# Patient Record
Sex: MALE | Race: WHITE | NOT HISPANIC OR LATINO | Employment: OTHER | URBAN - NONMETROPOLITAN AREA
[De-identification: names, ages, dates, MRNs, and addresses within clinical notes are randomized per-mention and may not be internally consistent; named-entity substitution may affect disease eponyms.]

---

## 2017-04-25 ENCOUNTER — HOSPITAL ENCOUNTER (EMERGENCY)
Facility: HOSPITAL | Age: 82
Discharge: HOME | End: 2017-04-25
Attending: EMERGENCY MEDICINE | Admitting: EMERGENCY MEDICINE

## 2017-04-25 ENCOUNTER — APPOINTMENT (OUTPATIENT)
Dept: GENERAL RADIOLOGY | Facility: HOSPITAL | Age: 82
End: 2017-04-25

## 2017-04-25 VITALS
OXYGEN SATURATION: 97 % | BODY MASS INDEX: 35.28 KG/M2 | HEART RATE: 62 BPM | RESPIRATION RATE: 18 BRPM | DIASTOLIC BLOOD PRESSURE: 79 MMHG | HEIGHT: 71 IN | WEIGHT: 252 LBS | TEMPERATURE: 98 F | SYSTOLIC BLOOD PRESSURE: 170 MMHG

## 2017-04-25 DIAGNOSIS — K92.2 UPPER GI BLEEDING: Primary | ICD-10-CM

## 2017-04-25 DIAGNOSIS — J20.9 ACUTE BRONCHITIS, UNSPECIFIED ORGANISM: ICD-10-CM

## 2017-04-25 LAB
ALBUMIN SERPL-MCNC: 2.5 G/DL (ref 3.4–4.8)
ALBUMIN/GLOB SERPL: 0.7 G/DL (ref 1.5–2.5)
ALP SERPL-CCNC: 81 U/L (ref 40–129)
ALT SERPL W P-5'-P-CCNC: 10 U/L (ref 10–44)
ANION GAP SERPL CALCULATED.3IONS-SCNC: 6.6 MMOL/L (ref 3.6–11.2)
AST SERPL-CCNC: 19 U/L (ref 10–34)
BASOPHILS # BLD AUTO: 0.03 10*3/MM3 (ref 0–0.3)
BASOPHILS NFR BLD AUTO: 0.5 % (ref 0–2)
BILIRUB SERPL-MCNC: 0.7 MG/DL (ref 0.2–1.8)
BNP SERPL-MCNC: 565 PG/ML (ref 0–100)
BUN BLD-MCNC: 38 MG/DL (ref 7–21)
BUN/CREAT SERPL: 40.9 (ref 7–25)
CALCIUM SPEC-SCNC: 8.5 MG/DL (ref 7.7–10)
CHLORIDE SERPL-SCNC: 111 MMOL/L (ref 99–112)
CO2 SERPL-SCNC: 29.4 MMOL/L (ref 24.3–31.9)
CREAT BLD-MCNC: 0.93 MG/DL (ref 0.43–1.29)
DEPRECATED RDW RBC AUTO: 45.8 FL (ref 37–54)
DEVELOPER EXPIRATION DATE: ABNORMAL
DEVELOPER LOT NUMBER: ABNORMAL
EOSINOPHIL # BLD AUTO: 0.03 10*3/MM3 (ref 0–0.7)
EOSINOPHIL NFR BLD AUTO: 0.5 % (ref 0–7)
ERYTHROCYTE [DISTWIDTH] IN BLOOD BY AUTOMATED COUNT: 13.8 % (ref 11.5–14.5)
EXPIRATION DATE: ABNORMAL
FECAL OCCULT BLOOD SCREEN, POC: POSITIVE
GFR SERPL CREATININE-BSD FRML MDRD: 78 ML/MIN/1.73
GLOBULIN UR ELPH-MCNC: 3.7 GM/DL
GLUCOSE BLD-MCNC: 93 MG/DL (ref 70–110)
HCT VFR BLD AUTO: 37.3 % (ref 42–52)
HGB BLD-MCNC: 12.1 G/DL (ref 14–18)
IMM GRANULOCYTES # BLD: 0.01 10*3/MM3 (ref 0–0.03)
IMM GRANULOCYTES NFR BLD: 0.2 % (ref 0–0.5)
INR PPP: 1.93 (ref 0.8–1.1)
LYMPHOCYTES # BLD AUTO: 2.1 10*3/MM3 (ref 1–3)
LYMPHOCYTES NFR BLD AUTO: 34.9 % (ref 16–46)
Lab: ABNORMAL
MCH RBC QN AUTO: 30.7 PG (ref 27–33)
MCHC RBC AUTO-ENTMCNC: 32.4 G/DL (ref 33–37)
MCV RBC AUTO: 94.7 FL (ref 80–94)
MONOCYTES # BLD AUTO: 0.58 10*3/MM3 (ref 0.1–0.9)
MONOCYTES NFR BLD AUTO: 9.7 % (ref 0–12)
NEGATIVE CONTROL: NEGATIVE
NEUTROPHILS # BLD AUTO: 3.26 10*3/MM3 (ref 1.4–6.5)
NEUTROPHILS NFR BLD AUTO: 54.2 % (ref 40–75)
OSMOLALITY SERPL CALC.SUM OF ELEC: 301.2 MOSM/KG (ref 273–305)
PLATELET # BLD AUTO: 148 10*3/MM3 (ref 130–400)
PMV BLD AUTO: 10 FL (ref 6–10)
POSITIVE CONTROL: POSITIVE
POTASSIUM BLD-SCNC: 3.7 MMOL/L (ref 3.5–5.3)
PROT SERPL-MCNC: 6.2 G/DL (ref 6–8)
PROTHROMBIN TIME: 22.3 SECONDS (ref 9.8–11.9)
RBC # BLD AUTO: 3.94 10*6/MM3 (ref 4.7–6.1)
SODIUM BLD-SCNC: 147 MMOL/L (ref 135–153)
TROPONIN I SERPL-MCNC: 0.03 NG/ML
WBC NRBC COR # BLD: 6.01 10*3/MM3 (ref 4.5–12.5)

## 2017-04-25 PROCEDURE — 93010 ELECTROCARDIOGRAM REPORT: CPT | Performed by: INTERNAL MEDICINE

## 2017-04-25 PROCEDURE — 85610 PROTHROMBIN TIME: CPT | Performed by: PHYSICIAN ASSISTANT

## 2017-04-25 PROCEDURE — 71020 XR CHEST 2 VW: CPT | Performed by: RADIOLOGY

## 2017-04-25 PROCEDURE — 96365 THER/PROPH/DIAG IV INF INIT: CPT

## 2017-04-25 PROCEDURE — 80053 COMPREHEN METABOLIC PANEL: CPT | Performed by: PHYSICIAN ASSISTANT

## 2017-04-25 PROCEDURE — 84484 ASSAY OF TROPONIN QUANT: CPT | Performed by: PHYSICIAN ASSISTANT

## 2017-04-25 PROCEDURE — 71020 HC CHEST PA AND LATERAL: CPT

## 2017-04-25 PROCEDURE — 83880 ASSAY OF NATRIURETIC PEPTIDE: CPT | Performed by: PHYSICIAN ASSISTANT

## 2017-04-25 PROCEDURE — 96376 TX/PRO/DX INJ SAME DRUG ADON: CPT

## 2017-04-25 PROCEDURE — 96366 THER/PROPH/DIAG IV INF ADDON: CPT

## 2017-04-25 PROCEDURE — 99283 EMERGENCY DEPT VISIT LOW MDM: CPT

## 2017-04-25 PROCEDURE — 93005 ELECTROCARDIOGRAM TRACING: CPT | Performed by: PHYSICIAN ASSISTANT

## 2017-04-25 PROCEDURE — 85025 COMPLETE CBC W/AUTO DIFF WBC: CPT | Performed by: PHYSICIAN ASSISTANT

## 2017-04-25 RX ORDER — CARVEDILOL 12.5 MG/1
12.5 TABLET ORAL 2 TIMES DAILY WITH MEALS
Status: ON HOLD | COMMUNITY
End: 2018-07-31

## 2017-04-25 RX ORDER — BENAZEPRIL HYDROCHLORIDE 20 MG/1
40 TABLET ORAL DAILY
Status: ON HOLD | COMMUNITY
End: 2018-04-24

## 2017-04-25 RX ORDER — WARFARIN SODIUM 3 MG/1
3 TABLET ORAL
Status: ON HOLD | COMMUNITY
End: 2018-04-24

## 2017-04-25 RX ORDER — OMEPRAZOLE 20 MG/1
20 CAPSULE, DELAYED RELEASE ORAL DAILY
Qty: 30 CAPSULE | Refills: 0 | Status: ON HOLD | OUTPATIENT
Start: 2017-04-25 | End: 2018-04-24

## 2017-04-25 RX ORDER — PANTOPRAZOLE SODIUM 40 MG/10ML
80 INJECTION, POWDER, LYOPHILIZED, FOR SOLUTION INTRAVENOUS ONCE
Status: COMPLETED | OUTPATIENT
Start: 2017-04-25 | End: 2017-04-25

## 2017-04-25 RX ORDER — LEVOTHYROXINE SODIUM 137 UG/1
137 TABLET ORAL DAILY
Status: ON HOLD | COMMUNITY
End: 2018-04-24

## 2017-04-25 RX ORDER — AMOXICILLIN AND CLAVULANATE POTASSIUM 875; 125 MG/1; MG/1
1 TABLET, FILM COATED ORAL EVERY 12 HOURS
Qty: 20 TABLET | Refills: 0 | Status: ON HOLD | OUTPATIENT
Start: 2017-04-25 | End: 2018-04-24

## 2017-04-25 RX ORDER — SODIUM CHLORIDE 0.9 % (FLUSH) 0.9 %
10 SYRINGE (ML) INJECTION AS NEEDED
Status: DISCONTINUED | OUTPATIENT
Start: 2017-04-25 | End: 2017-04-25 | Stop reason: HOSPADM

## 2017-04-25 RX ORDER — POTASSIUM CHLORIDE 750 MG/1
10 TABLET, EXTENDED RELEASE ORAL
COMMUNITY

## 2017-04-25 RX ORDER — FUROSEMIDE 40 MG/1
40 TABLET ORAL DAILY
COMMUNITY

## 2017-04-25 RX ORDER — BUDESONIDE AND FORMOTEROL FUMARATE DIHYDRATE 160; 4.5 UG/1; UG/1
2 AEROSOL RESPIRATORY (INHALATION)
COMMUNITY

## 2017-04-25 RX ORDER — MECLIZINE HCL 12.5 MG/1
25 TABLET ORAL 2 TIMES DAILY
Status: ON HOLD | COMMUNITY
End: 2018-04-24

## 2017-04-25 RX ORDER — PROBENECID 500 MG/1
500 TABLET, FILM COATED ORAL DAILY
COMMUNITY

## 2017-04-25 RX ADMIN — PANTOPRAZOLE SODIUM 8 MG/HR: 40 INJECTION, POWDER, FOR SOLUTION INTRAVENOUS at 13:38

## 2017-04-25 RX ADMIN — PANTOPRAZOLE SODIUM 80 MG: 40 INJECTION, POWDER, FOR SOLUTION INTRAVENOUS at 13:32

## 2017-04-25 NOTE — DISCHARGE INSTRUCTIONS

## 2017-04-25 NOTE — ED PROVIDER NOTES
Subjective   Patient is a 82 y.o. male presenting with GI bleeding.   GI Bleeding   Location:  Lower  Quality:  Black stool  Severity:  Moderate  Onset quality:  Gradual  Duration:  3 days  Timing:  Intermittent  Progression:  Unchanged  Chronicity:  New  Context:  Patient is on Coumadin.  He denies any abdominal pain.  He's not been vomiting up blood.  Associated symptoms: no abdominal pain, no chest pain, no cough, no diarrhea, no fever and no vomiting        Review of Systems   Constitutional: Negative.  Negative for fever.   HENT: Negative.    Respiratory: Negative.  Negative for cough.    Cardiovascular: Negative.  Negative for chest pain.   Gastrointestinal: Negative.  Negative for abdominal pain, diarrhea and vomiting.   Endocrine: Negative.    Genitourinary: Negative.  Negative for dysuria.   Skin: Negative.    Neurological: Negative.    Psychiatric/Behavioral: Negative.    All other systems reviewed and are negative.      Past Medical History:   Diagnosis Date   • A-fib    • Disease of thyroid gland        No Known Allergies    Past Surgical History:   Procedure Laterality Date   • CARDIAC DEFIBRILLATOR PLACEMENT     • CATARACT EXTRACTION     • PACEMAKER REPLACEMENT     • PROSTATE SURGERY         History reviewed. No pertinent family history.    Social History     Social History   • Marital status: Single     Spouse name: N/A   • Number of children: N/A   • Years of education: N/A     Social History Main Topics   • Smoking status: Never Smoker   • Smokeless tobacco: None   • Alcohol use No   • Drug use: No   • Sexual activity: No     Other Topics Concern   • None     Social History Narrative   • None           Objective   Physical Exam   Constitutional: He is oriented to person, place, and time. He appears well-developed and well-nourished. No distress.   HENT:   Head: Normocephalic and atraumatic.   Right Ear: External ear normal.   Left Ear: External ear normal.   Nose: Nose normal.   Eyes: Conjunctivae  and EOM are normal. Pupils are equal, round, and reactive to light.   Neck: Normal range of motion. Neck supple. No JVD present. No tracheal deviation present.   Cardiovascular: Normal rate, regular rhythm and normal heart sounds.    No murmur heard.  Pulmonary/Chest: Effort normal and breath sounds normal. No respiratory distress. He has no wheezes.   Abdominal: Soft. Bowel sounds are normal. There is no tenderness.   Musculoskeletal: Normal range of motion. He exhibits no edema or deformity.   Neurological: He is alert and oriented to person, place, and time. No cranial nerve deficit.   Skin: Skin is warm and dry. No rash noted. He is not diaphoretic. No erythema. No pallor.   Psychiatric: He has a normal mood and affect. His behavior is normal. Thought content normal.   Nursing note and vitals reviewed.      Procedures         ED Course  ED Course   Comment By Time   Patient is aware of the need for follow-up.  Discussed with Dr. Brownlee.  Patient is hemodynamically stable with normal hemoglobin.  He states that he will follow-up with his primary care in the next 2 days.  We will give him the number for Dr. Gimenez to follow-up with for colonoscopy.  He states his been approximately 10 years since he had its last colonoscopy.  He is aware of signs and symptoms of worsening including bright red blood per rectum abdominal pain or fever.  He is also to follow-up immediately with any vomiting up of blood. TRACY Jc 04/25 1422                  [facility]  Number of Diagnoses or Management Options  Acute bronchitis, unspecified organism: new and requires workup  Upper GI bleeding: new and requires workup     Amount and/or Complexity of Data Reviewed  Clinical lab tests: ordered and reviewed  Tests in the radiology section of CPT®: ordered and reviewed  Tests in the medicine section of CPT®: ordered and reviewed  Discuss the patient with other providers: yes    Risk of Complications, Morbidity, and/or  Mortality  Presenting problems: moderate        Final diagnoses:   Upper GI bleeding   Acute bronchitis, unspecified organism            TRACY Jc  04/25/17 1642       TRACY Jc  05/15/17 144

## 2018-01-03 ENCOUNTER — HOSPITAL ENCOUNTER (EMERGENCY)
Facility: HOSPITAL | Age: 83
Discharge: HOME | End: 2018-01-03
Attending: EMERGENCY MEDICINE | Admitting: EMERGENCY MEDICINE

## 2018-01-03 ENCOUNTER — APPOINTMENT (OUTPATIENT)
Dept: GENERAL RADIOLOGY | Facility: HOSPITAL | Age: 83
End: 2018-01-03

## 2018-01-03 VITALS
RESPIRATION RATE: 20 BRPM | DIASTOLIC BLOOD PRESSURE: 96 MMHG | HEIGHT: 70 IN | HEART RATE: 73 BPM | SYSTOLIC BLOOD PRESSURE: 171 MMHG | OXYGEN SATURATION: 98 % | TEMPERATURE: 97.7 F | BODY MASS INDEX: 34.65 KG/M2 | WEIGHT: 242 LBS

## 2018-01-03 DIAGNOSIS — J18.9 PNEUMONIA OF BOTH LUNGS DUE TO INFECTIOUS ORGANISM, UNSPECIFIED PART OF LUNG: Primary | ICD-10-CM

## 2018-01-03 LAB
A-A DO2: 21.1 MMHG (ref 0–300)
ALBUMIN SERPL-MCNC: 3.3 G/DL (ref 3.4–4.8)
ALBUMIN/GLOB SERPL: 0.8 G/DL (ref 1.5–2.5)
ALP SERPL-CCNC: 86 U/L (ref 40–129)
ALT SERPL W P-5'-P-CCNC: 17 U/L (ref 10–44)
ANION GAP SERPL CALCULATED.3IONS-SCNC: 9 MMOL/L (ref 3.6–11.2)
APTT PPP: 29.8 SECONDS (ref 23.8–36.1)
ARTERIAL PATENCY WRIST A: ABNORMAL
AST SERPL-CCNC: 27 U/L (ref 10–34)
ATMOSPHERIC PRESS: 729 MMHG
BASE EXCESS BLDA CALC-SCNC: 4.9 MMOL/L
BASOPHILS # BLD AUTO: 0.01 10*3/MM3 (ref 0–0.3)
BASOPHILS NFR BLD AUTO: 0.2 % (ref 0–2)
BDY SITE: ABNORMAL
BILIRUB SERPL-MCNC: 0.7 MG/DL (ref 0.2–1.8)
BNP SERPL-MCNC: 609 PG/ML (ref 0–100)
BODY TEMPERATURE: 98.6 C
BUN BLD-MCNC: 12 MG/DL (ref 7–21)
BUN/CREAT SERPL: 10.3 (ref 7–25)
CALCIUM SPEC-SCNC: 9 MG/DL (ref 7.7–10)
CHLORIDE SERPL-SCNC: 104 MMOL/L (ref 99–112)
CO2 SERPL-SCNC: 31 MMOL/L (ref 24.3–31.9)
COHGB MFR BLD: 1.4 % (ref 0–5)
CREAT BLD-MCNC: 1.17 MG/DL (ref 0.43–1.29)
D-LACTATE SERPL-SCNC: 1.5 MMOL/L (ref 0.5–2)
D-LACTATE SERPL-SCNC: 2.2 MMOL/L (ref 0.5–2)
DEPRECATED RDW RBC AUTO: 49.1 FL (ref 37–54)
EOSINOPHIL # BLD AUTO: 0.04 10*3/MM3 (ref 0–0.7)
EOSINOPHIL NFR BLD AUTO: 0.8 % (ref 0–7)
ERYTHROCYTE [DISTWIDTH] IN BLOOD BY AUTOMATED COUNT: 15.3 % (ref 11.5–14.5)
FLUAV AG NPH QL: NEGATIVE
FLUBV AG NPH QL IA: NEGATIVE
GFR SERPL CREATININE-BSD FRML MDRD: 60 ML/MIN/1.73
GLOBULIN UR ELPH-MCNC: 4.3 GM/DL
GLUCOSE BLD-MCNC: 181 MG/DL (ref 70–110)
HCO3 BLDA-SCNC: 29 MMOL/L (ref 22–26)
HCT VFR BLD AUTO: 36 % (ref 42–52)
HCT VFR BLD CALC: 34 % (ref 42–52)
HGB BLD-MCNC: 10.9 G/DL (ref 14–18)
HGB BLDA-MCNC: 11.6 G/DL (ref 12–16)
HOLD SPECIMEN: NORMAL
HOROWITZ INDEX BLD+IHG-RTO: 21 %
IMM GRANULOCYTES # BLD: 0 10*3/MM3 (ref 0–0.03)
IMM GRANULOCYTES NFR BLD: 0 % (ref 0–0.5)
INR PPP: 1.28 (ref 0.9–1.1)
LYMPHOCYTES # BLD AUTO: 1.24 10*3/MM3 (ref 1–3)
LYMPHOCYTES NFR BLD AUTO: 25.3 % (ref 16–46)
MCH RBC QN AUTO: 26.9 PG (ref 27–33)
MCHC RBC AUTO-ENTMCNC: 30.3 G/DL (ref 33–37)
MCV RBC AUTO: 88.9 FL (ref 80–94)
METHGB BLD QL: 0.3 % (ref 0–3)
MODALITY: ABNORMAL
MONOCYTES # BLD AUTO: 0.37 10*3/MM3 (ref 0.1–0.9)
MONOCYTES NFR BLD AUTO: 7.6 % (ref 0–12)
NEUTROPHILS # BLD AUTO: 3.24 10*3/MM3 (ref 1.4–6.5)
NEUTROPHILS NFR BLD AUTO: 66.1 % (ref 40–75)
OSMOLALITY SERPL CALC.SUM OF ELEC: 291.2 MOSM/KG (ref 273–305)
OXYHGB MFR BLDV: 93.1 % (ref 85–100)
PCO2 BLDA: 40.9 MM HG (ref 35–45)
PH BLDA: 7.47 PH UNITS (ref 7.35–7.45)
PLATELET # BLD AUTO: 138 10*3/MM3 (ref 130–400)
PMV BLD AUTO: 10.9 FL (ref 6–10)
PO2 BLDA: 73.1 MM HG (ref 80–100)
POTASSIUM BLD-SCNC: 3.8 MMOL/L (ref 3.5–5.3)
PROT SERPL-MCNC: 7.6 G/DL (ref 6–8)
PROTHROMBIN TIME: 16.1 SECONDS (ref 11–15.4)
RBC # BLD AUTO: 4.05 10*6/MM3 (ref 4.7–6.1)
SAO2 % BLDCOA: 94.7 % (ref 90–100)
SODIUM BLD-SCNC: 144 MMOL/L (ref 135–153)
TROPONIN I SERPL-MCNC: 0.05 NG/ML
TROPONIN I SERPL-MCNC: 0.06 NG/ML
WBC NRBC COR # BLD: 4.9 10*3/MM3 (ref 4.5–12.5)

## 2018-01-03 PROCEDURE — 25010000002 HYDRALAZINE PER 20 MG: Performed by: PHYSICIAN ASSISTANT

## 2018-01-03 PROCEDURE — 71046 X-RAY EXAM CHEST 2 VIEWS: CPT

## 2018-01-03 PROCEDURE — 87804 INFLUENZA ASSAY W/OPTIC: CPT | Performed by: PHYSICIAN ASSISTANT

## 2018-01-03 PROCEDURE — 96375 TX/PRO/DX INJ NEW DRUG ADDON: CPT

## 2018-01-03 PROCEDURE — 71046 X-RAY EXAM CHEST 2 VIEWS: CPT | Performed by: RADIOLOGY

## 2018-01-03 PROCEDURE — 83605 ASSAY OF LACTIC ACID: CPT | Performed by: PHYSICIAN ASSISTANT

## 2018-01-03 PROCEDURE — 25010000002 CEFTRIAXONE PER 250 MG: Performed by: PHYSICIAN ASSISTANT

## 2018-01-03 PROCEDURE — 96365 THER/PROPH/DIAG IV INF INIT: CPT

## 2018-01-03 PROCEDURE — 85730 THROMBOPLASTIN TIME PARTIAL: CPT | Performed by: PHYSICIAN ASSISTANT

## 2018-01-03 PROCEDURE — 83050 HGB METHEMOGLOBIN QUAN: CPT | Performed by: PHYSICIAN ASSISTANT

## 2018-01-03 PROCEDURE — 87040 BLOOD CULTURE FOR BACTERIA: CPT | Performed by: PHYSICIAN ASSISTANT

## 2018-01-03 PROCEDURE — 25010000002 METHYLPREDNISOLONE PER 125 MG: Performed by: PHYSICIAN ASSISTANT

## 2018-01-03 PROCEDURE — 80053 COMPREHEN METABOLIC PANEL: CPT | Performed by: PHYSICIAN ASSISTANT

## 2018-01-03 PROCEDURE — 94799 UNLISTED PULMONARY SVC/PX: CPT

## 2018-01-03 PROCEDURE — 93005 ELECTROCARDIOGRAM TRACING: CPT | Performed by: EMERGENCY MEDICINE

## 2018-01-03 PROCEDURE — 36415 COLL VENOUS BLD VENIPUNCTURE: CPT

## 2018-01-03 PROCEDURE — 36600 WITHDRAWAL OF ARTERIAL BLOOD: CPT | Performed by: PHYSICIAN ASSISTANT

## 2018-01-03 PROCEDURE — 82805 BLOOD GASES W/O2 SATURATION: CPT | Performed by: PHYSICIAN ASSISTANT

## 2018-01-03 PROCEDURE — 85025 COMPLETE CBC W/AUTO DIFF WBC: CPT | Performed by: PHYSICIAN ASSISTANT

## 2018-01-03 PROCEDURE — 99284 EMERGENCY DEPT VISIT MOD MDM: CPT

## 2018-01-03 PROCEDURE — 83880 ASSAY OF NATRIURETIC PEPTIDE: CPT | Performed by: PHYSICIAN ASSISTANT

## 2018-01-03 PROCEDURE — 84484 ASSAY OF TROPONIN QUANT: CPT | Performed by: PHYSICIAN ASSISTANT

## 2018-01-03 PROCEDURE — 94640 AIRWAY INHALATION TREATMENT: CPT

## 2018-01-03 PROCEDURE — 85610 PROTHROMBIN TIME: CPT | Performed by: PHYSICIAN ASSISTANT

## 2018-01-03 PROCEDURE — 82375 ASSAY CARBOXYHB QUANT: CPT | Performed by: PHYSICIAN ASSISTANT

## 2018-01-03 RX ORDER — IPRATROPIUM BROMIDE AND ALBUTEROL SULFATE 2.5; .5 MG/3ML; MG/3ML
3 SOLUTION RESPIRATORY (INHALATION) ONCE
Status: COMPLETED | OUTPATIENT
Start: 2018-01-03 | End: 2018-01-03

## 2018-01-03 RX ORDER — HYDRALAZINE HYDROCHLORIDE 20 MG/ML
10 INJECTION INTRAMUSCULAR; INTRAVENOUS ONCE
Status: COMPLETED | OUTPATIENT
Start: 2018-01-03 | End: 2018-01-03

## 2018-01-03 RX ORDER — SODIUM CHLORIDE 0.9 % (FLUSH) 0.9 %
10 SYRINGE (ML) INJECTION AS NEEDED
Status: DISCONTINUED | OUTPATIENT
Start: 2018-01-03 | End: 2018-01-03 | Stop reason: HOSPADM

## 2018-01-03 RX ORDER — CEFDINIR 300 MG/1
300 CAPSULE ORAL
Qty: 20 CAPSULE | Refills: 0 | Status: SHIPPED | OUTPATIENT
Start: 2018-01-03 | End: 2018-01-13

## 2018-01-03 RX ORDER — METHYLPREDNISOLONE SODIUM SUCCINATE 125 MG/2ML
125 INJECTION, POWDER, LYOPHILIZED, FOR SOLUTION INTRAMUSCULAR; INTRAVENOUS ONCE
Status: COMPLETED | OUTPATIENT
Start: 2018-01-03 | End: 2018-01-03

## 2018-01-03 RX ORDER — PREDNISONE 20 MG/1
20 TABLET ORAL 3 TIMES DAILY
Qty: 15 TABLET | Refills: 0 | Status: SHIPPED | OUTPATIENT
Start: 2018-01-03 | End: 2018-01-08

## 2018-01-03 RX ORDER — DOXYCYCLINE 100 MG/1
100 CAPSULE ORAL EVERY 12 HOURS SCHEDULED
Qty: 20 CAPSULE | Refills: 0 | Status: SHIPPED | OUTPATIENT
Start: 2018-01-03 | End: 2018-01-13

## 2018-01-03 RX ORDER — ALBUTEROL SULFATE 90 UG/1
2 AEROSOL, METERED RESPIRATORY (INHALATION) EVERY 4 HOURS PRN
Qty: 1 INHALER | Refills: 0 | Status: ON HOLD | OUTPATIENT
Start: 2018-01-03 | End: 2018-04-24

## 2018-01-03 RX ADMIN — IPRATROPIUM BROMIDE AND ALBUTEROL SULFATE 3 ML: .5; 3 SOLUTION RESPIRATORY (INHALATION) at 13:27

## 2018-01-03 RX ADMIN — METHYLPREDNISOLONE SODIUM SUCCINATE 125 MG: 125 INJECTION, POWDER, FOR SOLUTION INTRAMUSCULAR; INTRAVENOUS at 11:15

## 2018-01-03 RX ADMIN — IPRATROPIUM BROMIDE AND ALBUTEROL SULFATE 3 ML: .5; 3 SOLUTION RESPIRATORY (INHALATION) at 12:00

## 2018-01-03 RX ADMIN — IPRATROPIUM BROMIDE AND ALBUTEROL SULFATE 3 ML: .5; 3 SOLUTION RESPIRATORY (INHALATION) at 11:21

## 2018-01-03 RX ADMIN — HYDRALAZINE HYDROCHLORIDE 10 MG: 20 INJECTION INTRAMUSCULAR; INTRAVENOUS at 14:41

## 2018-01-03 RX ADMIN — CEFTRIAXONE 1 G: 1 INJECTION, SOLUTION INTRAVENOUS at 14:27

## 2018-01-03 NOTE — ED PROVIDER NOTES
Subjective   HPI Comments: Pt was seen at urgent care yesterday and they told pt he most likely had pneumonia, referred him on to ED for further evaluation. States that he did not want to come in yesterday, so came this shannan instead. He states he has had cough with sputum production that started x2 weeks ago, but was mild in the beginning, and has become worse in the last few days. He denies any fever. He denies CP.     Patient is a 83 y.o. male presenting with shortness of breath.   History provided by:  Patient   used: No    Shortness of Breath   Severity:  Moderate  Onset quality:  Sudden  Duration:  2 weeks  Timing:  Constant  Progression:  Unchanged  Chronicity:  New  Context: URI and weather changes    Relieved by:  Rest  Worsened by:  Exertion, coughing and activity  Ineffective treatments:  None tried  Associated symptoms: cough, sputum production and wheezing    Associated symptoms: no chest pain, no ear pain, no fever, no headaches, no rash, no sore throat and no vomiting    Risk factors: no hx of PE/DVT and no tobacco use        Review of Systems   Constitutional: Negative for activity change and fever.   HENT: Negative for congestion, ear pain and sore throat.    Eyes: Negative for pain.   Respiratory: Positive for cough, sputum production, shortness of breath and wheezing.    Cardiovascular: Negative for chest pain.   Gastrointestinal: Negative for abdominal distention, diarrhea, nausea and vomiting.   Genitourinary: Negative for difficulty urinating and dysuria.   Musculoskeletal: Negative for arthralgias and myalgias.   Skin: Negative for rash and wound.   Neurological: Negative for dizziness and headaches.   Psychiatric/Behavioral: Negative for agitation.   All other systems reviewed and are negative.      Past Medical History:   Diagnosis Date   • A-fib    • Bleeding ulcer    • CHF (congestive heart failure)    • COPD (chronic obstructive pulmonary disease)    • Disease of  thyroid gland    • Hypertension        No Known Allergies    Past Surgical History:   Procedure Laterality Date   • CARDIAC DEFIBRILLATOR PLACEMENT     • CATARACT EXTRACTION     • PACEMAKER REPLACEMENT     • PROSTATE SURGERY         History reviewed. No pertinent family history.    Social History     Social History   • Marital status: Single     Spouse name: N/A   • Number of children: N/A   • Years of education: N/A     Social History Main Topics   • Smoking status: Never Smoker   • Smokeless tobacco: Never Used   • Alcohol use No   • Drug use: No   • Sexual activity: No     Other Topics Concern   • None     Social History Narrative   • None           Objective   Physical Exam   Constitutional: He is oriented to person, place, and time. He appears well-developed and well-nourished.   HENT:   Head: Normocephalic and atraumatic.   Eyes: EOM are normal. Pupils are equal, round, and reactive to light.   Neck: Normal range of motion. Neck supple.   Cardiovascular: Normal rate, regular rhythm and normal heart sounds.    Pulmonary/Chest: Effort normal. He has decreased breath sounds. He has wheezes.   Abdominal: Soft. Bowel sounds are normal.   Musculoskeletal: Normal range of motion.   Neurological: He is alert and oriented to person, place, and time.   Skin: Skin is warm and dry.   Psychiatric: He has a normal mood and affect. His behavior is normal. Judgment and thought content normal.   Nursing note and vitals reviewed.      Procedures         ED Course  ED Course   Value Comment By Time   ECG 12 Lead Ventricular paced rhythm, rate 62. -per TRACY Alexandra 01/03 1109    Pt states he did not take his morning meds, but he did bring his meds with him and would like to take them at this time. TRACY Rosales 01/03 1159   Lactate, Venous: (!!) 2.2 Will hold off on iv fluids at this time as pt's bnp is elevated. TRACY Rosales 01/03 1306                  MDM  Number of Diagnoses or Management  Options  Pneumonia of both lungs due to infectious organism, unspecified part of lung:      Amount and/or Complexity of Data Reviewed  Clinical lab tests: ordered and reviewed  Tests in the radiology section of CPT®: ordered and reviewed  Tests in the medicine section of CPT®: ordered and reviewed  Decide to obtain previous medical records or to obtain history from someone other than the patient: yes  Independent visualization of images, tracings, or specimens: yes    Patient Progress  Patient progress: stable      Final diagnoses:   Pneumonia of both lungs due to infectious organism, unspecified part of lung            TRACY Rosales  01/03/18 9564

## 2018-01-08 LAB
BACTERIA SPEC AEROBE CULT: NORMAL
BACTERIA SPEC AEROBE CULT: NORMAL

## 2018-03-25 ENCOUNTER — APPOINTMENT (OUTPATIENT)
Dept: GENERAL RADIOLOGY | Facility: HOSPITAL | Age: 83
End: 2018-03-25

## 2018-03-25 ENCOUNTER — HOSPITAL ENCOUNTER (EMERGENCY)
Facility: HOSPITAL | Age: 83
Discharge: HOME | End: 2018-03-25
Attending: EMERGENCY MEDICINE | Admitting: NURSE PRACTITIONER

## 2018-03-25 VITALS
BODY MASS INDEX: 33.21 KG/M2 | TEMPERATURE: 98 F | OXYGEN SATURATION: 98 % | DIASTOLIC BLOOD PRESSURE: 78 MMHG | HEART RATE: 71 BPM | WEIGHT: 232 LBS | RESPIRATION RATE: 16 BRPM | SYSTOLIC BLOOD PRESSURE: 160 MMHG | HEIGHT: 70 IN

## 2018-03-25 DIAGNOSIS — J18.9 PNEUMONIA OF RIGHT LOWER LOBE DUE TO INFECTIOUS ORGANISM: Primary | ICD-10-CM

## 2018-03-25 LAB
ALBUMIN SERPL-MCNC: 3.3 G/DL (ref 3.4–4.8)
ALBUMIN/GLOB SERPL: 0.9 G/DL (ref 1.5–2.5)
ALP SERPL-CCNC: 83 U/L (ref 40–129)
ALT SERPL W P-5'-P-CCNC: 13 U/L (ref 10–44)
ANION GAP SERPL CALCULATED.3IONS-SCNC: 5.1 MMOL/L (ref 3.6–11.2)
AST SERPL-CCNC: 19 U/L (ref 10–34)
BASOPHILS # BLD AUTO: 0.02 10*3/MM3 (ref 0–0.3)
BASOPHILS NFR BLD AUTO: 0.5 % (ref 0–2)
BILIRUB SERPL-MCNC: 0.3 MG/DL (ref 0.2–1.8)
BNP SERPL-MCNC: 513 PG/ML (ref 0–100)
BUN BLD-MCNC: 14 MG/DL (ref 7–21)
BUN/CREAT SERPL: 10.4 (ref 7–25)
CALCIUM SPEC-SCNC: 8.9 MG/DL (ref 7.7–10)
CHLORIDE SERPL-SCNC: 106 MMOL/L (ref 99–112)
CO2 SERPL-SCNC: 31.9 MMOL/L (ref 24.3–31.9)
CREAT BLD-MCNC: 1.35 MG/DL (ref 0.43–1.29)
DEPRECATED RDW RBC AUTO: 48.4 FL (ref 37–54)
EOSINOPHIL # BLD AUTO: 0.05 10*3/MM3 (ref 0–0.7)
EOSINOPHIL NFR BLD AUTO: 1.3 % (ref 0–7)
ERYTHROCYTE [DISTWIDTH] IN BLOOD BY AUTOMATED COUNT: 15.1 % (ref 11.5–14.5)
GFR SERPL CREATININE-BSD FRML MDRD: 50 ML/MIN/1.73
GLOBULIN UR ELPH-MCNC: 3.7 GM/DL
GLUCOSE BLD-MCNC: 128 MG/DL (ref 70–110)
HCT VFR BLD AUTO: 33.3 % (ref 42–52)
HGB BLD-MCNC: 10.2 G/DL (ref 14–18)
IMM GRANULOCYTES # BLD: 0.01 10*3/MM3 (ref 0–0.03)
IMM GRANULOCYTES NFR BLD: 0.3 % (ref 0–0.5)
LYMPHOCYTES # BLD AUTO: 1.42 10*3/MM3 (ref 1–3)
LYMPHOCYTES NFR BLD AUTO: 37.8 % (ref 16–46)
MCH RBC QN AUTO: 27.1 PG (ref 27–33)
MCHC RBC AUTO-ENTMCNC: 30.6 G/DL (ref 33–37)
MCV RBC AUTO: 88.6 FL (ref 80–94)
MONOCYTES # BLD AUTO: 0.42 10*3/MM3 (ref 0.1–0.9)
MONOCYTES NFR BLD AUTO: 11.2 % (ref 0–12)
NEUTROPHILS # BLD AUTO: 1.84 10*3/MM3 (ref 1.4–6.5)
NEUTROPHILS NFR BLD AUTO: 48.9 % (ref 40–75)
OSMOLALITY SERPL CALC.SUM OF ELEC: 287.1 MOSM/KG (ref 273–305)
PLATELET # BLD AUTO: 141 10*3/MM3 (ref 130–400)
PMV BLD AUTO: 9.2 FL (ref 6–10)
POTASSIUM BLD-SCNC: 4.6 MMOL/L (ref 3.5–5.3)
PROT SERPL-MCNC: 7 G/DL (ref 6–8)
RBC # BLD AUTO: 3.76 10*6/MM3 (ref 4.7–6.1)
SODIUM BLD-SCNC: 143 MMOL/L (ref 135–153)
WBC NRBC COR # BLD: 3.76 10*3/MM3 (ref 4.5–12.5)

## 2018-03-25 PROCEDURE — 83880 ASSAY OF NATRIURETIC PEPTIDE: CPT | Performed by: NURSE PRACTITIONER

## 2018-03-25 PROCEDURE — 85025 COMPLETE CBC W/AUTO DIFF WBC: CPT | Performed by: NURSE PRACTITIONER

## 2018-03-25 PROCEDURE — 99284 EMERGENCY DEPT VISIT MOD MDM: CPT

## 2018-03-25 PROCEDURE — 71046 X-RAY EXAM CHEST 2 VIEWS: CPT | Performed by: RADIOLOGY

## 2018-03-25 PROCEDURE — 80053 COMPREHEN METABOLIC PANEL: CPT | Performed by: NURSE PRACTITIONER

## 2018-03-25 PROCEDURE — 71046 X-RAY EXAM CHEST 2 VIEWS: CPT

## 2018-03-25 RX ORDER — AMOXICILLIN AND CLAVULANATE POTASSIUM 875; 125 MG/1; MG/1
1 TABLET, FILM COATED ORAL 2 TIMES DAILY
Qty: 20 TABLET | Refills: 0 | Status: SHIPPED | OUTPATIENT
Start: 2018-03-25 | End: 2018-04-04

## 2018-03-25 RX ORDER — AMOXICILLIN AND CLAVULANATE POTASSIUM 875; 125 MG/1; MG/1
1 TABLET, FILM COATED ORAL ONCE
Status: COMPLETED | OUTPATIENT
Start: 2018-03-25 | End: 2018-03-25

## 2018-03-25 RX ORDER — AMOXICILLIN AND CLAVULANATE POTASSIUM 875; 125 MG/1; MG/1
TABLET, FILM COATED ORAL
Status: COMPLETED
Start: 2018-03-25 | End: 2018-03-25

## 2018-03-25 RX ADMIN — AMOXICILLIN AND CLAVULANATE POTASSIUM: 875; 125 TABLET, FILM COATED ORAL at 21:18

## 2018-03-26 NOTE — ED PROVIDER NOTES
Subjective     History provided by:  Patient  Cough   Cough characteristics:  Productive  Sputum characteristics:  Yellow  Severity:  Moderate  Onset quality:  Sudden  Timing:  Constant  Progression:  Worsening  Chronicity:  New  Smoker: no    Relieved by:  None tried  Worsened by:  Nothing  Ineffective treatments:  None tried  Associated symptoms: no chest pain, no fever and no shortness of breath        Review of Systems   Constitutional: Negative.  Negative for fever.   HENT: Negative.    Respiratory: Positive for cough. Negative for shortness of breath.    Cardiovascular: Negative.  Negative for chest pain.   Gastrointestinal: Negative.  Negative for abdominal pain.   Endocrine: Negative.    Genitourinary: Negative.  Negative for dysuria.   Skin: Negative.    Neurological: Negative.    Psychiatric/Behavioral: Negative.    All other systems reviewed and are negative.      Past Medical History:   Diagnosis Date   • A-fib    • Bleeding ulcer    • CHF (congestive heart failure)    • COPD (chronic obstructive pulmonary disease)    • Disease of thyroid gland    • Hypertension        No Known Allergies    Past Surgical History:   Procedure Laterality Date   • CARDIAC DEFIBRILLATOR PLACEMENT     • CATARACT EXTRACTION     • PACEMAKER REPLACEMENT     • PROSTATE SURGERY         History reviewed. No pertinent family history.    Social History     Social History   • Marital status:      Social History Main Topics   • Smoking status: Never Smoker   • Smokeless tobacco: Never Used   • Alcohol use No   • Drug use: No   • Sexual activity: No     Other Topics Concern   • Not on file           Objective   Physical Exam   Constitutional: He is oriented to person, place, and time. He appears well-developed and well-nourished. No distress.   HENT:   Head: Normocephalic and atraumatic.   Right Ear: External ear normal.   Left Ear: External ear normal.   Nose: Nose normal.   Eyes: Conjunctivae and EOM are normal. Pupils are  equal, round, and reactive to light.   Neck: Normal range of motion. Neck supple. No JVD present. No tracheal deviation present.   Cardiovascular: Normal rate, regular rhythm and normal heart sounds.    No murmur heard.  Pulmonary/Chest: Effort normal. No respiratory distress. He has wheezes.   Abdominal: Soft. Bowel sounds are normal. There is no tenderness.   Musculoskeletal: Normal range of motion. He exhibits no edema or deformity.   Neurological: He is alert and oriented to person, place, and time. No cranial nerve deficit.   Skin: Skin is warm and dry. No rash noted. He is not diaphoretic. No erythema. No pallor.   Psychiatric: He has a normal mood and affect. His behavior is normal. Thought content normal.   Nursing note and vitals reviewed.      Procedures         ED Course  ED Course                  MDM  Number of Diagnoses or Management Options  Pneumonia of right lower lobe due to infectious organism: new and requires workup     Amount and/or Complexity of Data Reviewed  Clinical lab tests: reviewed  Tests in the radiology section of CPT®: reviewed  Decide to obtain previous medical records or to obtain history from someone other than the patient: yes    Risk of Complications, Morbidity, and/or Mortality  Presenting problems: low  Diagnostic procedures: low  Management options: low    Patient Progress  Patient progress: stable      Final diagnoses:   Pneumonia of right lower lobe due to infectious organism            Liliana Adamson, APRN  03/25/18 3678

## 2018-04-24 ENCOUNTER — HOSPITAL ENCOUNTER (INPATIENT)
Facility: HOSPITAL | Age: 83
LOS: 14 days | Discharge: HOME HEALTH | End: 2018-05-08
Attending: FAMILY MEDICINE | Admitting: FAMILY MEDICINE

## 2018-04-24 DIAGNOSIS — R53.81 DEBILITY: Primary | ICD-10-CM

## 2018-04-24 PROCEDURE — 94640 AIRWAY INHALATION TREATMENT: CPT

## 2018-04-24 PROCEDURE — 94799 UNLISTED PULMONARY SVC/PX: CPT

## 2018-04-24 RX ORDER — ALBUTEROL SULFATE 2.5 MG/3ML
2.5 SOLUTION RESPIRATORY (INHALATION) EVERY 6 HOURS PRN
Status: DISCONTINUED | OUTPATIENT
Start: 2018-04-24 | End: 2018-05-08 | Stop reason: HOSPADM

## 2018-04-24 RX ORDER — NIFEDIPINE 30 MG/1
30 TABLET, FILM COATED, EXTENDED RELEASE ORAL
Status: DISCONTINUED | OUTPATIENT
Start: 2018-04-25 | End: 2018-05-08 | Stop reason: HOSPADM

## 2018-04-24 RX ORDER — LANOLIN ALCOHOL/MO/W.PET/CERES
1000 CREAM (GRAM) TOPICAL DAILY
COMMUNITY

## 2018-04-24 RX ORDER — OMEGA-3S/DHA/EPA/FISH OIL/D3 300MG-1000
1000 CAPSULE ORAL DAILY
Status: DISCONTINUED | OUTPATIENT
Start: 2018-04-25 | End: 2018-05-08 | Stop reason: HOSPADM

## 2018-04-24 RX ORDER — OXYCODONE HYDROCHLORIDE 5 MG/1
5 TABLET ORAL EVERY 6 HOURS PRN
Status: DISPENSED | OUTPATIENT
Start: 2018-04-24 | End: 2018-05-04

## 2018-04-24 RX ORDER — PANTOPRAZOLE SODIUM 40 MG/1
40 TABLET, DELAYED RELEASE ORAL 2 TIMES DAILY
COMMUNITY

## 2018-04-24 RX ORDER — FLUTICASONE PROPIONATE 50 MCG
2 SPRAY, SUSPENSION (ML) NASAL DAILY
Status: DISCONTINUED | OUTPATIENT
Start: 2018-04-25 | End: 2018-05-08 | Stop reason: HOSPADM

## 2018-04-24 RX ORDER — PROBENECID 500 MG/1
500 TABLET, FILM COATED ORAL DAILY
Status: DISCONTINUED | OUTPATIENT
Start: 2018-04-25 | End: 2018-05-08 | Stop reason: HOSPADM

## 2018-04-24 RX ORDER — LOSARTAN POTASSIUM 50 MG/1
50 TABLET ORAL DAILY
COMMUNITY
End: 2018-05-08 | Stop reason: HOSPADM

## 2018-04-24 RX ORDER — FLUTICASONE PROPIONATE 50 MCG
1 SPRAY, SUSPENSION (ML) NASAL DAILY
COMMUNITY

## 2018-04-24 RX ORDER — ONDANSETRON 4 MG/1
4 TABLET, FILM COATED ORAL EVERY 6 HOURS PRN
Status: DISCONTINUED | OUTPATIENT
Start: 2018-04-24 | End: 2018-05-08 | Stop reason: HOSPADM

## 2018-04-24 RX ORDER — CARVEDILOL 6.25 MG/1
12.5 TABLET ORAL EVERY 12 HOURS SCHEDULED
Status: DISCONTINUED | OUTPATIENT
Start: 2018-04-24 | End: 2018-05-08 | Stop reason: HOSPADM

## 2018-04-24 RX ORDER — POTASSIUM CHLORIDE 750 MG/1
10 TABLET, FILM COATED, EXTENDED RELEASE ORAL DAILY
Status: DISCONTINUED | OUTPATIENT
Start: 2018-04-25 | End: 2018-05-08 | Stop reason: HOSPADM

## 2018-04-24 RX ORDER — PANTOPRAZOLE SODIUM 40 MG/1
40 TABLET, DELAYED RELEASE ORAL
Status: DISCONTINUED | OUTPATIENT
Start: 2018-04-24 | End: 2018-05-08 | Stop reason: HOSPADM

## 2018-04-24 RX ORDER — GUAIFENESIN 600 MG/1
600 TABLET, EXTENDED RELEASE ORAL EVERY 12 HOURS SCHEDULED
Status: DISCONTINUED | OUTPATIENT
Start: 2018-04-24 | End: 2018-05-08 | Stop reason: HOSPADM

## 2018-04-24 RX ORDER — SALSALATE 500 MG
500 TABLET ORAL 2 TIMES DAILY
COMMUNITY
End: 2018-05-08 | Stop reason: HOSPADM

## 2018-04-24 RX ORDER — LEVOTHYROXINE SODIUM 0.15 MG/1
150 TABLET ORAL DAILY
COMMUNITY

## 2018-04-24 RX ORDER — ACETAMINOPHEN 325 MG/1
650 TABLET ORAL EVERY 4 HOURS PRN
Status: DISCONTINUED | OUTPATIENT
Start: 2018-04-24 | End: 2018-05-08 | Stop reason: HOSPADM

## 2018-04-24 RX ORDER — KETOTIFEN FUMARATE 0.35 MG/ML
1 SOLUTION/ DROPS OPHTHALMIC 2 TIMES DAILY
Status: DISCONTINUED | OUTPATIENT
Start: 2018-04-24 | End: 2018-05-08 | Stop reason: HOSPADM

## 2018-04-24 RX ORDER — BUDESONIDE AND FORMOTEROL FUMARATE DIHYDRATE 160; 4.5 UG/1; UG/1
2 AEROSOL RESPIRATORY (INHALATION)
Status: DISCONTINUED | OUTPATIENT
Start: 2018-04-24 | End: 2018-04-25

## 2018-04-24 RX ORDER — ONDANSETRON 2 MG/ML
4 INJECTION INTRAMUSCULAR; INTRAVENOUS EVERY 6 HOURS PRN
Status: DISCONTINUED | OUTPATIENT
Start: 2018-04-24 | End: 2018-05-08 | Stop reason: HOSPADM

## 2018-04-24 RX ORDER — ALFUZOSIN HYDROCHLORIDE 10 MG/1
10 TABLET, EXTENDED RELEASE ORAL DAILY
Status: DISCONTINUED | OUTPATIENT
Start: 2018-04-25 | End: 2018-05-08 | Stop reason: HOSPADM

## 2018-04-24 RX ORDER — ALBUTEROL SULFATE 90 UG/1
2 AEROSOL, METERED RESPIRATORY (INHALATION) EVERY 6 HOURS PRN
COMMUNITY

## 2018-04-24 RX ORDER — LANOLIN ALCOHOL/MO/W.PET/CERES
1000 CREAM (GRAM) TOPICAL DAILY
Status: DISCONTINUED | OUTPATIENT
Start: 2018-04-25 | End: 2018-05-08 | Stop reason: HOSPADM

## 2018-04-24 RX ORDER — ONDANSETRON 4 MG/1
4 TABLET, ORALLY DISINTEGRATING ORAL EVERY 6 HOURS PRN
Status: DISCONTINUED | OUTPATIENT
Start: 2018-04-24 | End: 2018-05-08 | Stop reason: HOSPADM

## 2018-04-24 RX ORDER — BISACODYL 10 MG
10 SUPPOSITORY, RECTAL RECTAL DAILY PRN
Status: DISCONTINUED | OUTPATIENT
Start: 2018-04-24 | End: 2018-05-08 | Stop reason: HOSPADM

## 2018-04-24 RX ORDER — ALFUZOSIN HYDROCHLORIDE 10 MG/1
10 TABLET, EXTENDED RELEASE ORAL DAILY
COMMUNITY

## 2018-04-24 RX ORDER — FUROSEMIDE 40 MG/1
40 TABLET ORAL DAILY
Status: DISCONTINUED | OUTPATIENT
Start: 2018-04-25 | End: 2018-05-08 | Stop reason: HOSPADM

## 2018-04-24 RX ORDER — MECLIZINE HCL 12.5 MG/1
12.5 TABLET ORAL 2 TIMES DAILY
Status: DISCONTINUED | OUTPATIENT
Start: 2018-04-24 | End: 2018-05-08 | Stop reason: HOSPADM

## 2018-04-24 RX ORDER — CARBOXYMETHYLCELLULOSE SODIUM 5 MG/ML
2 SOLUTION/ DROPS OPHTHALMIC 3 TIMES DAILY PRN
Status: DISCONTINUED | OUTPATIENT
Start: 2018-04-24 | End: 2018-05-08 | Stop reason: HOSPADM

## 2018-04-24 RX ORDER — LEVOTHYROXINE SODIUM 0.15 MG/1
150 TABLET ORAL
Status: DISCONTINUED | OUTPATIENT
Start: 2018-04-25 | End: 2018-05-08 | Stop reason: HOSPADM

## 2018-04-24 RX ADMIN — APIXABAN 10 MG: 5 TABLET, FILM COATED ORAL at 21:23

## 2018-04-24 RX ADMIN — PANTOPRAZOLE SODIUM 40 MG: 40 TABLET, DELAYED RELEASE ORAL at 22:26

## 2018-04-24 RX ADMIN — CARBOXYMETHYLCELLULOSE SODIUM 2 DROP: 5 SOLUTION/ DROPS OPHTHALMIC at 22:27

## 2018-04-24 RX ADMIN — OXYCODONE HYDROCHLORIDE 5 MG: 5 TABLET ORAL at 19:55

## 2018-04-24 RX ADMIN — GUAIFENESIN 600 MG: 600 TABLET, EXTENDED RELEASE ORAL at 21:23

## 2018-04-24 RX ADMIN — CARVEDILOL 12.5 MG: 6.25 TABLET, FILM COATED ORAL at 21:23

## 2018-04-24 RX ADMIN — KETOTIFEN FUMARATE 1 DROP: 0.35 SOLUTION/ DROPS OPHTHALMIC at 22:26

## 2018-04-24 RX ADMIN — MECLIZINE HCL 12.5 MG: 12.5 TABLET ORAL at 21:23

## 2018-04-24 RX ADMIN — ACETAMINOPHEN 650 MG: 325 TABLET ORAL at 21:24

## 2018-04-25 LAB
ALBUMIN SERPL-MCNC: 2.6 G/DL (ref 3.4–4.8)
ALBUMIN/GLOB SERPL: 0.7 G/DL (ref 1.5–2.5)
ALP SERPL-CCNC: 141 U/L (ref 40–129)
ALT SERPL W P-5'-P-CCNC: 27 U/L (ref 10–44)
ANION GAP SERPL CALCULATED.3IONS-SCNC: 8.5 MMOL/L (ref 3.6–11.2)
AST SERPL-CCNC: 39 U/L (ref 10–34)
BASOPHILS # BLD AUTO: 0.02 10*3/MM3 (ref 0–0.3)
BASOPHILS NFR BLD AUTO: 0.3 % (ref 0–2)
BILIRUB SERPL-MCNC: 0.8 MG/DL (ref 0.2–1.8)
BUN BLD-MCNC: 20 MG/DL (ref 7–21)
BUN/CREAT SERPL: 17.7 (ref 7–25)
CALCIUM SPEC-SCNC: 8.4 MG/DL (ref 7.7–10)
CHLORIDE SERPL-SCNC: 99 MMOL/L (ref 99–112)
CO2 SERPL-SCNC: 21.5 MMOL/L (ref 24.3–31.9)
CREAT BLD-MCNC: 1.13 MG/DL (ref 0.43–1.29)
DEPRECATED RDW RBC AUTO: 48.5 FL (ref 37–54)
EOSINOPHIL # BLD AUTO: 0.12 10*3/MM3 (ref 0–0.7)
EOSINOPHIL NFR BLD AUTO: 1.8 % (ref 0–7)
ERYTHROCYTE [DISTWIDTH] IN BLOOD BY AUTOMATED COUNT: 15.5 % (ref 11.5–14.5)
GFR SERPL CREATININE-BSD FRML MDRD: 62 ML/MIN/1.73
GLOBULIN UR ELPH-MCNC: 3.7 GM/DL
GLUCOSE BLD-MCNC: 107 MG/DL (ref 70–110)
GLUCOSE BLDC GLUCOMTR-MCNC: 119 MG/DL (ref 70–130)
GLUCOSE BLDC GLUCOMTR-MCNC: 126 MG/DL (ref 70–130)
HCT VFR BLD AUTO: 25.1 % (ref 42–52)
HGB BLD-MCNC: 7.8 G/DL (ref 14–18)
IMM GRANULOCYTES # BLD: 0.03 10*3/MM3 (ref 0–0.03)
IMM GRANULOCYTES NFR BLD: 0.5 % (ref 0–0.5)
LYMPHOCYTES # BLD AUTO: 1 10*3/MM3 (ref 1–3)
LYMPHOCYTES NFR BLD AUTO: 15.3 % (ref 16–46)
MCH RBC QN AUTO: 27.2 PG (ref 27–33)
MCHC RBC AUTO-ENTMCNC: 31.1 G/DL (ref 33–37)
MCV RBC AUTO: 87.5 FL (ref 80–94)
MONOCYTES # BLD AUTO: 0.79 10*3/MM3 (ref 0.1–0.9)
MONOCYTES NFR BLD AUTO: 12.1 % (ref 0–12)
NEUTROPHILS # BLD AUTO: 4.56 10*3/MM3 (ref 1.4–6.5)
NEUTROPHILS NFR BLD AUTO: 70 % (ref 40–75)
OSMOLALITY SERPL CALC.SUM OF ELEC: 262 MOSM/KG (ref 273–305)
PLATELET # BLD AUTO: 268 10*3/MM3 (ref 130–400)
PMV BLD AUTO: 9.5 FL (ref 6–10)
POTASSIUM BLD-SCNC: 4.1 MMOL/L (ref 3.5–5.3)
PROT SERPL-MCNC: 6.3 G/DL (ref 6–8)
RBC # BLD AUTO: 2.87 10*6/MM3 (ref 4.7–6.1)
SODIUM BLD-SCNC: 129 MMOL/L (ref 135–153)
SODIUM UR-SCNC: 41 MMOL/L
WBC NRBC COR # BLD: 6.52 10*3/MM3 (ref 4.5–12.5)

## 2018-04-25 PROCEDURE — 94799 UNLISTED PULMONARY SVC/PX: CPT

## 2018-04-25 PROCEDURE — 97163 PT EVAL HIGH COMPLEX 45 MIN: CPT

## 2018-04-25 PROCEDURE — 84300 ASSAY OF URINE SODIUM: CPT | Performed by: FAMILY MEDICINE

## 2018-04-25 PROCEDURE — 97116 GAIT TRAINING THERAPY: CPT

## 2018-04-25 PROCEDURE — 97110 THERAPEUTIC EXERCISES: CPT

## 2018-04-25 PROCEDURE — 82962 GLUCOSE BLOOD TEST: CPT

## 2018-04-25 PROCEDURE — 97530 THERAPEUTIC ACTIVITIES: CPT

## 2018-04-25 PROCEDURE — 85025 COMPLETE CBC W/AUTO DIFF WBC: CPT | Performed by: FAMILY MEDICINE

## 2018-04-25 PROCEDURE — 80053 COMPREHEN METABOLIC PANEL: CPT | Performed by: FAMILY MEDICINE

## 2018-04-25 PROCEDURE — 97167 OT EVAL HIGH COMPLEX 60 MIN: CPT

## 2018-04-25 RX ORDER — MELATONIN
1000 DAILY
Status: ON HOLD | COMMUNITY
End: 2018-07-31

## 2018-04-25 RX ORDER — KETOTIFEN FUMARATE 0.35 MG/ML
1 SOLUTION/ DROPS OPHTHALMIC 2 TIMES DAILY
COMMUNITY

## 2018-04-25 RX ADMIN — MECLIZINE HCL 12.5 MG: 12.5 TABLET ORAL at 09:11

## 2018-04-25 RX ADMIN — GUAIFENESIN 600 MG: 600 TABLET, EXTENDED RELEASE ORAL at 22:58

## 2018-04-25 RX ADMIN — FUROSEMIDE 40 MG: 40 TABLET ORAL at 09:11

## 2018-04-25 RX ADMIN — CARVEDILOL 12.5 MG: 6.25 TABLET, FILM COATED ORAL at 22:55

## 2018-04-25 RX ADMIN — Medication 1000 MCG: at 09:11

## 2018-04-25 RX ADMIN — OXYCODONE HYDROCHLORIDE 5 MG: 5 TABLET ORAL at 15:57

## 2018-04-25 RX ADMIN — CARBOXYMETHYLCELLULOSE SODIUM 2 DROP: 5 SOLUTION/ DROPS OPHTHALMIC at 22:56

## 2018-04-25 RX ADMIN — OXYCODONE HYDROCHLORIDE 5 MG: 5 TABLET ORAL at 06:03

## 2018-04-25 RX ADMIN — PANTOPRAZOLE SODIUM 40 MG: 40 TABLET, DELAYED RELEASE ORAL at 22:55

## 2018-04-25 RX ADMIN — GUAIFENESIN 600 MG: 600 TABLET, EXTENDED RELEASE ORAL at 09:11

## 2018-04-25 RX ADMIN — NIFEDIPINE 30 MG: 30 TABLET, EXTENDED RELEASE ORAL at 09:12

## 2018-04-25 RX ADMIN — POTASSIUM CHLORIDE 10 MEQ: 750 TABLET, FILM COATED, EXTENDED RELEASE ORAL at 09:11

## 2018-04-25 RX ADMIN — FLUTICASONE PROPIONATE 2 SPRAY: 50 SPRAY, METERED NASAL at 10:53

## 2018-04-25 RX ADMIN — LEVOTHYROXINE SODIUM 150 MCG: 150 TABLET ORAL at 06:04

## 2018-04-25 RX ADMIN — CHOLECALCIFEROL TAB 10 MCG (400 UNIT) 1000 UNITS: 10 TAB at 09:11

## 2018-04-25 RX ADMIN — OXYCODONE HYDROCHLORIDE 5 MG: 5 TABLET ORAL at 22:55

## 2018-04-25 RX ADMIN — CARVEDILOL 12.5 MG: 6.25 TABLET, FILM COATED ORAL at 09:11

## 2018-04-25 RX ADMIN — PANTOPRAZOLE SODIUM 40 MG: 40 TABLET, DELAYED RELEASE ORAL at 09:11

## 2018-04-25 RX ADMIN — OXYCODONE HYDROCHLORIDE 5 MG: 5 TABLET ORAL at 00:28

## 2018-04-25 RX ADMIN — MECLIZINE HCL 12.5 MG: 12.5 TABLET ORAL at 22:55

## 2018-04-25 RX ADMIN — KETOTIFEN FUMARATE 1 DROP: 0.35 SOLUTION/ DROPS OPHTHALMIC at 09:11

## 2018-04-25 RX ADMIN — KETOTIFEN FUMARATE 1 DROP: 0.35 SOLUTION/ DROPS OPHTHALMIC at 22:54

## 2018-04-25 RX ADMIN — APIXABAN 10 MG: 5 TABLET, FILM COATED ORAL at 09:11

## 2018-04-26 LAB
ANION GAP SERPL CALCULATED.3IONS-SCNC: 5.2 MMOL/L (ref 3.6–11.2)
BUN BLD-MCNC: 24 MG/DL (ref 7–21)
BUN/CREAT SERPL: 17.6 (ref 7–25)
CALCIUM SPEC-SCNC: 8.4 MG/DL (ref 7.7–10)
CHLORIDE SERPL-SCNC: 102 MMOL/L (ref 99–112)
CO2 SERPL-SCNC: 24.8 MMOL/L (ref 24.3–31.9)
CREAT BLD-MCNC: 1.36 MG/DL (ref 0.43–1.29)
GFR SERPL CREATININE-BSD FRML MDRD: 50 ML/MIN/1.73
GLUCOSE BLD-MCNC: 93 MG/DL (ref 70–110)
OSMOLALITY SERPL CALC.SUM OF ELEC: 268.3 MOSM/KG (ref 273–305)
POTASSIUM BLD-SCNC: 4.4 MMOL/L (ref 3.5–5.3)
SODIUM BLD-SCNC: 132 MMOL/L (ref 135–153)

## 2018-04-26 PROCEDURE — 97535 SELF CARE MNGMENT TRAINING: CPT

## 2018-04-26 PROCEDURE — 94799 UNLISTED PULMONARY SVC/PX: CPT

## 2018-04-26 PROCEDURE — 97530 THERAPEUTIC ACTIVITIES: CPT

## 2018-04-26 PROCEDURE — 80048 BASIC METABOLIC PNL TOTAL CA: CPT | Performed by: FAMILY MEDICINE

## 2018-04-26 PROCEDURE — 97116 GAIT TRAINING THERAPY: CPT

## 2018-04-26 PROCEDURE — 97110 THERAPEUTIC EXERCISES: CPT

## 2018-04-26 RX ADMIN — PROBENECID 500 MG: 500 TABLET, FILM COATED ORAL at 09:27

## 2018-04-26 RX ADMIN — OXYCODONE HYDROCHLORIDE 5 MG: 5 TABLET ORAL at 05:55

## 2018-04-26 RX ADMIN — ALFUZOSIN HYDROCHLORIDE 10 MG: 10 TABLET, EXTENDED RELEASE ORAL at 09:26

## 2018-04-26 RX ADMIN — MECLIZINE HCL 12.5 MG: 12.5 TABLET ORAL at 21:00

## 2018-04-26 RX ADMIN — LEVOTHYROXINE SODIUM 150 MCG: 150 TABLET ORAL at 05:55

## 2018-04-26 RX ADMIN — APIXABAN 5 MG: 5 TABLET, FILM COATED ORAL at 08:56

## 2018-04-26 RX ADMIN — NIFEDIPINE 30 MG: 30 TABLET, EXTENDED RELEASE ORAL at 08:56

## 2018-04-26 RX ADMIN — CHOLECALCIFEROL TAB 10 MCG (400 UNIT) 1000 UNITS: 10 TAB at 08:56

## 2018-04-26 RX ADMIN — APIXABAN 5 MG: 5 TABLET, FILM COATED ORAL at 21:01

## 2018-04-26 RX ADMIN — KETOTIFEN FUMARATE 1 DROP: 0.35 SOLUTION/ DROPS OPHTHALMIC at 08:55

## 2018-04-26 RX ADMIN — Medication 1000 MCG: at 08:56

## 2018-04-26 RX ADMIN — OXYCODONE HYDROCHLORIDE 5 MG: 5 TABLET ORAL at 17:03

## 2018-04-26 RX ADMIN — PANTOPRAZOLE SODIUM 40 MG: 40 TABLET, DELAYED RELEASE ORAL at 08:56

## 2018-04-26 RX ADMIN — FLUTICASONE PROPIONATE 2 SPRAY: 50 SPRAY, METERED NASAL at 09:27

## 2018-04-26 RX ADMIN — GUAIFENESIN 600 MG: 600 TABLET, EXTENDED RELEASE ORAL at 21:00

## 2018-04-26 RX ADMIN — GUAIFENESIN 600 MG: 600 TABLET, EXTENDED RELEASE ORAL at 08:57

## 2018-04-26 RX ADMIN — PANTOPRAZOLE SODIUM 40 MG: 40 TABLET, DELAYED RELEASE ORAL at 17:03

## 2018-04-26 RX ADMIN — CARVEDILOL 12.5 MG: 6.25 TABLET, FILM COATED ORAL at 21:00

## 2018-04-26 RX ADMIN — CARVEDILOL 12.5 MG: 6.25 TABLET, FILM COATED ORAL at 08:56

## 2018-04-26 RX ADMIN — MECLIZINE HCL 12.5 MG: 12.5 TABLET ORAL at 08:56

## 2018-04-26 RX ADMIN — FUROSEMIDE 40 MG: 40 TABLET ORAL at 08:56

## 2018-04-26 RX ADMIN — KETOTIFEN FUMARATE 1 DROP: 0.35 SOLUTION/ DROPS OPHTHALMIC at 21:00

## 2018-04-26 RX ADMIN — MAGNESIUM HYDROXIDE 10 ML: 2400 SUSPENSION ORAL at 21:01

## 2018-04-26 RX ADMIN — POTASSIUM CHLORIDE 10 MEQ: 750 TABLET, FILM COATED, EXTENDED RELEASE ORAL at 08:56

## 2018-04-26 RX ADMIN — OXYCODONE HYDROCHLORIDE 5 MG: 5 TABLET ORAL at 11:30

## 2018-04-27 LAB — GLUCOSE BLDC GLUCOMTR-MCNC: 231 MG/DL (ref 70–130)

## 2018-04-27 PROCEDURE — 82962 GLUCOSE BLOOD TEST: CPT

## 2018-04-27 PROCEDURE — 97116 GAIT TRAINING THERAPY: CPT

## 2018-04-27 PROCEDURE — 97110 THERAPEUTIC EXERCISES: CPT

## 2018-04-27 PROCEDURE — 97535 SELF CARE MNGMENT TRAINING: CPT

## 2018-04-27 PROCEDURE — 94799 UNLISTED PULMONARY SVC/PX: CPT

## 2018-04-27 PROCEDURE — 97530 THERAPEUTIC ACTIVITIES: CPT

## 2018-04-27 RX ADMIN — APIXABAN 5 MG: 5 TABLET, FILM COATED ORAL at 20:12

## 2018-04-27 RX ADMIN — NIFEDIPINE 30 MG: 30 TABLET, EXTENDED RELEASE ORAL at 09:30

## 2018-04-27 RX ADMIN — KETOTIFEN FUMARATE 1 DROP: 0.35 SOLUTION/ DROPS OPHTHALMIC at 20:13

## 2018-04-27 RX ADMIN — FUROSEMIDE 40 MG: 40 TABLET ORAL at 09:30

## 2018-04-27 RX ADMIN — CHOLECALCIFEROL TAB 10 MCG (400 UNIT) 1000 UNITS: 10 TAB at 09:29

## 2018-04-27 RX ADMIN — CARVEDILOL 12.5 MG: 6.25 TABLET, FILM COATED ORAL at 09:30

## 2018-04-27 RX ADMIN — MECLIZINE HCL 12.5 MG: 12.5 TABLET ORAL at 20:12

## 2018-04-27 RX ADMIN — GUAIFENESIN 600 MG: 600 TABLET, EXTENDED RELEASE ORAL at 09:30

## 2018-04-27 RX ADMIN — PANTOPRAZOLE SODIUM 40 MG: 40 TABLET, DELAYED RELEASE ORAL at 16:40

## 2018-04-27 RX ADMIN — POTASSIUM CHLORIDE 10 MEQ: 750 TABLET, FILM COATED, EXTENDED RELEASE ORAL at 09:30

## 2018-04-27 RX ADMIN — FLUTICASONE PROPIONATE 2 SPRAY: 50 SPRAY, METERED NASAL at 09:31

## 2018-04-27 RX ADMIN — KETOTIFEN FUMARATE 1 DROP: 0.35 SOLUTION/ DROPS OPHTHALMIC at 09:32

## 2018-04-27 RX ADMIN — LEVOTHYROXINE SODIUM 150 MCG: 150 TABLET ORAL at 05:19

## 2018-04-27 RX ADMIN — GUAIFENESIN 600 MG: 600 TABLET, EXTENDED RELEASE ORAL at 20:12

## 2018-04-27 RX ADMIN — MECLIZINE HCL 12.5 MG: 12.5 TABLET ORAL at 09:30

## 2018-04-27 RX ADMIN — PROBENECID 500 MG: 500 TABLET, FILM COATED ORAL at 09:32

## 2018-04-27 RX ADMIN — OXYCODONE HYDROCHLORIDE 5 MG: 5 TABLET ORAL at 09:30

## 2018-04-27 RX ADMIN — OXYCODONE HYDROCHLORIDE 5 MG: 5 TABLET ORAL at 20:11

## 2018-04-27 RX ADMIN — CARVEDILOL 12.5 MG: 6.25 TABLET, FILM COATED ORAL at 20:12

## 2018-04-27 RX ADMIN — PANTOPRAZOLE SODIUM 40 MG: 40 TABLET, DELAYED RELEASE ORAL at 09:30

## 2018-04-27 RX ADMIN — APIXABAN 5 MG: 5 TABLET, FILM COATED ORAL at 09:30

## 2018-04-27 RX ADMIN — Medication 1000 MCG: at 09:30

## 2018-04-27 RX ADMIN — ALFUZOSIN HYDROCHLORIDE 10 MG: 10 TABLET, EXTENDED RELEASE ORAL at 09:31

## 2018-04-28 PROCEDURE — 94799 UNLISTED PULMONARY SVC/PX: CPT

## 2018-04-28 PROCEDURE — 97116 GAIT TRAINING THERAPY: CPT | Performed by: PHYSICAL THERAPIST

## 2018-04-28 PROCEDURE — 97110 THERAPEUTIC EXERCISES: CPT | Performed by: PHYSICAL THERAPIST

## 2018-04-28 PROCEDURE — 97530 THERAPEUTIC ACTIVITIES: CPT

## 2018-04-28 PROCEDURE — 97530 THERAPEUTIC ACTIVITIES: CPT | Performed by: PHYSICAL THERAPIST

## 2018-04-28 PROCEDURE — 97535 SELF CARE MNGMENT TRAINING: CPT

## 2018-04-28 RX ADMIN — CHOLECALCIFEROL TAB 10 MCG (400 UNIT) 1000 UNITS: 10 TAB at 08:36

## 2018-04-28 RX ADMIN — PROBENECID 500 MG: 500 TABLET, FILM COATED ORAL at 12:26

## 2018-04-28 RX ADMIN — KETOTIFEN FUMARATE 1 DROP: 0.35 SOLUTION/ DROPS OPHTHALMIC at 08:35

## 2018-04-28 RX ADMIN — CARVEDILOL 12.5 MG: 6.25 TABLET, FILM COATED ORAL at 20:36

## 2018-04-28 RX ADMIN — OXYCODONE HYDROCHLORIDE 5 MG: 5 TABLET ORAL at 20:35

## 2018-04-28 RX ADMIN — ALFUZOSIN HYDROCHLORIDE 10 MG: 10 TABLET, EXTENDED RELEASE ORAL at 12:27

## 2018-04-28 RX ADMIN — PANTOPRAZOLE SODIUM 40 MG: 40 TABLET, DELAYED RELEASE ORAL at 08:36

## 2018-04-28 RX ADMIN — MECLIZINE HCL 12.5 MG: 12.5 TABLET ORAL at 08:36

## 2018-04-28 RX ADMIN — CARVEDILOL 12.5 MG: 6.25 TABLET, FILM COATED ORAL at 08:37

## 2018-04-28 RX ADMIN — PANTOPRAZOLE SODIUM 40 MG: 40 TABLET, DELAYED RELEASE ORAL at 17:14

## 2018-04-28 RX ADMIN — Medication 1000 MCG: at 08:35

## 2018-04-28 RX ADMIN — GUAIFENESIN 600 MG: 600 TABLET, EXTENDED RELEASE ORAL at 08:36

## 2018-04-28 RX ADMIN — LEVOTHYROXINE SODIUM 150 MCG: 150 TABLET ORAL at 05:09

## 2018-04-28 RX ADMIN — POTASSIUM CHLORIDE 10 MEQ: 750 TABLET, FILM COATED, EXTENDED RELEASE ORAL at 08:36

## 2018-04-28 RX ADMIN — MECLIZINE HCL 12.5 MG: 12.5 TABLET ORAL at 20:37

## 2018-04-28 RX ADMIN — KETOTIFEN FUMARATE 1 DROP: 0.35 SOLUTION/ DROPS OPHTHALMIC at 20:37

## 2018-04-28 RX ADMIN — FLUTICASONE PROPIONATE 2 SPRAY: 50 SPRAY, METERED NASAL at 17:13

## 2018-04-28 RX ADMIN — FUROSEMIDE 40 MG: 40 TABLET ORAL at 08:36

## 2018-04-28 RX ADMIN — NIFEDIPINE 30 MG: 30 TABLET, EXTENDED RELEASE ORAL at 08:36

## 2018-04-28 RX ADMIN — APIXABAN 5 MG: 5 TABLET, FILM COATED ORAL at 20:36

## 2018-04-28 RX ADMIN — GUAIFENESIN 600 MG: 600 TABLET, EXTENDED RELEASE ORAL at 20:36

## 2018-04-28 RX ADMIN — APIXABAN 5 MG: 5 TABLET, FILM COATED ORAL at 08:37

## 2018-04-29 PROCEDURE — 94799 UNLISTED PULMONARY SVC/PX: CPT

## 2018-04-29 RX ADMIN — CHOLECALCIFEROL TAB 10 MCG (400 UNIT) 1000 UNITS: 10 TAB at 10:03

## 2018-04-29 RX ADMIN — CARVEDILOL 12.5 MG: 6.25 TABLET, FILM COATED ORAL at 20:43

## 2018-04-29 RX ADMIN — KETOTIFEN FUMARATE 1 DROP: 0.35 SOLUTION/ DROPS OPHTHALMIC at 20:43

## 2018-04-29 RX ADMIN — GUAIFENESIN 600 MG: 600 TABLET, EXTENDED RELEASE ORAL at 20:43

## 2018-04-29 RX ADMIN — POTASSIUM CHLORIDE 10 MEQ: 750 TABLET, FILM COATED, EXTENDED RELEASE ORAL at 10:03

## 2018-04-29 RX ADMIN — CARVEDILOL 12.5 MG: 6.25 TABLET, FILM COATED ORAL at 10:04

## 2018-04-29 RX ADMIN — PANTOPRAZOLE SODIUM 40 MG: 40 TABLET, DELAYED RELEASE ORAL at 18:02

## 2018-04-29 RX ADMIN — MECLIZINE HCL 12.5 MG: 12.5 TABLET ORAL at 10:04

## 2018-04-29 RX ADMIN — LEVOTHYROXINE SODIUM 150 MCG: 150 TABLET ORAL at 05:02

## 2018-04-29 RX ADMIN — NIFEDIPINE 30 MG: 30 TABLET, EXTENDED RELEASE ORAL at 10:03

## 2018-04-29 RX ADMIN — ALFUZOSIN HYDROCHLORIDE 10 MG: 10 TABLET, EXTENDED RELEASE ORAL at 10:02

## 2018-04-29 RX ADMIN — GUAIFENESIN 600 MG: 600 TABLET, EXTENDED RELEASE ORAL at 10:02

## 2018-04-29 RX ADMIN — FLUTICASONE PROPIONATE 2 SPRAY: 50 SPRAY, METERED NASAL at 10:05

## 2018-04-29 RX ADMIN — MECLIZINE HCL 12.5 MG: 12.5 TABLET ORAL at 20:43

## 2018-04-29 RX ADMIN — APIXABAN 5 MG: 5 TABLET, FILM COATED ORAL at 10:04

## 2018-04-29 RX ADMIN — PANTOPRAZOLE SODIUM 40 MG: 40 TABLET, DELAYED RELEASE ORAL at 10:03

## 2018-04-29 RX ADMIN — APIXABAN 5 MG: 5 TABLET, FILM COATED ORAL at 20:42

## 2018-04-29 RX ADMIN — KETOTIFEN FUMARATE 1 DROP: 0.35 SOLUTION/ DROPS OPHTHALMIC at 10:04

## 2018-04-29 RX ADMIN — PROBENECID 500 MG: 500 TABLET, FILM COATED ORAL at 10:02

## 2018-04-29 RX ADMIN — FUROSEMIDE 40 MG: 40 TABLET ORAL at 10:03

## 2018-04-29 RX ADMIN — Medication 1000 MCG: at 10:02

## 2018-04-29 RX ADMIN — OXYCODONE HYDROCHLORIDE 5 MG: 5 TABLET ORAL at 20:42

## 2018-04-30 PROCEDURE — 94799 UNLISTED PULMONARY SVC/PX: CPT

## 2018-04-30 PROCEDURE — 97530 THERAPEUTIC ACTIVITIES: CPT

## 2018-04-30 PROCEDURE — 97110 THERAPEUTIC EXERCISES: CPT

## 2018-04-30 PROCEDURE — 97116 GAIT TRAINING THERAPY: CPT

## 2018-04-30 RX ADMIN — GUAIFENESIN 600 MG: 600 TABLET, EXTENDED RELEASE ORAL at 09:16

## 2018-04-30 RX ADMIN — Medication 1000 MCG: at 09:16

## 2018-04-30 RX ADMIN — LEVOTHYROXINE SODIUM 150 MCG: 150 TABLET ORAL at 05:31

## 2018-04-30 RX ADMIN — APIXABAN 5 MG: 5 TABLET, FILM COATED ORAL at 09:16

## 2018-04-30 RX ADMIN — APIXABAN 5 MG: 5 TABLET, FILM COATED ORAL at 20:30

## 2018-04-30 RX ADMIN — ALFUZOSIN HYDROCHLORIDE 10 MG: 10 TABLET, EXTENDED RELEASE ORAL at 09:17

## 2018-04-30 RX ADMIN — CARBOXYMETHYLCELLULOSE SODIUM 2 DROP: 5 SOLUTION/ DROPS OPHTHALMIC at 20:31

## 2018-04-30 RX ADMIN — POTASSIUM CHLORIDE 10 MEQ: 750 TABLET, FILM COATED, EXTENDED RELEASE ORAL at 09:17

## 2018-04-30 RX ADMIN — CHOLECALCIFEROL TAB 10 MCG (400 UNIT) 1000 UNITS: 10 TAB at 09:16

## 2018-04-30 RX ADMIN — PANTOPRAZOLE SODIUM 40 MG: 40 TABLET, DELAYED RELEASE ORAL at 18:17

## 2018-04-30 RX ADMIN — KETOTIFEN FUMARATE 1 DROP: 0.35 SOLUTION/ DROPS OPHTHALMIC at 20:31

## 2018-04-30 RX ADMIN — FUROSEMIDE 40 MG: 40 TABLET ORAL at 09:16

## 2018-04-30 RX ADMIN — OXYCODONE HYDROCHLORIDE 5 MG: 5 TABLET ORAL at 20:30

## 2018-04-30 RX ADMIN — NIFEDIPINE 30 MG: 30 TABLET, EXTENDED RELEASE ORAL at 09:17

## 2018-04-30 RX ADMIN — GUAIFENESIN 600 MG: 600 TABLET, EXTENDED RELEASE ORAL at 20:30

## 2018-04-30 RX ADMIN — PANTOPRAZOLE SODIUM 40 MG: 40 TABLET, DELAYED RELEASE ORAL at 09:16

## 2018-04-30 RX ADMIN — PROBENECID 500 MG: 500 TABLET, FILM COATED ORAL at 09:17

## 2018-04-30 RX ADMIN — MECLIZINE HCL 12.5 MG: 12.5 TABLET ORAL at 09:17

## 2018-04-30 RX ADMIN — CARVEDILOL 12.5 MG: 6.25 TABLET, FILM COATED ORAL at 09:16

## 2018-04-30 RX ADMIN — MECLIZINE HCL 12.5 MG: 12.5 TABLET ORAL at 20:30

## 2018-04-30 RX ADMIN — MAGNESIUM HYDROXIDE 10 ML: 2400 SUSPENSION ORAL at 05:31

## 2018-04-30 RX ADMIN — FLUTICASONE PROPIONATE 2 SPRAY: 50 SPRAY, METERED NASAL at 09:17

## 2018-04-30 RX ADMIN — CARVEDILOL 12.5 MG: 6.25 TABLET, FILM COATED ORAL at 20:30

## 2018-04-30 RX ADMIN — KETOTIFEN FUMARATE 1 DROP: 0.35 SOLUTION/ DROPS OPHTHALMIC at 09:17

## 2018-05-01 LAB
ANION GAP SERPL CALCULATED.3IONS-SCNC: 5.8 MMOL/L (ref 3.6–11.2)
BASOPHILS # BLD AUTO: 0.02 10*3/MM3 (ref 0–0.3)
BASOPHILS NFR BLD AUTO: 0.2 % (ref 0–2)
BUN BLD-MCNC: 24 MG/DL (ref 7–21)
BUN/CREAT SERPL: 18.8 (ref 7–25)
CALCIUM SPEC-SCNC: 8.1 MG/DL (ref 7.7–10)
CHLORIDE SERPL-SCNC: 100 MMOL/L (ref 99–112)
CO2 SERPL-SCNC: 26.2 MMOL/L (ref 24.3–31.9)
CREAT BLD-MCNC: 1.28 MG/DL (ref 0.43–1.29)
DEPRECATED RDW RBC AUTO: 48.8 FL (ref 37–54)
EOSINOPHIL # BLD AUTO: 0.11 10*3/MM3 (ref 0–0.7)
EOSINOPHIL NFR BLD AUTO: 1.2 % (ref 0–7)
ERYTHROCYTE [DISTWIDTH] IN BLOOD BY AUTOMATED COUNT: 15.7 % (ref 11.5–14.5)
GFR SERPL CREATININE-BSD FRML MDRD: 54 ML/MIN/1.73
GLUCOSE BLD-MCNC: 102 MG/DL (ref 70–110)
HCT VFR BLD AUTO: 24.8 % (ref 42–52)
HGB BLD-MCNC: 7.4 G/DL (ref 14–18)
IMM GRANULOCYTES # BLD: 0.06 10*3/MM3 (ref 0–0.03)
IMM GRANULOCYTES NFR BLD: 0.7 % (ref 0–0.5)
LYMPHOCYTES # BLD AUTO: 1.3 10*3/MM3 (ref 1–3)
LYMPHOCYTES NFR BLD AUTO: 14.6 % (ref 16–46)
MCH RBC QN AUTO: 26 PG (ref 27–33)
MCHC RBC AUTO-ENTMCNC: 29.8 G/DL (ref 33–37)
MCV RBC AUTO: 87 FL (ref 80–94)
MONOCYTES # BLD AUTO: 1.14 10*3/MM3 (ref 0.1–0.9)
MONOCYTES NFR BLD AUTO: 12.8 % (ref 0–12)
NEUTROPHILS # BLD AUTO: 6.29 10*3/MM3 (ref 1.4–6.5)
NEUTROPHILS NFR BLD AUTO: 70.5 % (ref 40–75)
OSMOLALITY SERPL CALC.SUM OF ELEC: 268.8 MOSM/KG (ref 273–305)
PLATELET # BLD AUTO: 225 10*3/MM3 (ref 130–400)
PMV BLD AUTO: 8.6 FL (ref 6–10)
POTASSIUM BLD-SCNC: 4.2 MMOL/L (ref 3.5–5.3)
RBC # BLD AUTO: 2.85 10*6/MM3 (ref 4.7–6.1)
SODIUM BLD-SCNC: 132 MMOL/L (ref 135–153)
WBC NRBC COR # BLD: 8.92 10*3/MM3 (ref 4.5–12.5)

## 2018-05-01 PROCEDURE — 80048 BASIC METABOLIC PNL TOTAL CA: CPT | Performed by: FAMILY MEDICINE

## 2018-05-01 PROCEDURE — 97116 GAIT TRAINING THERAPY: CPT

## 2018-05-01 PROCEDURE — 94799 UNLISTED PULMONARY SVC/PX: CPT

## 2018-05-01 PROCEDURE — 97530 THERAPEUTIC ACTIVITIES: CPT

## 2018-05-01 PROCEDURE — 85025 COMPLETE CBC W/AUTO DIFF WBC: CPT | Performed by: FAMILY MEDICINE

## 2018-05-01 PROCEDURE — 97535 SELF CARE MNGMENT TRAINING: CPT

## 2018-05-01 RX ADMIN — CHOLECALCIFEROL TAB 10 MCG (400 UNIT) 1000 UNITS: 10 TAB at 09:17

## 2018-05-01 RX ADMIN — NIFEDIPINE 30 MG: 30 TABLET, EXTENDED RELEASE ORAL at 09:17

## 2018-05-01 RX ADMIN — KETOTIFEN FUMARATE 1 DROP: 0.35 SOLUTION/ DROPS OPHTHALMIC at 09:17

## 2018-05-01 RX ADMIN — CARVEDILOL 12.5 MG: 6.25 TABLET, FILM COATED ORAL at 09:17

## 2018-05-01 RX ADMIN — LEVOTHYROXINE SODIUM 150 MCG: 150 TABLET ORAL at 05:42

## 2018-05-01 RX ADMIN — Medication 1000 MCG: at 09:17

## 2018-05-01 RX ADMIN — FUROSEMIDE 40 MG: 40 TABLET ORAL at 09:16

## 2018-05-01 RX ADMIN — POTASSIUM CHLORIDE 10 MEQ: 750 TABLET, FILM COATED, EXTENDED RELEASE ORAL at 09:17

## 2018-05-01 RX ADMIN — GUAIFENESIN 600 MG: 600 TABLET, EXTENDED RELEASE ORAL at 20:14

## 2018-05-01 RX ADMIN — KETOTIFEN FUMARATE 1 DROP: 0.35 SOLUTION/ DROPS OPHTHALMIC at 20:14

## 2018-05-01 RX ADMIN — PROBENECID 500 MG: 500 TABLET, FILM COATED ORAL at 09:16

## 2018-05-01 RX ADMIN — APIXABAN 5 MG: 5 TABLET, FILM COATED ORAL at 20:15

## 2018-05-01 RX ADMIN — OXYCODONE HYDROCHLORIDE 5 MG: 5 TABLET ORAL at 20:14

## 2018-05-01 RX ADMIN — CARVEDILOL 12.5 MG: 6.25 TABLET, FILM COATED ORAL at 20:14

## 2018-05-01 RX ADMIN — APIXABAN 5 MG: 5 TABLET, FILM COATED ORAL at 09:17

## 2018-05-01 RX ADMIN — GUAIFENESIN 600 MG: 600 TABLET, EXTENDED RELEASE ORAL at 09:16

## 2018-05-01 RX ADMIN — PANTOPRAZOLE SODIUM 40 MG: 40 TABLET, DELAYED RELEASE ORAL at 09:17

## 2018-05-01 RX ADMIN — MECLIZINE HCL 12.5 MG: 12.5 TABLET ORAL at 20:14

## 2018-05-01 RX ADMIN — PANTOPRAZOLE SODIUM 40 MG: 40 TABLET, DELAYED RELEASE ORAL at 17:46

## 2018-05-01 RX ADMIN — MECLIZINE HCL 12.5 MG: 12.5 TABLET ORAL at 09:17

## 2018-05-01 RX ADMIN — ALFUZOSIN HYDROCHLORIDE 10 MG: 10 TABLET, EXTENDED RELEASE ORAL at 09:16

## 2018-05-01 NOTE — PLAN OF CARE
Problem: Patient Care Overview  Goal: Plan of Care Review   05/01/18 1437   Patient Care Overview   IRF Plan of Care Review progress ongoing, continue   Progress, Functional Goals demonstrating adequate progress   Coping/Psychosocial   Plan of Care Reviewed With patient   OTHER   Outcome Summary Patient continues to demonstrate increased fatigue with all activities. Continued skilled care per POc required for further strengthening and improved activity tolerance.

## 2018-05-01 NOTE — PROGRESS NOTES
Inpatient Rehabilitation Functional Measures Assessment    Functional Measures  DERREK Eating:  DERREK Grooming: Grooming Score = 5. Patient is supervision/set-up for grooming,  requiring: Setting out grooming equipment. No assistive devices were required.  DERREK Bathing:  DERREK Upper Body Dressing:  DERREK Lower Body Dressing:  DERREK Toileting:    DERREK Bladder Management  Level of Assistance:  Frequency/Number of Accidents this Shift:    DERREK Bowel Management  Level of Assistance:  Frequency/Number of Accidents this Shift:    DERREK Bed/Chair/Wheelchair Transfer:  Bed/chair/wheelchair Transfer Score = 4.  Patient performs 75% or more of effort and minimal assistance (little/incidental  help/lifting of one limb/steadying) for transferring to and from the  bed/chair/wheelchair, requiring: Steadying. Hand placement. No assistive devices  were required.  DERREK Toilet Transfer:  DERREK Tub/Shower Transfer:    Previously Documented Mode of Locomotion at Discharge:  [facility] Expected Mode of Locomotion at Discharge:  DERREK Walk/Wheelchair:  DERREK Stairs:    DERREK Comprehension:  DERREK Expression:  DERREK Social Interaction:  [facility] Problem Solving:  DERREK Memory:    Therapy Mode Minutes  Occupational Therapy: Individual: 90 minutes.  Physical Therapy:  Speech Language Pathology:    Discharge Functional Goals:    Signed by: Tiffanie Bell, Occupational Therapist

## 2018-05-01 NOTE — PLAN OF CARE
Problem: Patient Care Overview  Goal: Plan of Care Review  Outcome: Ongoing (interventions implemented as appropriate)   05/01/18 1556   Patient Care Overview   IRF Plan of Care Review progress ongoing, continue   Progress, Functional Goals demonstrating adequate progress   Coping/Psychosocial   Plan of Care Reviewed With patient       Problem: Skin Injury Risk (Adult)  Goal: Skin Health and Integrity  Outcome: Ongoing (interventions implemented as appropriate)      Problem: Functional Mobility Impairment (IRF) (Adult)  Goal: Optimal/Safe Level of [place] with Mobility  Outcome: Ongoing (interventions implemented as appropriate)      Problem: Wound (Includes Pressure Injury) (Adult)  Goal: Signs and Symptoms of Listed Potential Problems Will be Absent, Minimized or Managed (Wound)  Outcome: Ongoing (interventions implemented as appropriate)

## 2018-05-01 NOTE — PROGRESS NOTES
Inpatient Rehabilitation Functional Measures Assessment    Functional Measures  DERREK Eating:  DERREK Grooming:  DERREK Bathing:  DERREK Upper Body Dressing:  DERREK Lower Body Dressing:  DERREK Toileting:    DERREK Bladder Management  Level of Assistance:  Frequency/Number of Accidents this Shift:    DERREK Bowel Management  Level of Assistance:  Frequency/Number of Accidents this Shift:    DERREK Bed/Chair/Wheelchair Transfer:  Bed/chair/wheelchair Transfer Score = 4.  Patient performs 75% or more of effort and minimal assistance (little/incidental  help/lifting of one limb/steadying) for transferring to and from the  bed/chair/wheelchair, requiring: Contact guard. Patient requires the following  assistive device(s): Bed rails. Arm rest. Walker.  DERREK Toilet Transfer:  Toilet Transfer Score = 4.  Patient performs 75% or more  of effort and minimal assistance (little/incidental help/steadying) for  transferring to and from the toilet/commode, requiring: Patient requires the  following assistive device(s): Grab bars. Safety frame/over the toilet.  DERREK Tub/Shower Transfer:    Previously Documented Mode of Locomotion at Discharge:  DERREK Expected Mode of Locomotion at Discharge:  DERREK Walk/Wheelchair:  WHEELCHAIR OBSERVATION   Activity was not observed.    WALK OBSERVATION   Walk Distance Scale = 2.  Distance walked is 50 -149 feet. Walk Score = 2.  Patient performs 75% or more of effort and requires minimal assistance.  Incidental assistance, contact guard or steadying was provided. Patient walked a  distance of 100 feet. Patient requires the following assistive device(s):  Rolling walker.  DERREK Stairs:  Activity was not observed.    DERREK Comprehension:  DERREK Expression:  DERREK Social Interaction:  DERREK Problem Solving:  DERREK Memory:    Therapy Mode Minutes  Occupational Therapy:  Physical Therapy: Individual: 45 minutes.  Speech Language Pathology:    Discharge Functional Goals:    Signed by: Catie Cooper PTA

## 2018-05-01 NOTE — PLAN OF CARE
Problem: Patient Care Overview  Goal: Plan of Care Review  Outcome: Ongoing (interventions implemented as appropriate)   05/01/18 9926   Patient Care Overview   IRF Plan of Care Review progress ongoing, continue   Progress, Functional Goals progress more gradual than expected   Coping/Psychosocial   Plan of Care Reviewed With patient   OTHER   Outcome Summary Pt. w/ good tolerance and rest breaks provided. Continue w/ current POC.

## 2018-05-01 NOTE — THERAPY TREATMENT NOTE
Inpatient Rehabilitation - Physical Therapy Treatment Note   Oscar     Patient Name: Carlos Cee  : 1934  MRN: [id number]    Today's Date: 2018                 Admit Date: 2018      Visit Dx:    No diagnosis found.    Patient Active Problem List   Diagnosis   • Debility       Therapy Treatment          IRF Treatment Summary     Row Name 18 1435             Evaluation/Treatment Time and Intent    Document Type therapy note (daily note)  -RF      Mode of Treatment individual therapy;physical therapy  -RF      Patient/Family Observations Pt agreeable to treatment session with increased fatigue noted. Pt refused PM treatment session with c/o fatigue and pain.   -RF      Recorded by [RF] Catie Cooper PTA      Row Name 18 1435             Cognition/Psychosocial- PT/OT    Affect/Mental Status (Cognitive) WFL  -RF      Orientation Status (Cognition) oriented x 3  -RF      Follows Commands (Cognition) WFL  -RF      Recorded by [RF] Catie Cooper PTA      Row Name 18 1435             Mobility    Extremity Weight-bearing Status left lower extremity;right lower extremity  -RF      Left Lower Extremity (Weight-bearing Status) weight-bearing as tolerated (WBAT)  -RF      Right Lower Extremity (Weight-bearing Status) weight-bearing as tolerated (WBAT)  -RF      Recorded by [RF] Catie Cooper PTA      Row Name 18 1435             Bed Mobility Assessment/Treatment    Bed Mobility Assessment/Treatment supine-sit;sit-supine  -RF      Supine-Sit [place] (Bed Mobility) minimum assist (75% patient effort);verbal cues;supervision;moderate assist (50% patient effort)  -RF      Sit-Supine [place] (Bed Mobility) minimum assist (75% patient effort);nonverbal cues (demo/gesture);verbal cues;moderate assist (50% patient effort)  -RF      Bed Mobility, Safety Issues decreased use of legs for bridging/pushing  -RF      Assistive Device (Bed Mobility) bed rails  -RF      Recorded  by [RF] Catie Cooper PTA      Row Name 05/01/18 1435             Transfer Assessment/Treatment    Transfer Assessment/Treatment bed-chair transfer;chair-bed transfer;sit-stand transfer;stand-sit transfer;stand pivot/stand step transfer;toilet transfer  -RF      Bed-Chair [place] (Transfers) minimum assist (75% patient effort);nonverbal cues (demo/gesture);verbal cues  -RF      Chair-Bed [place] (Transfers) minimum assist (75% patient effort);nonverbal cues (demo/gesture);verbal cues;moderate assist (50% patient effort)  -RF      Assistive Device (Bed-Chair Transfers) walker, front-wheeled  -RF      Sit-Stand [place] (Transfers) minimum assist (75% patient effort);nonverbal cues (demo/gesture);verbal cues;moderate assist (50% patient effort)  -RF      Stand-Sit [place] (Transfers) minimum assist (75% patient effort);nonverbal cues (demo/gesture);verbal cues;moderate assist (50% patient effort)  -RF      [place] Level (Toilet Transfer) minimum assist (75% patient effort);nonverbal cues (demo/gesture);verbal cues  -RF      Assistive Device (Toilet Transfer) grab bars/safety frame  -RF      Recorded by [RF] Catie Cooper PTA      Row Name 05/01/18 1435             Chair-Bed Transfer    Assistive Device (Chair-Bed Transfers) walker, front-wheeled  -RF      Recorded by [RF] Catie Cooper PTA      Row Name 05/01/18 1435             Sit-Stand Transfer    Assistive Device (Sit-Stand Transfers) walker, front-wheeled  -RF      Recorded by [RF] Catie Cooper PTA      Row Name 05/01/18 1435             Stand-Sit Transfer    Assistive Device (Stand-Sit Transfers) walker, front-wheeled  -RF      Recorded by [RF] Catie Cooper PTA      Row Name 05/01/18 1435             Stand Pivot/Stand Step Transfer    Stand Pivot/Stand Step [place] minimum assist (75% patient effort);nonverbal cues (demo/gesture);verbal cues;moderate assist (50% patient effort)  -RF      Assistive Device (Stand  Pivot Stand Step Transfer) walker, front-wheeled  -RF      Recorded by [RF] Catie Cooper, [facility] Name 05/01/18 1435             Toilet Transfer    Type (Toilet Transfer) stand pivot/stand step  -RF      [facility]18 1432             Gait/Stairs Assessment/Training    Gait/Stairs Assessment/Training gait/ambulation independence;gait/ambulation assistive device;distance ambulated;gait pattern  -RF      [place] Level (Gait) verbal cues;nonverbal cues (demo/gesture);contact guard;minimum assist (75% patient effort)  -RF      Assistive Device (Gait) walker, front-wheeled  -RF      Distance in Feet (Gait) 100 X2  -RF      Pattern (Gait) step-to  -RF      Deviations/Abnormal Patterns (Gait) antalgic;andreea decreased;gait speed decreased;stride length decreased  -RF      Bilateral Gait Deviations forward flexed posture  -RF      Maintains Weight-bearing Status (Gait) able to maintain  -RF      Comment (Gait/Stairs) Increased fatigue noted  -RF      Recorded by [RF] Catie Cooper, [facility] Name 05/01/18 1437             Safety Issues, Functional Mobility    Impairments Affecting Function (Mobility) balance;coordination;endurance/activity tolerance;shortness of breath;strength  -RF      Recorded by [RF] Catie Cooper, [facility] Name 05/01/18 1435             Sensory    Hearing Status hearing impairment, right  -RF      Recorded by [RF] Catie Cooper, [facility] Name 05/01/18 1435             Vision Assessment/Intervention    Visual Impairment/Limitations WFL  -RF      [facility] 05/01/18 1434             Pain Scale: Numbers Pre/Post-Treatment    Pain Scale: Numbers, Pretreatment 5/10   5/10-at first session, 4/10 at second session  -RF      Pain Scale: Numbers, Post-Treatment 4/10   at first and second session.  -RF      Pain Location - Side Right  -RF      Pain Location --   LE  -RF      Recorded by  [RF] Catie Cooper PTA      Row Name 05/01/18 1435             Pain Scale: FACES Pre/Post-Treatment    Pain: FACES Scale, Pretreatment 4-->hurts little more  -RF      Pain: FACES Scale, Post-Treatment 4-->hurts little more  -RF      Recorded by [RF] Catie Cooper PTA      Row Name 05/01/18 1435             Edema Assessment & Management    Location (Edema) lower extremity, right  -RF      Recorded by [RF] Catie Cooper PTA      Row Name 05/01/18 1435             Positioning and Restraints    Pre-Treatment Position in bed  -RF      In Wheelchair sitting;with OT  -RF      Recorded by [RF] Catie Cooper PTA        User Key  (r) = Recorded By, (t) = Taken By, (c) = Cosigned By    Initials Name Effective Dates    RF Catie Cooper PTA 03/07/18 -           Wound 04/23/18 1945 Right lateral calf surgical (Active)   Dressing Appearance intact;other (see comments) 4/30/2018  7:25 PM   Wound Output (mL) 400 4/30/2018  7:25 PM       NPWT (Negative Pressure Wound Therapy) 04/25/18 1600 Right Lateral Calf (Active)   Therapy Setting continuous therapy 4/30/2018  7:25 PM       Physical Therapy Education     Title: PT OT SLP Therapies (Active)     Topic: Physical Therapy (Done)     Point: Mobility training (Done)    Learning Progress Summary     Learner Status Readiness Method Response Comment Documented by    Patient Done Acceptance E VU  RF 05/01/18 1437     Done Acceptance E VU  RF 04/30/18 1611     Done Acceptance E VU,NR  BE 04/28/18 1340     Done Acceptance E VU  KB 04/27/18 1710     Done Acceptance E,D VU,NR  AG 04/26/18 1635     Done Acceptance E,D VU,NR  AG 04/25/18 1731          Point: Home exercise program (Done)    Learning Progress Summary     Learner Status Readiness Method Response Comment Documented by    Patient Done Acceptance E VU  RF 05/01/18 1437     Done Acceptance E VU  RF 04/30/18 1611     Done Acceptance E VU,NR  BE 04/28/18 1340     Done Acceptance E VU  KB 04/27/18 1710     Done  Acceptance E,D VU,NR  AG 04/26/18 1635     Done Acceptance E,D VU,NR  AG 04/25/18 1731          Point: Body mechanics (Done)    Learning Progress Summary     Learner Status Readiness Method Response Comment Documented by    Patient Done Acceptance E VU  RF 05/01/18 1437     Done Acceptance E VU  RF 04/30/18 1611     Done Acceptance E VU,NR  BE 04/28/18 1340     Done Acceptance E VU  KB 04/27/18 1710     Done Acceptance E,D VU,NR  AG 04/26/18 1635     Done Acceptance E,D VU,NR  AG 04/25/18 1731          Point: Precautions (Done)    Learning Progress Summary     Learner Status Readiness Method Response Comment Documented by    Patient Done Acceptance E VU  RF 05/01/18 1437     Done Acceptance E VU  RF 04/30/18 1611     Done Acceptance E VU,NR  BE 04/28/18 1340     Done Acceptance E VU  KB 04/27/18 1710     Done Acceptance E,D VU,NR  AG 04/26/18 1635     Done Acceptance E,D VU,NR  AG 04/25/18 1731                      User Key     Initials Effective Dates Name Provider Type Discipline     04/03/18 -  Agnieszka Cool, PT Physical Therapist PT    BE 04/03/18 -  Lilliana Hyde, PT Physical Therapist PT     03/07/18 -  Catie Cooper, PTA Physical Therapy Assistant PT     12/20/17 -  Kay Pena PTA Physical Therapy Assistant PT                  PT Recommendation and Plan         Plan of Care Reviewed With: patient  IRF Plan of Care Review: progress ongoing, continue  Progress, Functional Goals: demonstrating adequate progress  Outcome Summary: Patient continues to demonstrate increased fatigue with all activities. Continued skilled care per POc required for further strengthening and improved activity tolerance.                    Time Calculation:           PT Charges     Row Name 05/01/18 1438             Time Calculation    Start Time 0830  -RF      Stop Time 0915  -RF      Time Calculation (min) 45 min  -RF      PT Received On 05/01/18  -RF      PT - Next Appointment 05/02/18  -RF      PT Goal Re-Cert Due  Date 05/02/18  -RF         Time Calculation- PT    Total Timed Code Minutes- PT 45 minute(s)  -RF        User Key  (r) = Recorded By, (t) = Taken By, (c) = Cosigned By    Initials Name Provider Type    RF Catie Cooper PTA Physical Therapy Assistant            Therapy Charges for Today     Code Description Service Date Service Provider Modifiers Qty    24560668921 HC GAIT TRAINING EA 15 MIN 4/30/2018 Catie oCoper, PTA GP 3    30599795284 HC PT THER PROC EA 15 MIN 4/30/2018 Catie Cooper, PTA GP 1    43339066261 HC PT THERAPEUTIC ACT EA 15 MIN 4/30/2018 Catie Cooper, PTA GP 1    11478622442 HC PT THER SUPP EA 15 MIN 4/30/2018 Catie Cooper, PTA GP 2    72903981248 HC PT THER PROC EA 15 MIN 4/30/2018 Catie Cooper, PTA GP 1    44664203688 HC GAIT TRAINING EA 15 MIN 5/1/2018 Catie Cooper, PTA GP 2    45289840492 HC PT THERAPEUTIC ACT EA 15 MIN 5/1/2018 Catie Cooper, PTA GP 1    08388713490 HC PT THER SUPP EA 15 MIN 5/1/2018 Catie Cooper, PTA GP 2                   Catie Cooper, PTA  5/1/2018

## 2018-05-01 NOTE — THERAPY TREATMENT NOTE
Inpatient Rehabilitation - Occupational Therapy Treatment Note     Oscar     Patient Name: Carlos Cee  : 1934  MRN: [id number]    Today's Date: 2018                 Admit Date: 2018      Visit Dx:  No diagnosis found.    Patient Active Problem List   Diagnosis   • Debility         Therapy Treatment          IRF Treatment Summary     Row Name 18 1449 18 1435          Evaluation/Treatment Time and Intent    Document Type therapy note (daily note)  -AB therapy note (daily note)  -RF     Mode of Treatment occupational therapy  -AB individual therapy;physical therapy  -RF     Patient/Family Observations Pt. agreeable to therapy, no complaints  -AB Pt agreeable to treatment session with increased fatigue noted. Pt refused PM treatment session with c/o fatigue and pain.   -RF     Recorded by [AB] Tiffanie Bell OT [RF] Catie Cooper PTA     Row Name 18 1449 18 1435          Cognition/Psychosocial- PT/OT    Affect/Mental Status (Cognitive) WFL  -AB WFL  -RF     Orientation Status (Cognition)  -- oriented x 3  -RF     Follows Commands (Cognition) WFL  -AB WFL  -RF     Recorded by [AB] Tiffanie Bell OT [RF] Catie Cooper PTA     Row Name 18 1435             Mobility    Extremity Weight-bearing Status left lower extremity;right lower extremity  -RF      Left Lower Extremity (Weight-bearing Status) weight-bearing as tolerated (WBAT)  -RF      Right Lower Extremity (Weight-bearing Status) weight-bearing as tolerated (WBAT)  -RF      Recorded by [RF] Catie Cooper PTA      Row Name 18 1435             Bed Mobility Assessment/Treatment    Bed Mobility Assessment/Treatment supine-sit;sit-supine  -RF      Supine-Sit [place] (Bed Mobility) minimum assist (75% patient effort);verbal cues;supervision;moderate assist (50% patient effort)  -RF      Sit-Supine [place] (Bed Mobility) minimum assist (75% patient effort);nonverbal cues  (demo/gesture);verbal cues;moderate assist (50% patient effort)  -RF      Bed Mobility, Safety Issues decreased use of legs for bridging/pushing  -RF      Assistive Device (Bed Mobility) bed rails  -RF      Recorded by [RF] Catie Cooper PTA      Row Name 05/01/18 1449 05/01/18 1435          Transfer Assessment/Treatment    Transfer Assessment/Treatment  -- bed-chair transfer;chair-bed transfer;sit-stand transfer;stand-sit transfer;stand pivot/stand step transfer;toilet transfer  -RF     Bed-Chair [place] (Transfers)  -- minimum assist (75% patient effort);nonverbal cues (demo/gesture);verbal cues  -RF     Chair-Bed [place] (Transfers) minimum assist (75% patient effort);verbal cues  -AB minimum assist (75% patient effort);nonverbal cues (demo/gesture);verbal cues;moderate assist (50% patient effort)  -RF     Assistive Device (Bed-Chair Transfers)  -- walker, front-wheeled  -RF     Sit-Stand [place] (Transfers)  -- minimum assist (75% patient effort);nonverbal cues (demo/gesture);verbal cues;moderate assist (50% patient effort)  -RF     Stand-Sit [place] (Transfers)  -- minimum assist (75% patient effort);nonverbal cues (demo/gesture);verbal cues;moderate assist (50% patient effort)  -RF     [place] Level (Toilet Transfer)  -- minimum assist (75% patient effort);nonverbal cues (demo/gesture);verbal cues  -RF     Assistive Device (Toilet Transfer)  -- grab bars/safety frame  -RF     Recorded by [AB] Tiffanie Bell OT [RF] Catie Cooper PTA     Row Name 05/01/18 1449 05/01/18 1435          Chair-Bed Transfer    Assistive Device (Chair-Bed Transfers) walker, front-wheeled;wheelchair  -AB walker, front-wheeled  -RF     Recorded by [AB] Tiffanie Bell OT [RF] Catie Cooper PTA     Row Name 05/01/18 1435             Sit-Stand Transfer    Assistive Device (Sit-Stand Transfers) walker, front-wheeled  -RF      Recorded by [RF] Catie Cooper PTA      Row Name 05/01/18  1435             Stand-Sit Transfer    Assistive Device (Stand-Sit Transfers) walker, front-wheeled  -RF      Recorded by [RF] Catie Cooper PTA      Row Name 05/01/18 1435             Stand Pivot/Stand Step Transfer    Stand Pivot/Stand Step [place] minimum assist (75% patient effort);nonverbal cues (demo/gesture);verbal cues;moderate assist (50% patient effort)  -RF      Assistive Device (Stand Pivot Stand Step Transfer) walker, front-wheeled  -RF      Recorded by [RF] Catie Cooper PTA      Row Name 05/01/18 1435             Toilet Transfer    Type (Toilet Transfer) stand pivot/stand step  -RF      Recorded by [RF] Catie Cooper PTA      Row Name 05/01/18 1435             Gait/Stairs Assessment/Training    Gait/Stairs Assessment/Training gait/ambulation independence;gait/ambulation assistive device;distance ambulated;gait pattern  -RF      [place] Level (Gait) verbal cues;nonverbal cues (demo/gesture);contact guard;minimum assist (75% patient effort)  -RF      Assistive Device (Gait) walker, front-wheeled  -RF      Distance in Feet (Gait) 100 X2  -RF      Pattern (Gait) step-to  -RF      Deviations/Abnormal Patterns (Gait) antalgic;andreea decreased;gait speed decreased;stride length decreased  -RF      Bilateral Gait Deviations forward flexed posture  -RF      Maintains Weight-bearing Status (Gait) able to maintain  -RF      Comment (Gait/Stairs) Increased fatigue noted  -RF      Recorded by [RF] Catie Cooper PTA      Row Name 05/01/18 1449 05/01/18 1435          Safety Issues, Functional Mobility    Impairments Affecting Function (Mobility) endurance/activity tolerance;balance;shortness of breath;strength;pain  -AB balance;coordination;endurance/activity tolerance;shortness of breath;strength  -RF     Comment, Safety Issues/Impairments (Mobility) fall precautions, <skin integrity, wound vac RLE  -AB  --     Recorded by [AB] Tiffanie Bell OT [RF] Catie Cooper, NAY[facility]tt  Name 05/01/18 1449             Grooming Assessment/Treatment    Grooming [place] Level supervision;set up  -AB      Grooming Position supported sitting  -AB      Recorded by [AB] Tiffanie Bell OT      Row Name 05/01/18 1435             Sensory    Hearing Status hearing impairment, right  -RF      Recorded by [RF] Catie Cooper [facility]      Row Name 05/01/18 1435             Vision Assessment/Intervention    Visual Impairment/Limitations WFL  -RF      Recorded by [RF] Catie Cooper, [facility]      Row Name 05/01/18 1439             Pain Scale: Numbers Pre/Post-Treatment    Pain Scale: Numbers, Pretreatment 5/10   5/10-at first session, 4/10 at second session  -RF      Pain Scale: Numbers, Post-Treatment 4/10   at first and second session.  -RF      Pain Location - Side Right  -RF      Pain Location --   LE  -RF      Recorded by [RF] Catie Cooper, [facility]      Row Name 05/01/18 1435             Pain Scale: FACES Pre/Post-Treatment    Pain: FACES Scale, Pretreatment 4-->hurts little more  -RF      Pain: FACES Scale, Post-Treatment 4-->hurts little more  -RF      Recorded by [RF] Catie Cooper, [facility]      Row Name 05/01/18 1435             Edema Assessment & Management    Location (Edema) lower extremity, right  -RF      Recorded by [RF] Catie Cooper, [facility]      Row Name 05/01/18 1449             Upper Extremity Seated Therapeutic Exercise    Device, Seated Upper Extremity (Therapeutic Exercise) restorator/arm bike   [id number], 0 resistance  -AB      Exercise Type, Seated Upper Extremity (Therapeutic Exercise) AROM (active range of motion)   BUE CoordEx, G/FMC TherEx/Act, strengthening  -AB      Expected Outcomes, Seated Upper Extremity (Therapeutic Exercise) improve functional tolerance, self-care activity;improve performance, BADLs  -AB      Sets/Reps Detail, Seated Upper Extremity (Therapeutic Exercise) BUE Wrist Rolls X1, pushbox(5lbs): 20X3  -AB      Recorded by [AB] Tiffanie eBll OT      [facility]  Name 05/01/18 1449 05/01/18 1435          Positioning and Restraints    Pre-Treatment Position  -- in bed  -RF     Post Treatment Position bed  -AB  --     In Bed supine;call light within reach;encouraged to call for assist;side rails up x2;RLE elevated  -AB  --     In Wheelchair  -- sitting;with OT  -RF     Recorded by [AB] Tiffanie Bell, TAINA [RF] Catie Cooper PTA       User Key  (r) = Recorded By, (t) = Taken By, (c) = Cosigned By    Initials Name Effective Dates    AB Tiffanie Bell, TAINA 04/03/18 -     RF Catie Cooper PTA 03/07/18 -           Wound 04/23/18 1945 Right lateral calf surgical (Active)   Dressing Appearance intact;other (see comments) 4/30/2018  7:25 PM   Wound Output (mL) 400 4/30/2018  7:25 PM       NPWT (Negative Pressure Wound Therapy) 04/25/18 1600 Right Lateral Calf (Active)   Therapy Setting continuous therapy 4/30/2018  7:25 PM         OT Recommendation and Plan    Anticipated Equipment Needs At Discharge (OT Eval):  (TBD)  Planned Therapy Interventions (OT Eval): activity tolerance training, adaptive equipment training, BADL retraining, ROM/therapeutic exercise, strengthening exercise, transfer/mobility retraining    Plan of Care Review  Plan of Care Reviewed With: patient  Plan of Care Reviewed With: patient  IRF Plan of Care Review: progress ongoing, continue  Progress, Functional Goals: progress more gradual than expected  Outcome Summary: Pt. w/ good tolerance and rest breaks provided. Continue w/ current POC.           OT IRF GOALS     Row Name 04/25/18 1144             Transfer Goal 1 (OT-IRF)    Activity/Assistive Device (Transfer Goal 1, OT-IRF) bed-to-chair/chair-to-bed;toilet  -AB      [place] Level (Transfer Goal 1, OT-IRF) standby assist  -AB      Time Frame (Transfer Goal 1, OT-IRF) long term goal (LTG);by discharge  -AB         Bathing Goal 1 (OT-IRF)    Activity/Device (Bathing Goal 1, OT-IRF) bathing skills, all  -AB      [place] Level  (Bathing Goal 1, OT-IRF) moderate assist (50-74% patient effort);verbal cues required  -AB      Time Frame (Bathing Goal 1, OT-IRF) short term goal (STG);5 - 7 days  -AB         Bathing Goal 2 (OT-IRF)    Activity/Device (Bathing Goal 2, OT-IRF) bathing skills, all  -AB      [place] Level (Bathing Goal 2, OT-IRF) minimum assist (75% or more patient effort);verbal cues required  -AB      Time Frame (Bathing Goal 2, OT-IRF) long term goal (LTG);by discharge  -AB         LB Dressing Goal 1 (OT-IRF)    Activity/Device (LB Dressing Goal 1, OT-IRF) lower body dressing  -AB      [place] (LB Dressing Goal 1, OT-IRF) maximum assist (25-49% patient effort)  -AB      Time Frame (LB Dressing Goal 1, OT-IRF) short term goal (STG);5 - 7 days  -AB         LB Dressing Goal 2 (OT-IRF)    Activity/Device (LB Dressing Goal 2, OT-IRF) lower body dressing  -AB      [place] (LB Dressing Goal 2, OT-IRF) moderate assist (50-74% patient effort)  -AB      Time Frame (LB Dressing Goal 2, OT-IRF) long term goal (LTG);by discharge  -AB         Toileting Goal 1 (OT-IRF)    [place] Level (Toileting Goal 1, OT-IRF) minimum assist (75% or more patient effort)  -AB      Time Frame (Toileting Goal 1, OT-IRF) long term goal (LTG);by discharge  -AB        User Key  (r) = Recorded By, (t) = Taken By, (c) = Cosigned By    Initials Name Provider Type    AB Tiffanie Bell OT Occupational Therapist                 Time Calculation:           Time Calculation- OT     Row Name 05/01/18 1448             Time Calculation- OT    OT Start Time 0915  -AB      OT Stop Time 1045  -AB      OT Time Calculation (min) 90 min  -AB      Total Timed Code Minutes- OT 90 minute(s)  -AB      OT Non-Billable Time (min) 25 min  -AB        User Key  (r) = Recorded By, (t) = Taken By, (c) = Cosigned By    Initials Name Provider Type    AB Tiffanie Bell OT Occupational Therapist             Therapy Charges for Today     Code Description  Service Date Service Provider Modifiers Qty    85005033111 HC OT THERAPEUTIC ACT EA 15 MIN 5/1/2018 Tiffanie Bell OT GO 5    34045029635 HC OT SELF CARE/MGMT/TRAIN EA 15 MIN 5/1/2018 Tiffanie Bell OT GO 1    16634101836 HC OT THER SUPP EA 15 MIN 5/1/2018 Tiffanie Bell, OT GO 1                   Tiffanie Bell OT  5/1/2018

## 2018-05-01 NOTE — PROGRESS NOTES
Inpatient Rehabilitation Functional Measures Assessment    Functional Measures  DERREK Eating:  DERREK Grooming:  DERREK Bathing:  DERREK Upper Body Dressing:  DERREK Lower Body Dressing:  DERREK Toileting:    DERREK Bladder Management  Level of Assistance:  Frequency/Number of Accidents this Shift:    DERREK Bowel Management  Level of Assistance:  Frequency/Number of Accidents this Shift:    DERREK Bed/Chair/Wheelchair Transfer:  DERREK Toilet Transfer:  DERREK Tub/Shower Transfer:    Previously Documented Mode of Locomotion at Discharge:  DERREK Expected Mode of Locomotion at Discharge:  DERREK Walk/Wheelchair:  DERREK Stairs:    DERREK Comprehension:  Both ( auditory and visual) modes of comprehension are used  equally. Patient does not comprehend complex/abstract information in their  primary language without assistance from a helper. Comprehension Score = 4,  Minimal Prompting. Patient comprehends basic daily needs or ideas 75-90% of the  time. Patient requires minimal/occasional prompting. No assistive devices were  required.  DERREK Expression:  Both ( vocal and non-vocal) modes of expression are used  equally. Patient does not express complex/abstract information in their primary  language without a helper. Expression Score = 4, Minimal Prompting. Patient  expresses basic daily needs or ideas 75-90% of the time.  Patient requires  minimal/occasional prompting. No assistive devices were required.  DERREK Social Interaction:  Social Interaction Score = 4, Minimal Direction.  Patient interacts appropriately 75-90% of the time. Patient requires  minimal/occasional direction for the following behavior(s):  DERREK Problem Solving:  Patient does not make appropriate decisions in order to  solve complex problems without assistance from a helper. Problem Solving Score =  4, Minimal Direction. Patient makes appropriate decisions in order to solve  routine problems 75-90% of the time. Patient requires minimal/occasional  direction for the following behavior(s):  DERREK  Memory:  Memory Score = 4, Minimal Prompting. Patient recognizes and  remembers 75-90% of the time. Patient requires minimal/occasional prompting for  memory for the following:    Therapy Mode Minutes  Occupational Therapy:  Physical Therapy:  Speech Language Pathology:    Discharge Functional Goals:    Signed by: Janice Buckley Nurse

## 2018-05-01 NOTE — PROGRESS NOTES
Inpatient Rehabilitation Functional Measures Assessment    Functional Measures  DERREK Eating:  DERREK Grooming:  DERREK Bathing:  DERREK Upper Body Dressing:  DERREK Lower Body Dressing:  DERREK Toileting:  Patient requires total assistance for adjusting clothing before  using a toilet, commode, bedpan, or urinal. Patient requires total assistance  for hygiene. Patient requires total assistance for adjusting clothing after  using a toilet, commode, bedpan, or urinal. Patient performs 0 -  24% of  toileting tasks.  Toileting Score = 1, Total Assistance. Patient requires the  following assistive device(s): Grab bar.    DERREK Bladder Management  Level of Assistance:  Bladder Score = 5.  Patient is supervision/set-up for  bladder management, requiring: Setting out equipment. Emptying equipment.  Patient requires the following assistive device(s):  Urinal.  Frequency/Number of Accidents this Shift:    DERREK Bowel Management  Level of Assistance: Bowel Score = 5.  Patient is supervision/set-up for bowel  management, requiring: Stand by assistance. No assistive devices were required.    Frequency/Number of Accidents this Shift:    DERREK Bed/Chair/Wheelchair Transfer:  Bed/chair/wheelchair Transfer Score = 3.  Patient performs 50-74% of effort and requires moderate assistance (some  lifting)  for transferring to and from the bed/chair/wheelchair, including  assist lifting both legs. Patient requires the following assistive device(s):  Bed rails. Elevated head of bed. Seating system of wheelchair.  DERREK Toilet Transfer:  Toilet Transfer Score = 1.  Patient performs 50-74% of  effort and requires moderate assistance (some lifting) of two or more people for  transferring to and from the toilet/commode. pt safety . Patient requires the  following assistive device(s): Grab bars. Safety frame/over the toilet.  DERREK Tub/Shower Transfer:    Previously Documented Mode of Locomotion at Discharge:  [facility] Expected Mode of Locomotion at Discharge:  [facility]  Walk/Wheelchair:  DERREK Stairs:    DERREK Comprehension:  DERREK Expression:  DERREK Social Interaction:  DERREK Problem Solving:  DERREK Memory:    Therapy Mode Minutes  Occupational Therapy:  Physical Therapy:  Speech Language Pathology:    Discharge Functional Goals:    Signed by: JENI Nobles

## 2018-05-01 NOTE — PROGRESS NOTES
"     LOS: 7 days     Chief Complaint:  Right leg injury    Subjective     Interval History:     Hemoglobin today is 7.4.  It was 7.8 on the 25th.  Stools positive for occult blood ×1 on the 25th.  No grossly bloody stools.  Min assist with bed mobility.  Transfers required min assist.  Ambulating 75 feet with min assist.  Creatinine today is 1.28      Objective     Vital Signs  /56   Pulse 62   Temp 98.5 °F (36.9 °C) (Oral)   Resp 18   Ht 177.8 cm (70\")   Wt 109 kg (240 lb)   SpO2 96%   BMI 34.44 kg/m²   Intake & Output (last day)       04/30 0701 - 05/01 0700 05/01 0701 - 05/02 0700    P.O. 1080 240    Total Intake(mL/kg) 1080 (9.9) 240 (2.2)    Urine (mL/kg/hr) 0 (0)     Stool 0 (0)     Wound 400 (0.2)     Total Output 400      Net +680 +240          Unmeasured Urine Occurrence 6 x 1 x    Unmeasured Stool Occurrence 1 x 1 x            Physical Exam:     General Appearance:    Alert, cooperative, in no acute distress   Head:    Normocephalic, without obvious abnormality, atraumatic   Eyes:            Lids and lashes normal, conjunctivae and sclerae normal, no   icterus, no pallor, corneas clear, PERRLA       Throat:   No oral lesions, no thrush, oral mucosa moist   Neck:   No adenopathy, supple, trachea midline, no thyromegaly, no   carotid bruit, no JVD   Lungs:     Clear to auscultation,respirations regular, even and                  unlabored    Heart:    Regular rhythm and normal rate, normal S1 and S2, no            murmur, no gallop, no rub, no click   Chest Wall:    No abnormalities observed   Abdomen:     Normal bowel sounds, no masses, no organomegaly, soft        non-tender, non-distended, no guarding, no rebound                tenderness   Extremities:   no edema, no cyanosis, no redness.  Right lower extremity with wound VAC                         Results Review:    Lab Results   Component Value Date    WBC 8.92 05/01/2018    HGB 7.4 (L) 05/01/2018    HCT 24.8 (L) 05/01/2018    MCV 87.0 " 05/01/2018     05/01/2018       Lab Results   Component Value Date    GLUCOSE 102 05/01/2018    BUN 24 (H) 05/01/2018    CREATININE 1.28 05/01/2018    EGFRIFNONA 54 (L) 05/01/2018    BCR 18.8 05/01/2018     (L) 05/01/2018    K 4.2 05/01/2018     05/01/2018    CO2 26.2 05/01/2018    CALCIUM 8.1 05/01/2018    ALBUMIN 2.60 (L) 04/25/2018    LABIL2 0.7 (L) 04/25/2018    AST 39 (H) 04/25/2018    ALT 27 04/25/2018     Lab Results   Component Value Date    INR 1.28 (H) 01/03/2018    INR 1.93 (H) 04/25/2017       No results found for: POCGLU       Medication Review:     Current Facility-Administered Medications:   •  acetaminophen (TYLENOL) tablet 650 mg, 650 mg, Oral, Q4H PRN, Samuel Duane Kreis, MD, 650 mg at 04/24/18 2124  •  albuterol (PROVENTIL) nebulizer solution 0.083% 2.5 mg/3mL, 2.5 mg, Nebulization, Q6H PRN, Samuel Duane Kreis, MD  •  alfuzosin (UROXATRAL) 24 hr tablet 10 mg, 10 mg, Oral, Daily, Samuel Duane Kreis, MD, 10 mg at 05/01/18 0916  •  apixaban (ELIQUIS) tablet 5 mg, 5 mg, Oral, Q12H, Bobbi Moses, [facility], 5 mg at 05/01/18 0917  •  bisacodyl (DULCOLAX) suppository 10 mg, 10 mg, Rectal, Daily PRN, Samuel Duane Kreis, MD  •  carboxymethylcellulose (REFRESH PLUS) 0.5 % ophthalmic solution 2 drop, 2 drop, Both Eyes, TID PRN, Samuel Duane Kreis, MD, 2 drop at 04/30/18 2031  •  carvedilol (COREG) tablet 12.5 mg, 12.5 mg, Oral, Q12H, Samuel Duane Kreis, MD, 12.5 mg at 05/01/18 0917  •  cholecalciferol (VITAMIN D3) tablet 1,000 Units, 1,000 Units, Oral, Daily, Samuel Duane Kreis, MD, 1,000 Units at 05/01/18 0917  •  fluticasone (FLONASE) 50 MCG/ACT nasal spray 2 spray, 2 spray, Each Nare, Daily, Samuel Duane Kreis, MD, 2 spray at 04/30/18 0917  •  furosemide (LASIX) tablet 40 mg, 40 mg, Oral, Daily, Samuel Duane Kreis, MD, 40 mg at 05/01/18 0916  •  guaiFENesin (MUCINEX) 12 hr tablet 600 mg, 600 mg, Oral, Q12H, Samuel Duane Kreis, MD, 600 mg at 05/01/18 0916  •  ketotifen (ZADITOR) 0.025 %  ophthalmic solution 1 drop, 1 drop, Both Eyes, BID, Samuel Duane Kreis, MD, 1 drop at 05/01/18 0917  •  levothyroxine (SYNTHROID, LEVOTHROID) tablet 150 mcg, 150 mcg, Oral, Q AM, Samuel Duane Kreis, MD, 150 mcg at 05/01/18 0542  •  magnesium hydroxide (MILK OF MAGNESIA) suspension 2400 mg/10mL 10 mL, 10 mL, Oral, Daily PRN, Samuel Duane Kreis, MD, 10 mL at 04/30/18 0531  •  meclizine (ANTIVERT) tablet 12.5 mg, 12.5 mg, Oral, BID, Samuel Duane Kreis, MD, 12.5 mg at 05/01/18 0917  •  mometasone-formoterol (DULERA 100) inhaler 2 puff, 2 puff, Inhalation, BID, Samuel Duane Kreis, MD, 2 puff at 05/01/18 0729  •  NIFEdipine CC (ADALAT CC) 24 hr tablet 30 mg, 30 mg, Oral, Q24H, Samuel Duane Kreis, MD, 30 mg at 05/01/18 0917  •  ondansetron (ZOFRAN) tablet 4 mg, 4 mg, Oral, Q6H PRN **OR** ondansetron ODT (ZOFRAN-ODT) disintegrating tablet 4 mg, 4 mg, Oral, Q6H PRN **OR** ondansetron (ZOFRAN) injection 4 mg, 4 mg, Intravenous, Q6H PRN, Samuel Duane Kreis, MD  •  oxyCODONE (ROXICODONE) immediate release tablet 5 mg, 5 mg, Oral, Q6H PRN, Samuel Duane Kreis, MD, 5 mg at 04/30/18 2030  •  pantoprazole (PROTONIX) EC tablet 40 mg, 40 mg, Oral, BID AC, Samuel Duane Kreis, MD, 40 mg at 05/01/18 0917  •  polyethylene glycol 3350 powder (packet), 17 g, Oral, Daily PRN, Samuel Duane Kreis, MD  •  potassium chloride (K-DUR,KLOR-CON) ER tablet 10 mEq, 10 mEq, Oral, Daily, Samuel Duane Kreis, MD, 10 mEq at 05/01/18 0917  •  probenecid (BENEMID) tablet 500 mg, 500 mg, Oral, Daily, Samuel Duane Kreis, MD, 500 mg at 05/01/18 0916  •  vitamin B-12 (CYANOCOBALAMIN) tablet 1,000 mcg, 1,000 mcg, Oral, Daily, Samuel Duane Kreis, MD, 1,000 mcg at 05/01/18 0917      Assessment/Plan     Debility  Pulmonary embolism  Right lower extremity DVT with right lower extremity hematoma status post evacuation now with wound VAC  Hyponatremia  COPD  Hypertension  Chronic diastolic congestive heart failure  Chronic kidney disease stage III  Anemia with heme  positive stools       Plan:  PT and OT ongoing     Full dose anticoagulation with Eliquis     He does have hypertension.  Continue nifedipine, carvedilol and monitor closely.      Continue Dulera twice a day    Stool was positive for occult blood but he has no obvious bleeding and hemoglobin is been relatively stable.  Withholding anticoagulation with acute right lower extremity DVT would seem to be riskier than continue with anticoagulation with close monitoring his hemoglobin    Refer to team conference notes      Samuel Duane Kreis, MD  05/01/18  9:45 AM

## 2018-05-01 NOTE — PROGRESS NOTES
Inpatient Rehabilitation Functional Measures Assessment    Functional Measures  DERREK Eating:  Eating Score = 5. Patient is supervision/set-up for eating,  requiring: Stand by assistance. Opening containers. [place] bread. Cutting  meat. Pouring liquids. No assistive devices were required.  DERREK Grooming: Activity was not observed.  DERREK Bathing:  Activity was not observed.  DERREK Upper Body Dressing:  Activity was not observed.  DERREK Lower Body Dressing:  Activity was not observed.  DERREK Toileting:  Patient requires maximal assistance for adjusting clothing  before using a toilet, commode, bedpan, or urinal. Patient requires total  assistance for hygiene. Patient requires maximal assistance for adjusting  clothing after using a toilet, commode, bedpan, or urinal. Patient performs 0 -  24% of toileting tasks.  Toileting Score = 1, Total Assistance. Patient requires  the following assistive device(s): Grab bar.    DERREK Bladder Management  Level of Assistance:  Bladder Score = 5.  Patient is supervision/set-up for  bladder management, requiring: Stand by assistance. Setting out equipment.  Emptying equipment. Patient requires the following assistive device(s):  Urinal.    Frequency/Number of Accidents this Shift:  Bladder accidents this shift:  0 .  Patient has not had an accident, but used a device/medication this shift  requiring: urinal, pt also goes to the bathroom, and is incontient at times .    DERREK Bowel Management  Level of Assistance: Bowel Score = 5.  Patient is supervision/set-up for bowel  management, requiring: Stand by assistance. Setting out equipment. Emptying  equipment. No assistive devices were required.  Frequency/Number of Accidents this Shift: Bowel accidents this shift: 0 .  Patient has not had an accident this shift.    DERREK Bed/Chair/Wheelchair Transfer:  Bed/chair/wheelchair Transfer Score = 1.  Patient performs 75% or more of effort and requires minimal assistance  (incidental help) of two or more  people for transferring to and from the  bed/chair/wheelchair. safety Patient requires the following assistive device(s):  Bed rails. Elevated head of bed. Grab bars. Arm rest.  DERREK Toilet Transfer:  Toilet Transfer Score = 1.  Patient performs 75% or more  of effort and requires minimal assistance (little/incidental help/steadying) of  two or more people for transferring to and from the toilet/commode. safety .  Patient requires the following assistive device(s): Grab bars. Safety frame/over  the toilet.  DERREK Tub/Shower Transfer:  Activity was not observed.    Previously Documented Mode of Locomotion at Discharge:  DERREK Expected Mode of Locomotion at Discharge:  DERREK Walk/Wheelchair:  DERREK Stairs:    DERREK Comprehension:  DERREK Expression:  DERREK Social Interaction:  DERREK Problem Solving:  DERREK Memory:    Therapy Mode Minutes  Occupational Therapy:  Physical Therapy:  Speech Language Pathology:    Discharge Functional Goals:    Signed by: Valeria Quarles Nurse

## 2018-05-01 NOTE — SIGNIFICANT NOTE
05/01/18 1400   Plan   Plan Team conference held today.   Spoke to pt and jason [phone] about plans for discharge on 5-8-18, how he is progressing in therapy, and recommendations for home health PT, OT, aide, nursing, wound vac, rolling walker and bedside commode.  [facility] is preferred and no preference for DME provider.  Pt will go home with jason at discharge.  Pt will have 24 hour assistance at home.  Spoke to Soheila, wound care nurse, about plans for pt to be discharged home on 5-8-18; she will make arrangements for home wound vac with [facility].  Informed her pt prefers [facility].  Will follow.   Patient/Family in Agreement with Plan yes

## 2018-05-02 PROCEDURE — 97535 SELF CARE MNGMENT TRAINING: CPT

## 2018-05-02 PROCEDURE — 94799 UNLISTED PULMONARY SVC/PX: CPT

## 2018-05-02 PROCEDURE — 97530 THERAPEUTIC ACTIVITIES: CPT

## 2018-05-02 PROCEDURE — 97116 GAIT TRAINING THERAPY: CPT

## 2018-05-02 PROCEDURE — 97110 THERAPEUTIC EXERCISES: CPT

## 2018-05-02 RX ADMIN — PANTOPRAZOLE SODIUM 40 MG: 40 TABLET, DELAYED RELEASE ORAL at 09:11

## 2018-05-02 RX ADMIN — Medication 1000 MCG: at 09:11

## 2018-05-02 RX ADMIN — PROBENECID 500 MG: 500 TABLET, FILM COATED ORAL at 09:15

## 2018-05-02 RX ADMIN — OXYCODONE HYDROCHLORIDE 5 MG: 5 TABLET ORAL at 20:45

## 2018-05-02 RX ADMIN — FUROSEMIDE 40 MG: 40 TABLET ORAL at 09:11

## 2018-05-02 RX ADMIN — ALFUZOSIN HYDROCHLORIDE 10 MG: 10 TABLET, EXTENDED RELEASE ORAL at 09:13

## 2018-05-02 RX ADMIN — PANTOPRAZOLE SODIUM 40 MG: 40 TABLET, DELAYED RELEASE ORAL at 17:35

## 2018-05-02 RX ADMIN — KETOTIFEN FUMARATE 1 DROP: 0.35 SOLUTION/ DROPS OPHTHALMIC at 09:16

## 2018-05-02 RX ADMIN — CHOLECALCIFEROL TAB 10 MCG (400 UNIT) 1000 UNITS: 10 TAB at 09:11

## 2018-05-02 RX ADMIN — FLUTICASONE PROPIONATE 2 SPRAY: 50 SPRAY, METERED NASAL at 09:12

## 2018-05-02 RX ADMIN — NIFEDIPINE 30 MG: 30 TABLET, EXTENDED RELEASE ORAL at 09:11

## 2018-05-02 RX ADMIN — CARVEDILOL 12.5 MG: 6.25 TABLET, FILM COATED ORAL at 20:46

## 2018-05-02 RX ADMIN — APIXABAN 5 MG: 5 TABLET, FILM COATED ORAL at 20:46

## 2018-05-02 RX ADMIN — APIXABAN 5 MG: 5 TABLET, FILM COATED ORAL at 09:11

## 2018-05-02 RX ADMIN — CARBOXYMETHYLCELLULOSE SODIUM 2 DROP: 5 SOLUTION/ DROPS OPHTHALMIC at 20:46

## 2018-05-02 RX ADMIN — CARVEDILOL 12.5 MG: 6.25 TABLET, FILM COATED ORAL at 09:11

## 2018-05-02 RX ADMIN — GUAIFENESIN 600 MG: 600 TABLET, EXTENDED RELEASE ORAL at 09:12

## 2018-05-02 RX ADMIN — OXYCODONE HYDROCHLORIDE 5 MG: 5 TABLET ORAL at 11:55

## 2018-05-02 RX ADMIN — LEVOTHYROXINE SODIUM 150 MCG: 150 TABLET ORAL at 05:48

## 2018-05-02 RX ADMIN — CARBOXYMETHYLCELLULOSE SODIUM 2 DROP: 5 SOLUTION/ DROPS OPHTHALMIC at 09:17

## 2018-05-02 RX ADMIN — KETOTIFEN FUMARATE 1 DROP: 0.35 SOLUTION/ DROPS OPHTHALMIC at 20:46

## 2018-05-02 RX ADMIN — GUAIFENESIN 600 MG: 600 TABLET, EXTENDED RELEASE ORAL at 20:46

## 2018-05-02 RX ADMIN — POTASSIUM CHLORIDE 10 MEQ: 750 TABLET, FILM COATED, EXTENDED RELEASE ORAL at 09:11

## 2018-05-02 RX ADMIN — MECLIZINE HCL 12.5 MG: 12.5 TABLET ORAL at 09:13

## 2018-05-02 RX ADMIN — MECLIZINE HCL 12.5 MG: 12.5 TABLET ORAL at 20:46

## 2018-05-02 NOTE — PROGRESS NOTES
Inpatient Rehabilitation Functional Measures Assessment    Functional Measures  DERREK Eating:  DERREK Grooming:  DERREK Bathing:  DERREK Upper Body Dressing:  DERREK Lower Body Dressing:  DERREK Toileting:    DERREK Bladder Management  Level of Assistance:  Frequency/Number of Accidents this Shift:    DERREK Bowel Management  Level of Assistance:  Frequency/Number of Accidents this Shift:    DERREK Bed/Chair/Wheelchair Transfer:  DERREK Toilet Transfer:  DERREK Tub/Shower Transfer:    Previously Documented Mode of Locomotion at Discharge:  DERREK Expected Mode of Locomotion at Discharge:  DERREK Walk/Wheelchair:  DERREK Stairs:    DERREK Comprehension:  Auditory comprehension is the usual mode. Comprehension  Score = 7, Independent.  Patient comprehends complex/abstract information in  their primary language.  Patient is completely independent for auditory  comprehension.  There are no activity limitations.  DERREK Expression:  Vocal expression is the usual mode. Expression Score = 6,  Modified Independent.  Patient expresses complex/abstract information in their  primary language, requiring:  DERREK Social Interaction:  Social Interaction Score = 6, Modified Independent.  Patient is modified independent for social interaction, requiring:  DERREK Problem Solving:  Problem Solving Score = 6, Modified [place].  Patient  makes appropriate decisions in order to solve complex problems, but requires  extra time.  DERREK Memory:  Memory Score = 6, Modified [place].  Patient is modified  independent for memory, having only mild difficulty and using self-initiated or  environmental cues to remember.    Therapy Mode Minutes  Occupational Therapy:  Physical Therapy:  Speech Language Pathology:    Discharge Functional Goals:    Signed by: Nurse Shelli

## 2018-05-02 NOTE — NURSING NOTE
NPWT dressing change completed  Patient tolerated well     05/02/18 1237   Wound 04/23/18 1945 Right lateral calf surgical   Date first assessed/Time first assessed: 04/23/18 1945   Present On Admission : yes;picture taken  Side: Right  Orientation: lateral  Location: calf  Type: surgical   Dressing Appearance dry;intact   Base red/granulating   Red (%), Wound Tissue Color 99.5   Periwound intact;edematous   Edges irregular;rolled/closed   Drainage Characteristics/Odor bleeding controlled   NPWT (Negative Pressure Wound Therapy) 04/25/18 1600 Right Lateral Calf   Placement Date/Time: 04/25/18 1600   Location: Right Lateral Calf   Therapy Setting continuous therapy   Dressing foam, black   Pressure Setting 125 mmHg   Sponges Inserted 4   Sponges Removed 4   Finger sweep complete Yes

## 2018-05-02 NOTE — PROGRESS NOTES
Inpatient Rehabilitation Functional Measures Assessment    Functional Measures  DERREK Eating:  Eating Score = 5. Patient is supervision/set-up for eating,  requiring: Opening containers. No assistive devices were required.  DERREK Grooming: Activity was not observed.  DERREK Bathing:  Activity was not observed.  DERREK Upper Body Dressing:  Activity was not observed.  DERREK Lower Body Dressing:  Activity was not observed.  DERREK Toileting:  Patient requires moderate assistance for adjusting clothing  before using a toilet, commode, bedpan, or urinal. Patient requires moderate  assistance for hygiene. Patient requires moderate assistance for adjusting  clothing after using a toilet, commode, bedpan, or urinal. Patient performs 0 -  24% of toileting tasks.  Toileting Score = 1, Total Assistance. No assistive  devices were required.    DERREK Bladder Management  Level of Assistance:  Bladder Score = 3. Patient performs 50-74% of tasks and  requires moderate assistance for bladder management. [place] provides some assist  to position the urinal, holds it in place, or empties the urinal.  Frequency/Number of Accidents this Shift:  Bladder accidents this shift:  0 .  Patient has not had an accident, but used a device/medication this shift  requiring: urinal .    DERREK Bowel Management  Level of Assistance: Activity was not observed.  Frequency/Number of Accidents this Shift: Bowel accidents this shift: 0 .  Patient has not had an accident this shift.    DERREK Bed/Chair/Wheelchair Transfer:  Bed/chair/wheelchair Transfer Score = 3.  Patient performs 50-74% of effort and requires moderate assistance (some  lifting) for transferring to and from the bed/chair/wheelchair. Patient requires  the following assistive device(s): Elevated head of bed. Elevated bed/surface.    DERREK Toilet Transfer:  Toilet Transfer Score = 3.  Patient performs 50-74% of  effort and requires moderate assistance (some lifting) for transferring to and  from the toilet/commode.  Patient requires the following assistive device(s):  Grab bars. Safety frame/over the toilet. Specialized seat.  DERREK Tub/Shower Transfer:  Activity was not observed.    Previously Documented Mode of Locomotion at Discharge:  [facility] Expected Mode of Locomotion at Discharge:  [facility] Walk/Wheelchair:  DERREK Stairs:    DERREK Comprehension:  DERREK Expression:  DERREK Social Interaction:  [facility] Problem Solving:  [facility] Memory:    Therapy Mode Minutes  Occupational Therapy:  Physical Therapy:  Speech Language Pathology:    Discharge Functional Goals:    Signed by: JENI Noel

## 2018-05-02 NOTE — THERAPY PROGRESS REPORT/RE-CERT
Inpatient Rehabilitation - Physical Therapy Progress Note   Oscar     Patient Name: Carlos Cee  : 1934  MRN: [id number]    Today's Date: 2018                 Admit Date: 2018      Visit Dx:    No diagnosis found.    Patient Active Problem List   Diagnosis   • Debility       Therapy Treatment          IRF Treatment Summary     Row Name 18 1409             Evaluation/Treatment Time and Intent    Document Type therapy note (daily note);progress note/recertification  -AB      Mode of Treatment occupational therapy  -AB      Patient/Family Observations Pt. agreeable to therapy, no complaints  -AB      Recorded by [AB] Tiffanie Bell OT      Row Name 18 1409             Cognition/Psychosocial- PT/OT    Affect/Mental Status (Cognitive) WFL  -AB      Follows Commands (Cognition) WFL  -AB      Recorded by [AB] Tiffanie Bell OT      Row Name 18 1409             Transfer Assessment/Treatment    Bed-Chair [place] (Transfers) minimum assist (75% patient effort);verbal cues  -AB      Assistive Device (Bed-Chair Transfers) walker, front-wheeled;wheelchair  -AB      Sit-Stand [place] (Transfers) minimum assist (75% patient effort);verbal cues  -AB      Stand-Sit [place] (Transfers) minimum assist (75% patient effort);verbal cues  -AB      Recorded by [AB] Tiffanie Bell OT      Row Name 18 1409             Sit-Stand Transfer    Assistive Device (Sit-Stand Transfers) walker, front-wheeled  -AB      Recorded by [AB] Tiffanie Bell OT      Row Name 18 1409             Stand-Sit Transfer    Assistive Device (Stand-Sit Transfers) walker, front-wheeled  -AB      Recorded by [AB] Tiffanie Bell OT      Row Name 18 1409             Safety Issues, Functional Mobility    Comment, Safety Issues/Impairments (Mobility) fall precautions, <skin integrity, wound vac RLE  -AB      Recorded by [AB] Tiffanie Bell, OT       Row Name 05/02/18 1409             Bathing Assessment/Treatment    Bathing [place] Level moderate assist (50% patient effort);verbal cues  -AB      Assistive Device (Bathing) grab bar/tub rail  -AB      Bathing Position unsupported sitting;supported standing  -AB      Recorded by [AB] Tiffanie Bell, OT      Row Name 05/02/18 1409             Upper Body Dressing Assessment/Treatment    Upper Body Dressing Task set up assistance  -AB      Upper Body Dressing Position unsupported sitting  -AB      Recorded by [AB] Tiffanie Bell, OT      Row Name 05/02/18 1409             Lower Body Dressing Assessment/Treatment    Lower Body Dressing [place] Level maximum assist (25% patient effort);verbal cues  -AB      Lower Body Dressing Position unsupported sitting;supported standing  -AB      Recorded by [AB] Tiffanie Bell, OT      Row Name 05/02/18 1409             Grooming Assessment/Treatment    Grooming [place] Level supervision;set up  -AB      Grooming Position supported sitting;sink side  -AB      Recorded by [AB] Tiffanie Bell, OT      Row Name 05/02/18 1409             Toileting Assessment/Treatment    Toileting [place] Level moderate assist (50% patient effort);verbal cues  -AB      Assistive Device Use (Toileting) grab bar/safety frame;urinal  -AB      Toileting Position supported standing  -AB      Recorded by [AB] Tiffanie Bell, OT      Row Name 05/02/18 1409             Upper Extremity Seated Therapeutic Exercise    Device, Seated Upper Extremity (Therapeutic Exercise) restorator/arm bike   [id number], min resistance  -AB      Exercise Type, Seated Upper Extremity (Therapeutic Exercise) AROM (active range of motion)   BUE CoordEx, G/FMC TherEx/Act, strengthening  -AB      Expected Outcomes, Seated Upper Extremity (Therapeutic Exercise) improve functional tolerance, self-care activity;improve performance, BADLs  -AB      Sets/Reps Detail, Seated Upper  Extremity (Therapeutic Exercise) BUE Wrist Rolls X2  -AB      Recorded by [AB] Tiffanie Bell OT      Row Name 05/02/18 1409             Positioning and Restraints    Post Treatment Position wheelchair  -AB      In Bed supine;call light within reach;encouraged to call for assist;side rails up x2;RLE elevated   in PM  -AB      In Wheelchair sitting;with PT;legs elevated   in AM  -AB      Recorded by [AB] Tiffanie Bell OT        User Key  (r) = Recorded By, (t) = Taken By, (c) = Cosigned By    Initials Name Effective Dates    AB Tiffanie Bell OT 04/03/18 -           Wound 04/23/18 1945 Right lateral calf surgical (Active)   Dressing Appearance dry;intact 5/2/2018 12:37 PM   Base red/granulating 5/2/2018 12:37 PM   Red (%), Wound Tissue Color 99.5 5/2/2018 12:37 PM   Periwound intact;edematous 5/2/2018 12:37 PM   Edges irregular;rolled/closed 5/2/2018 12:37 PM   Drainage Characteristics/Odor bleeding controlled 5/2/2018 12:37 PM   Wound Output (mL) 225 5/2/2018 10:30 AM       NPWT (Negative Pressure Wound Therapy) 04/25/18 1600 Right Lateral Calf (Active)   Therapy Setting continuous therapy 5/2/2018 12:37 PM   Dressing foam, black 5/2/2018 12:37 PM   Pressure Setting 125 mmHg 5/2/2018 12:37 PM   Sponges Inserted 4 5/2/2018 12:37 PM   Sponges Removed 4 5/2/2018 12:37 PM   Finger sweep complete Yes 5/2/2018 12:37 PM       Physical Therapy Education     Title: PT OT SLP Therapies (Done)     Topic: Physical Therapy (Done)     Point: Mobility training (Done)    Learning Progress Summary     Learner Status Readiness Method Response Comment Documented by    Patient Done Acceptance ELSIE SULLIVAN NR  AG 05/02/18 1753     Done Acceptance E VU  RF 05/01/18 1437     Done Acceptance E VU  RF 04/30/18 1611     Done Acceptance E LANCE CIFUENTES  BE 04/28/18 1340     Done Acceptance E VU  KB 04/27/18 1710     Done Acceptance ELSIE SULLIVAN NR  AG 04/26/18 1635     Done Acceptance ELSIE SULLIVAN NR  AG 04/25/18 1731          Point: Home  exercise program (Done)    Learning Progress Summary     Learner Status Readiness Method Response Comment Documented by    Patient Done Acceptance E,D VU,NR  AG 05/02/18 1753     Done Acceptance E VU  RF 05/01/18 1437     Done Acceptance E VU  RF 04/30/18 1611     Done Acceptance E VU,NR  BE 04/28/18 1340     Done Acceptance E VU   04/27/18 1710     Done Acceptance E,D VU,NR   04/26/18 1635     Done Acceptance E,D VU,NR   04/25/18 1731          Point: Body mechanics (Done)    Learning Progress Summary     Learner Status Readiness Method Response Comment Documented by    Patient Done Acceptance E,D VU,NR  AG 05/02/18 1753     Done Acceptance E VU   05/01/18 1437     Done Acceptance E VU   04/30/18 1611     Done Acceptance E VU,NR  BE 04/28/18 1340     Done Acceptance E VU   04/27/18 1710     Done Acceptance E,D VU,NR   04/26/18 1635     Done Acceptance E,D VU,NR   04/25/18 1731          Point: Precautions (Done)    Learning Progress Summary     Learner Status Readiness Method Response Comment Documented by    Patient Done Acceptance E,D VU,NR   05/02/18 1753     Done Acceptance E VU   05/01/18 1437     Done Acceptance E VU   04/30/18 1611     Done Acceptance E VU,NR   04/28/18 1340     Done Acceptance E VU   04/27/18 1710     Done Acceptance E,D VU,NR   04/26/18 1635     Done Acceptance E,D VU,NR   04/25/18 1731                      User Key     Initials Effective Dates Name Provider Type Discipline     04/03/18 -  Agnieszka Cool, PT Physical Therapist PT     04/03/18 -  Lilliana Hyde, PT Physical Therapist PT     03/07/18 -  Catie Cooper, PTA Physical Therapy Assistant PT     12/20/17 -  Kay Pena, PTA Physical Therapy Assistant PT                  PT Recommendation and Plan    Anticipated Equipment Needs at Discharge (PT Eval): front wheeled walker  Planned Therapy Interventions (PT Eval): balance training, bed mobility training, gait training, home exercise  program, manual therapy techniques, neuromuscular re-education, patient/family education, postural re-education, strengthening, transfer training, wheelchair management/propulsion training  Frequency of Treatment (PT Eval): 5 times per week    Plan of Care Reviewed With: patient  IRF Plan of Care Review: progress ongoing, continue  Progress, Functional Goals: progress more gradual than expected  Outcome Summary: progress limited by decr participation/ cooperation by pt. Continue POC.            PT IRF GOALS     Row Name 05/02/18 1233             Bed Mobility Goal 1 (PT-IRF)    [place] Level (Bed Mobility Goal 1, PT-IRF) contact guard assist  -AG      Progress/Outcomes (Bed Mobility Goal 1, PT-IRF) continuing progress toward goal  -AG         Bed Mobility Goal 2 (PT-IRF)    [place] Level (Bed Mobility Goal 2, PT-IRF) contact guard assist  -AG      Progress/Outcomes (Bed Mobility Goal 2, PT-IRF) continuing progress toward goal  -AG         Transfer Goal 1 (PT-IRF)    [place] Level (Transfer Goal 1, PT-IRF) contact guard assist  -AG      Progress/Outcomes (Transfer Goal 1, PT-IRF) goal met  -AG         Transfer Goal 2 (PT-IRF)    [place] Level (Transfer Goal 2, PT-IRF) contact guard assist  -AG      Progress/Outcomes (Transfer Goal 2, PT-IRF) continuing progress toward goal  -AG         Gait/Walking Locomotion Goal 1 (PT-IRF)    Gait/Walking Locomotion Distance Goal 1 (PT-IRF) 80  -AG      [place] Level (Gait/Walking Locomotion Goal 1, PT-IRF) contact guard assist  -AG      Progress/Outcomes (Gait/Walking Locomotion Goal 1, PT-IRF) goal met  -AG         Gait/Walking Locomotion Goal 2 (PT-IRF)    Progress/Outcomes (Gait/Walking Locomotion Goal 2, PT-IRF) continuing progress toward goal  -AG         Wheelchair Locomotion Goal 1 (PT-IRF)    Progress/Outcomes (Wheelchair Locomotion Goal 1, PT-IRF) continuing progress toward goal  -AG         Wheelchair Locomotion Goal 2 (PT-IRF)     Progress/Outcomes (Wheelchair Locomotion Goal 2, PT-IRF) continuing progress toward goal  -AG        User Key  (r) = Recorded By, (t) = Taken By, (c) = Cosigned By    Initials Name Provider Type    AG Agnieszka Cool PT Physical Therapist                   Time Calculation:           PT Charges     Row Name 05/02/18 1754             Time Calculation    Start Time 0920  -AG      Stop Time 1005  -AG      Time Calculation (min) 45 min  -AG      PT Received On 05/02/18  -AG      PT - Next Appointment 05/03/18  -      PT Goal Re-Cert Due Date 05/09/18  -AG        User Key  (r) = Recorded By, (t) = Taken By, (c) = Cosigned By    Initials Name Provider Type    AG Agnieszka Cool PT Physical Therapist            Therapy Charges for Today     Code Description Service Date Service Provider Modifiers Qty    83843560248 HC GAIT TRAINING EA 15 MIN 5/2/2018 Agnieszka Cool, PT GP 1    31294230199 HC PT THER PROC EA 15 MIN 5/2/2018 Agnieszka Cool, PT GP 2    84577929003 HC PT THER SUPP EA 15 MIN 5/2/2018 Agnieszka Cool, PT GP 1                   Agnieszka Cool PT  5/2/2018

## 2018-05-02 NOTE — PLAN OF CARE
Problem: Patient Care Overview  Goal: Plan of Care Review   05/02/18 1753   Patient Care Overview   IRF Plan of Care Review progress ongoing, continue   Progress, Functional Goals progress more gradual than expected   Coping/Psychosocial   Plan of Care Reviewed With patient   OTHER   Outcome Summary progress limited by decr participation/ cooperation by pt. Continue POC.

## 2018-05-02 NOTE — PROGRESS NOTES
Inpatient Rehabilitation Functional Measures Assessment    Functional Measures  DERREK Eating:  DERREK Grooming:  DERREK Bathing:  DERREK Upper Body Dressing:  DERREK Lower Body Dressing:  DERREK Toileting:    DERREK Bladder Management  Level of Assistance:  Frequency/Number of Accidents this Shift:    DERREK Bowel Management  Level of Assistance:  Frequency/Number of Accidents this Shift:    DERREK Bed/Chair/Wheelchair Transfer:  Bed/chair/wheelchair Transfer Score = 4.  Patient performs 75% or more of effort and minimal assistance (little/incidental  help/lifting of one limb/steadying) for transferring to and from the  bed/chair/wheelchair, requiring: Patient requires the following assistive  device(s): Walker.  DERREK Toilet Transfer:  Toilet Transfer Score = 4.  Patient performs 75% or more  of effort and minimal assistance (little/incidental help/steadying) for  transferring to and from the toilet/commode, requiring: Patient requires the  following assistive device(s): Safety frame/over the toilet. Grab bars.  DERREK Tub/Shower Transfer:  Activity was not observed.    Previously Documented Mode of Locomotion at Discharge: Both  [facility] Expected Mode of Locomotion at Discharge: No change to the current  documented expected, most frequently used mode of locomotion at discharge  DERREK Walk/Wheelchair:  WHEELCHAIR OBSERVATION   Wheelchair did not occur.    WALK OBSERVATION   Walk Distance Scale = 2.  Distance walked is 50 -149 feet. Walk Score = 2.  Patient performs 75% or more of effort and requires minimal assistance.  Incidental assistance, contact guard or steadying was provided. Patient walked a  distance of 80 feet. Patient requires the following assistive device(s): Rolling  walker.  DERREK Stairs:  Stairs did not occur.    DERREK Comprehension:  DERREK Expression:  DERREK Social Interaction:  DERREK Problem Solving:  DERREK Memory:    Therapy Mode Minutes  Occupational Therapy:  Physical Therapy: Individual: 45 minutes.  Speech Language Pathology:    Discharge  Functional Goals:    Signed by: Agnieszka Cool, Physical Therapist

## 2018-05-02 NOTE — PLAN OF CARE
Problem: Patient Care Overview  Goal: Plan of Care Review  Outcome: Ongoing (interventions implemented as appropriate)   05/02/18 1240   Patient Care Overview   IRF Plan of Care Review progress ongoing, continue   Progress, Functional Goals demonstrating adequate progress   Coping/Psychosocial   Plan of Care Reviewed With patient   OTHER   Outcome Summary Pt. demonstrated improvements today w/ self care tasks and fxl transfers. He continues to require assistance w/ LB dressing d/t RLE swelling/wound vac. He will likely benefit from continued skilled OT services. Continue w/ current POC.

## 2018-05-02 NOTE — PLAN OF CARE
Problem: Patient Care Overview  Goal: Plan of Care Review  Outcome: Ongoing (interventions implemented as appropriate)      Problem: Fall Risk (Adult)  Goal: Absence of Fall  Outcome: Ongoing (interventions implemented as appropriate)      Problem: Skin Injury Risk (Adult)  Goal: Skin Health and Integrity  Outcome: Ongoing (interventions implemented as appropriate)      Problem: Functional Mobility Impairment (IRF) (Adult)  Goal: Optimal/Safe Level of [place] with Mobility  Outcome: Ongoing (interventions implemented as appropriate)      Problem: BADL Skill Impairment (IRF) (Adult)  Goal: Optimal Functional [place] for BADLs (Performing or Directing)  Outcome: Ongoing (interventions implemented as appropriate)      Problem: Wound (Includes Pressure Injury) (Adult)  Goal: Signs and Symptoms of Listed Potential Problems Will be Absent, Minimized or Managed (Wound)  Outcome: Ongoing (interventions implemented as appropriate)

## 2018-05-02 NOTE — PROGRESS NOTES
"     LOS: 8 days     Chief Complaint:  Right leg injury    Subjective     Interval History:     Denies cough, fevers or chills. No SOA. Denies palpitations. SBP < 140. No orthostatic symptoms.  No rash.        Objective     Vital Signs  /55   Pulse 60   Temp 98.1 °F (36.7 °C) (Oral)   Resp 18   Ht 177.8 cm (70\")   Wt 109 kg (240 lb)   SpO2 97%   BMI 34.44 kg/m²   Intake & Output (last day)       05/01 0701 - 05/02 0700 05/02 0701 - 05/03 0700    P.O. 1080 240    Total Intake(mL/kg) 1080 (9.9) 240 (2.2)    Urine (mL/kg/hr) 0 (0) 0 (0)    Stool      Wound 300 (0.1) 225 (0.3)    Total Output 300 225    Net +780 +15          Unmeasured Urine Occurrence 7 x 1 x    Unmeasured Stool Occurrence 1 x             Physical Exam:     General Appearance:    Alert, cooperative, in no acute distress   Head:    Normocephalic, without obvious abnormality, atraumatic   Eyes:            Lids and lashes normal, conjunctivae and sclerae normal, no   icterus, no pallor, corneas clear, PERRLA       Throat:   No oral lesions, no thrush, oral mucosa moist   Neck:   No adenopathy, supple, trachea midline, no thyromegaly, no   carotid bruit, no JVD   Lungs:     Clear to auscultation,respirations regular, even and                  unlabored    Heart:    Regular rhythm and normal rate, normal S1 and S2, no            murmur, no gallop, no rub, no click   Chest Wall:    No abnormalities observed   Abdomen:     Normal bowel sounds, no masses, no organomegaly, soft        non-tender, non-distended, no guarding, no rebound                tenderness   Extremities:   no edema, no cyanosis, no redness.  Right lower extremity with wound VAC                         Results Review:    Lab Results   Component Value Date    WBC 8.92 05/01/2018    HGB 7.4 (L) 05/01/2018    HCT 24.8 (L) 05/01/2018    MCV 87.0 05/01/2018     05/01/2018       Lab Results   Component Value Date    GLUCOSE 102 05/01/2018    BUN 24 (H) 05/01/2018    CREATININE " 1.28 05/01/2018    EGFRIFNONA 54 (L) 05/01/2018    BCR 18.8 05/01/2018     (L) 05/01/2018    K 4.2 05/01/2018     05/01/2018    CO2 26.2 05/01/2018    CALCIUM 8.1 05/01/2018    ALBUMIN 2.60 (L) 04/25/2018    LABIL2 0.7 (L) 04/25/2018    AST 39 (H) 04/25/2018    ALT 27 04/25/2018     Lab Results   Component Value Date    INR 1.28 (H) 01/03/2018    INR 1.93 (H) 04/25/2017       No results found for: POCGLU       Medication Review:     Current Facility-Administered Medications:   •  acetaminophen (TYLENOL) tablet 650 mg, 650 mg, Oral, Q4H PRN, Samuel Duane Kreis, MD, 650 mg at 04/24/18 2124  •  albuterol (PROVENTIL) nebulizer solution 0.083% 2.5 mg/3mL, 2.5 mg, Nebulization, Q6H PRN, Samuel Duane Kreis, MD  •  alfuzosin (UROXATRAL) 24 hr tablet 10 mg, 10 mg, Oral, Daily, Samuel Duane Kreis, MD, 10 mg at 05/02/18 0913  •  apixaban (ELIQUIS) tablet 5 mg, 5 mg, Oral, Q12H, Bobbi Moses, [facility], 5 mg at 05/02/18 0911  •  bisacodyl (DULCOLAX) suppository 10 mg, 10 mg, Rectal, Daily PRN, Samuel Duane Kreis, MD  •  carboxymethylcellulose (REFRESH PLUS) 0.5 % ophthalmic solution 2 drop, 2 drop, Both Eyes, TID PRN, Samuel Duane Kreis, MD, 2 drop at 05/02/18 0917  •  carvedilol (COREG) tablet 12.5 mg, 12.5 mg, Oral, Q12H, Samuel Duane Kreis, MD, 12.5 mg at 05/02/18 0911  •  cholecalciferol (VITAMIN D3) tablet 1,000 Units, 1,000 Units, Oral, Daily, Samuel Duane Kreis, MD, 1,000 Units at 05/02/18 0911  •  fluticasone (FLONASE) 50 MCG/ACT nasal spray 2 spray, 2 spray, Each Nare, Daily, Samuel Duane Kreis, MD, 2 spray at 05/02/18 0912  •  furosemide (LASIX) tablet 40 mg, 40 mg, Oral, Daily, Samuel Duane Kreis, MD, 40 mg at 05/02/18 0911  •  guaiFENesin (MUCINEX) 12 hr tablet 600 mg, 600 mg, Oral, Q12H, Samuel Duane Kreis, MD, 600 mg at 05/02/18 0912  •  ketotifen (ZADITOR) 0.025 % ophthalmic solution 1 drop, 1 drop, Both Eyes, BID, Samuel Duane Kreis, MD, 1 drop at 05/02/18 0916  •  levothyroxine (SYNTHROID, LEVOTHROID)  tablet 150 mcg, 150 mcg, Oral, Q AM, Samuel Duane Kreis, MD, 150 mcg at 05/02/18 0548  •  magnesium hydroxide (MILK OF MAGNESIA) suspension 2400 mg/10mL 10 mL, 10 mL, Oral, Daily PRN, Samuel Duane Kreis, MD, 10 mL at 04/30/18 0531  •  meclizine (ANTIVERT) tablet 12.5 mg, 12.5 mg, Oral, BID, Samuel Duane Kreis, MD, 12.5 mg at 05/02/18 0913  •  mometasone-formoterol (DULERA 100) inhaler 2 puff, 2 puff, Inhalation, BID, Samuel Duane Kreis, MD, 2 puff at 05/02/18 0746  •  NIFEdipine CC (ADALAT CC) 24 hr tablet 30 mg, 30 mg, Oral, Q24H, Samuel Duane Kreis, MD, 30 mg at 05/02/18 0911  •  ondansetron (ZOFRAN) tablet 4 mg, 4 mg, Oral, Q6H PRN **OR** ondansetron ODT (ZOFRAN-ODT) disintegrating tablet 4 mg, 4 mg, Oral, Q6H PRN **OR** ondansetron (ZOFRAN) injection 4 mg, 4 mg, Intravenous, Q6H PRN, Samuel Duane Kreis, MD  •  oxyCODONE (ROXICODONE) immediate release tablet 5 mg, 5 mg, Oral, Q6H PRN, Samuel Duane Kreis, MD, 5 mg at 05/02/18 1155  •  pantoprazole (PROTONIX) EC tablet 40 mg, 40 mg, Oral, BID AC, Samuel Duane Kreis, MD, 40 mg at 05/02/18 0911  •  polyethylene glycol 3350 powder (packet), 17 g, Oral, Daily PRN, Samuel Duane Kreis, MD  •  potassium chloride (K-DUR,KLOR-CON) ER tablet 10 mEq, 10 mEq, Oral, Daily, Samuel Duane Kreis, MD, 10 mEq at 05/02/18 0911  •  probenecid (BENEMID) tablet 500 mg, 500 mg, Oral, Daily, Samuel Duane Kreis, MD, 500 mg at 05/02/18 0915  •  vitamin B-12 (CYANOCOBALAMIN) tablet 1,000 mcg, 1,000 mcg, Oral, Daily, Samuel Duane Kreis, MD, 1,000 mcg at 05/02/18 0911      Assessment/Plan     Debility  Pulmonary embolism  Right lower extremity DVT with right lower extremity hematoma status post evacuation now with wound VAC  Hyponatremia  COPD  Hypertension  Chronic diastolic congestive heart failure  Chronic kidney disease stage III  Anemia with heme positive stools       Plan:  PT and OT ongoing     Full dose anticoagulation with Eliquis     He does have hypertension.  Continue nifedipine,  carvedilol and monitor closely.      Continue Dulera twice a day    Stool was positive for occult blood but he has no obvious bleeding and hemoglobin is been relatively stable.  Withholding anticoagulation with acute right lower extremity DVT would seem to be riskier than continue with anticoagulation with close monitoring his hemoglobin        Samuel Duane Kreis, MD  05/02/18  2:00 PM

## 2018-05-02 NOTE — PROGRESS NOTES
Inpatient Rehabilitation Functional Measures Assessment    Functional Measures  DERREK Eating:  DERREK Grooming: Grooming Score = 5. Patient is supervision/set-up for grooming,  requiring: Setting out grooming equipment. No assistive devices were required.  DERREK Bathing:  Patient bathed in bed. Patient requires no physical assistance for  washing, rinsing, and drying the right arm. Patient requires no physical  assistance for washing, rinsing, and drying the left arm. Patient requires no  physical assistance for washing, rinsing, and drying the chest. Patient requires  no physical assistance for washing, rinsing, and drying the abdomen. Patient  requires no physical assistance for washing, rinsing, and drying the perineal  area. Patient requires total assistance for washing, rinsing, or drying the  buttocks. Patient requires no physical assistance for washing, rinsing, and  drying the right upper leg. Patient requires no physical assistance for washing,  rinsing, and drying the left upper leg. Patient requires minimal assistance for  washing, rinsing, or drying the right lower leg, including the foot. Patient  requires minimal assistance for washing, rinsing, or drying the left lower leg,  including the foot. Patient performs 70 % of bathing tasks. Bathing Score = 3,  Moderate Assistance. Patient requires the following assistive device(s): Grab  bar/arm rest to maintain balance.  DERREK Upper Body Dressing:  Upper Body Dressing Score = 5. Patient is supervision  for upper body dressing, requiring: Gathering/setting out clothes. No assistive  devices were required.  DERREK Lower Body Dressing:  Gathering clothes was not observed for this patient.  Wearing underwear or an undergarment was not observed for this patient. Patient  requires total assistance for holding clothing and/or threading the right leg  through the pants/skirt. Patient requires no physical assistance for donning  and/or doffing pants/skirt threading left leg.  Patient requires no physical  assistance for donning and/or doffing pants/skirt over hips and adjusting  fastener. Donning and/or doffing right sock is not applicable for this patient.  Patient requires total assistance for holding clothing and/or donning and/or  doffing left sock. Patient requires total assistance for holding clothing and/or  donning and/or doffing right shoe. Patient requires total assistance for holding  clothing and/or donning and/or doffing left shoe. Patient performs 33.33 % of  lower body dressing tasks. Lower Body Dressing Score = 2, Maximal Assistance. No  assistive devices were required.  DERREK Toileting:  Patient requires no physical assistance for adjusting clothing  before using a toilet, commode, bedpan, or urinal. Patient requires moderate  assistance for hygiene. Patient requires no physical assistance for adjusting  clothing after using a toilet, commode, bedpan, or urinal. Patient performs  66.67 % of toileting tasks. Toileting Score = 3, Moderate Assistance. Patient  requires the following assistive device(s): Grab bar.    DERREK Bladder Management  Level of Assistance:  Frequency/Number of Accidents this Shift:    DERREK Bowel Management  Level of Assistance:  Frequency/Number of Accidents this Shift:    DERREK Bed/Chair/Wheelchair Transfer:  Bed/chair/wheelchair Transfer Score = 4.  Patient performs 75% or more of effort and minimal assistance (little/incidental  help/lifting of one limb/steadying) for transferring to and from the  bed/chair/wheelchair, requiring: Steadying. Hand placement. Patient requires the  following assistive device(s): Walker.  DERREK Toilet Transfer:  DERREK Tub/Shower Transfer:    Previously Documented Mode of Locomotion at Discharge:  DERREK Expected Mode of Locomotion at Discharge:  DERREK Walk/Wheelchair:  DERREK Stairs:    DERREK Comprehension:  DERREK Expression:  DERREK Social Interaction:  DERREK Problem Solving:  DERREK Memory:    Therapy Mode Minutes  Occupational Therapy: Individual:  90 minutes.  Physical Therapy:  Speech Language Pathology:    Discharge Functional Goals:    Signed by: Tiffanie Bell, Occupational Therapist

## 2018-05-02 NOTE — PROGRESS NOTES
Inpatient Rehabilitation Functional Measures Assessment    Functional Measures  DERREK Eating:  DERREK Grooming:  DERREK Bathing:  DERRKE Upper Body Dressing:  DERREK Lower Body Dressing:  DERREK Toileting:    DERREK Bladder Management  Level of Assistance:  Bladder Score = 5.  Patient is supervision/set-up for  bladder management, requiring: Setting out equipment. Emptying equipment.  Patient requires the following assistive device(s):  Urinal. Adult brief.  Frequency/Number of Accidents this Shift:    DERREK Bowel Management  Level of Assistance:  Frequency/Number of Accidents this Shift:    [place] Bed/Chair/Wheelchair Transfer:  [place] Toilet Transfer:  [place] Tub/Shower Transfer:    Previously Documented Mode of Locomotion at Discharge:  [place] Expected Mode of Locomotion at Discharge:  [place] Walk/Wheelchair:  [place] Stairs:    [place] Comprehension:  [place] Expression:  [place] Social Interaction:  [place] Problem Solving:  [place] Memory:    Therapy Mode Minutes  Occupational Therapy:  Physical Therapy:  Speech Language Pathology:    Discharge Functional Goals:    Signed by: JENI Nobles

## 2018-05-02 NOTE — SIGNIFICANT NOTE
05/02/18 6620   Plan   Plan Spoke to pt and jason who wanted to know if pt would be safe to stay at home alone for one hour.  Spoke to PT and OT who do not recommend pt be alone at home; 24 hour assistance and supervision recommended.  SS explained this to pt and damire who are agreeable.  Jason's granddaughter will be staying with pt when she is working.  Explained RW is recommended by PT instead of rollator.  Will follow.   Patient/Family in Agreement with Plan yes

## 2018-05-02 NOTE — THERAPY TREATMENT NOTE
Inpatient Rehabilitation - Occupational Therapy Progress Note     Oscar     Patient Name: Carlos Cee  : 1934  MRN: [id number]    Today's Date: 2018                 Admit Date: 2018      Visit Dx:  No diagnosis found.    Patient Active Problem List   Diagnosis   • Debility         Therapy Treatment          IRF Treatment Summary     Row Name 18 1409             Evaluation/Treatment Time and Intent    Document Type therapy note (daily note);progress note/recertification  -AB      Mode of Treatment occupational therapy  -AB      Patient/Family Observations Pt. agreeable to therapy, no complaints  -AB      Recorded by [AB] Tiffanie Bell OT      Row Name 18 1409             Cognition/Psychosocial- PT/OT    Affect/Mental Status (Cognitive) WFL  -AB      Follows Commands (Cognition) WFL  -AB      Recorded by [AB] Tiffanie Bell OT      Row Name 18 1409             Transfer Assessment/Treatment    Bed-Chair Branch (Transfers) minimum assist (75% patient effort);verbal cues  -AB      Assistive Device (Bed-Chair Transfers) walker, front-wheeled;wheelchair  -AB      Sit-Stand Branch (Transfers) minimum assist (75% patient effort);verbal cues  -AB      Stand-Sit Branch (Transfers) minimum assist (75% patient effort);verbal cues  -AB      Recorded by [AB] Tiffanie Bell OT      Row Name 18 1409             Sit-Stand Transfer    Assistive Device (Sit-Stand Transfers) walker, front-wheeled  -AB      Recorded by [AB] Tiffanie Bell OT      Row Name 18 1409             Stand-Sit Transfer    Assistive Device (Stand-Sit Transfers) walker, front-wheeled  -AB      Recorded by [AB] Tiffanie Bell OT      Row Name 18 1409             Safety Issues, Functional Mobility    Comment, Safety Issues/Impairments (Mobility) fall precautions, <skin integrity, wound vac RLE  -AB      Recorded by [AB] Tiffanie Bell, OT       Row Name 05/02/18 1409             Bathing Assessment/Treatment    Bathing [place] Level moderate assist (50% patient effort);verbal cues  -AB      Assistive Device (Bathing) grab bar/tub rail  -AB      Bathing Position unsupported sitting;supported standing  -AB      Recorded by [AB] Tiffanie Bell, OT      Row Name 05/02/18 1409             Upper Body Dressing Assessment/Treatment    Upper Body Dressing Task set up assistance  -AB      Upper Body Dressing Position unsupported sitting  -AB      Recorded by [AB] Tiffanie Bell, OT      Row Name 05/02/18 1409             Lower Body Dressing Assessment/Treatment    Lower Body Dressing [place] Level maximum assist (25% patient effort);verbal cues  -AB      Lower Body Dressing Position unsupported sitting;supported standing  -AB      Recorded by [AB] Tiffanie Bell, OT      Row Name 05/02/18 1409             Grooming Assessment/Treatment    Grooming [place] Level supervision;set up  -AB      Grooming Position supported sitting;sink side  -AB      Recorded by [AB] Tiffanie Bell, OT      Row Name 05/02/18 1409             Toileting Assessment/Treatment    Toileting [place] Level moderate assist (50% patient effort);verbal cues  -AB      Assistive Device Use (Toileting) grab bar/safety frame;urinal  -AB      Toileting Position supported standing  -AB      Recorded by [AB] Tiffanie Bell, OT      Row Name 05/02/18 1409             Upper Extremity Seated Therapeutic Exercise    Device, Seated Upper Extremity (Therapeutic Exercise) restorator/arm bike   [id number], min resistance  -AB      Exercise Type, Seated Upper Extremity (Therapeutic Exercise) AROM (active range of motion)   BUE CoordEx, G/FMC TherEx/Act, strengthening  -AB      Expected Outcomes, Seated Upper Extremity (Therapeutic Exercise) improve functional tolerance, self-care activity;improve performance, BADLs  -AB      Sets/Reps Detail, Seated Upper  Extremity (Therapeutic Exercise) BUE Wrist Rolls X2  -AB      Recorded by [AB] Tiffanie Bell OT      Row Name 05/02/18 1409             Positioning and Restraints    Post Treatment Position wheelchair  -AB      In Bed supine;call light within reach;encouraged to call for assist;side rails up x2;RLE elevated   in PM  -AB      In Wheelchair sitting;with PT;legs elevated   in AM  -AB      Recorded by [AB] Tiffanie Bell OT        User Key  (r) = Recorded By, (t) = Taken By, (c) = Cosigned By    Initials Name Effective Dates    AB Tiffanie Bell OT 04/03/18 -           Wound 04/23/18 1945 Right lateral calf surgical (Active)   Dressing Appearance dry;intact 5/2/2018 12:37 PM   Base red/granulating 5/2/2018 12:37 PM   Red (%), Wound Tissue Color 99.5 5/2/2018 12:37 PM   Periwound intact;edematous 5/2/2018 12:37 PM   Edges irregular;rolled/closed 5/2/2018 12:37 PM   Drainage Characteristics/Odor bleeding controlled 5/2/2018 12:37 PM   Wound Output (mL) 225 5/2/2018 10:30 AM       NPWT (Negative Pressure Wound Therapy) 04/25/18 1600 Right Lateral Calf (Active)   Therapy Setting continuous therapy 5/2/2018 12:37 PM   Dressing foam, black 5/2/2018 12:37 PM   Pressure Setting 125 mmHg 5/2/2018 12:37 PM   Sponges Inserted 4 5/2/2018 12:37 PM   Sponges Removed 4 5/2/2018 12:37 PM   Finger sweep complete Yes 5/2/2018 12:37 PM         OT Recommendation and Plan    Anticipated Equipment Needs At Discharge (OT Eval):  (TBD)  Planned Therapy Interventions (OT Eval): activity tolerance training, adaptive equipment training, BADL retraining, ROM/therapeutic exercise, strengthening exercise, transfer/mobility retraining    Plan of Care Review  Plan of Care Reviewed With: patient  Plan of Care Reviewed With: patient  IRF Plan of Care Review: progress ongoing, continue  Progress, Functional Goals: demonstrating adequate progress  Outcome Summary: Pt. demonstrated improvements today w/ self care tasks and fxl  transfers. He continues to require assistance w/ LB dressing d/t RLE swelling/wound vac. He will likely benefit from continued skilled OT services. Continue w/ current POC.           OT IRF GOALS     Row Name 05/02/18 1244 05/02/18 1242 04/25/18 1144       Transfer Goal 1 (OT-IRF)    Activity/Assistive Device (Transfer Goal 1, OT-IRF)  --  -- bed-to-chair/chair-to-bed;toilet  -AB    [place] Level (Transfer Goal 1, OT-IRF)  --  -- standby assist  -AB    Time Frame (Transfer Goal 1, OT-IRF)  --  -- long term goal (LTG);by discharge  -AB       Bathing Goal 1 (OT-IRF)    Activity/Device (Bathing Goal 1, OT-IRF) bathing skills, all  -AB  -- bathing skills, all  -AB    [place] Level (Bathing Goal 1, OT-IRF) moderate assist (50-74% patient effort);minimum assist (75% or more patient effort)  -AB  -- moderate assist (50-74% patient effort);verbal cues required  -AB    Time Frame (Bathing Goal 1, OT-IRF) short term goal (STG);5 - 7 days  -AB  -- short term goal (STG);5 - 7 days  -AB    Progress/Outcomes (Bathing Goal 1, OT-IRF)  -- goal met  -AB  --       Bathing Goal 2 (OT-IRF)    Activity/Device (Bathing Goal 2, OT-IRF)  --  -- bathing skills, all  -AB    [place] Level (Bathing Goal 2, OT-IRF)  --  -- minimum assist (75% or more patient effort);verbal cues required  -AB    Time Frame (Bathing Goal 2, OT-IRF)  --  -- long term goal (LTG);by discharge  -AB       LB Dressing Goal 1 (OT-IRF)    Activity/Device (LB Dressing Goal 1, OT-IRF) lower body dressing  -AB  -- lower body dressing  -AB    [place] (LB Dressing Goal 1, OT-IRF) maximum assist (25-49% patient effort);moderate assist (50-74% patient effort)  -AB  -- maximum assist (25-49% patient effort)  -AB    Time Frame (LB Dressing Goal 1, OT-IRF) short term goal (STG);5 - 7 days  -AB  -- short term goal (STG);5 - 7 days  -AB    Progress/Outcomes (LB Dressing Goal 1, OT-IRF)  -- goal met  -AB  --       LB Dressing Goal 2 (OT-IRF)    Activity/Device (LB  Dressing Goal 2, OT-IRF)  --  -- lower body dressing  -AB    [place] (LB Dressing Goal 2, OT-IRF)  --  -- moderate assist (50-74% patient effort)  -AB    Time Frame (LB Dressing Goal 2, OT-IRF)  --  -- long term goal (LTG);by discharge  -AB       Toileting Goal 1 (OT-IRF)    [place] Level (Toileting Goal 1, OT-IRF)  --  -- minimum assist (75% or more patient effort)  -AB    Time Frame (Toileting Goal 1, OT-IRF)  --  -- long term goal (LTG);by discharge  -AB      User Key  (r) = Recorded By, (t) = Taken By, (c) = Cosigned By    Initials Name Provider Type    AB Tiffanie Bell OT Occupational Therapist                 Time Calculation:           Time Calculation- OT     Row Name 05/02/18 1409 05/02/18 1244          Time Calculation- OT    OT Start Time 1330  -AB 0800  -AB     OT Stop Time 1340  -AB 0920  -AB     OT Time Calculation (min) 10 min  -AB 80 min  -AB     Total Timed Code Minutes- OT 10 minute(s)  -AB 80 minute(s)  -AB     OT Non-Billable Time (min)  -- 25 min  -AB       User Key  (r) = Recorded By, (t) = Taken By, (c) = Cosigned By    Initials Name Provider Type    AB Tiffanie Bell OT Occupational Therapist             Therapy Charges for Today     Code Description Service Date Service Provider Modifiers Qty    53071565255 HC OT THERAPEUTIC ACT EA 15 MIN 5/1/2018 Tiffanie Bell OT GO 5    99470766631 HC OT SELF CARE/MGMT/TRAIN EA 15 MIN 5/1/2018 Tiffanie Bell OT GO 1    44449133193 HC OT THER SUPP EA 15 MIN 5/1/2018 Tiffanie Bell OT GO 1    81625489696 HC OT THERAPEUTIC ACT EA 15 MIN 5/2/2018 Tiffanie Bell, OT GO 3    13300714315 HC OT SELF CARE/MGMT/TRAIN EA 15 MIN 5/2/2018 Tiffanie Bell OT GO 3    62044377707 HC OT THER SUPP EA 15 MIN 5/2/2018 Tiffanie Bell OT GO 1                   Tiffanie Bell, OT  5/2/2018

## 2018-05-02 NOTE — PROGRESS NOTES
Rehabilitation Nursing  Inpatient Rehabilitation Functional Measures Assessment and Plan of Care    Plan of Care/Interventions  Copy from POC    Pain    Pain Management (Active)  Current Status (5/5/2018 12:00:00 AM): impaired comfort r/t surgical wound  Weekly Goal: state decrease of pain  Discharge Goal: identify measures to control pain    Body Function Structure    Balance (Active)  Current Status (5/5/2018 12:00:00 AM): impaired physical mobility  Weekly Goal: increase in physical activity  Discharge Goal: demonstrate use of adaptive equipment    Safety    Potential for Injury (Active)  Current Status (4/24/2018 12:00:00 AM): risk for injury r/t falls  Weekly Goal: free from injury this shift  Discharge Goal: free from injury during hospital stay    RN Interventions    Body Function Structure -  RN: assess mobility every shift and prn [RN]  RN: assess for skin breakdown every shift and prn [RN]    Pain -  RN: instruct to call staff for pain [RN]  RN: administer pain meds as ordered [RN]    Safety -  RN: call light within reach at all times [RN]  RN: assess and initiate falls protocol per policy [RN]    Functional Measures  DERREK Eating:  DERREK Grooming:  DERREK Bathing:  DERREK Upper Body Dressing:  DERREK Lower Body Dressing:  DERREK Toileting:    DERREK Bladder Management  Level of Assistance:  Frequency/Number of Accidents this Shift:    DERREK Bowel Management  Level of Assistance:  Frequency/Number of Accidents this Shift:    DERREK Bed/Chair/Wheelchair Transfer:  DERREK Toilet Transfer:  DERREK Tub/Shower Transfer:    Previously Documented Mode of Locomotion at Discharge:  DERREK Expected Mode of Locomotion at Discharge:  DERREK Walk/Wheelchair:  DREREK Stairs:    DERREK Comprehension:  Auditory comprehension is the usual mode. Comprehension  Score = 6, Modified [place].  Patient comprehends complex/abstract  information in their primary language, requiring: Additional time.  DERREK Expression:  Vocal expression is the usual mode. Expression  Score = 6,  Modified Independent.  Patient expresses complex/abstract information in their  primary language, requiring: Additional time.  DERREK Social Interaction:  Social Interaction Score = 6, Modified Independent.  Patient is modified independent for social interaction, requiring: Requires  additional time.  DERREK Problem Solving:  Problem Solving Score = 6, Modified [place].  Patient  makes appropriate decisions in order to solve complex problems, but requires  extra time.  DERREK Memory:  Memory Score = 6, Modified [place].  Patient is modified  independent for memory, requiring: Requires additional time.    Signed by: Valeria Quarles Nurse

## 2018-05-02 NOTE — PLAN OF CARE
Problem: Patient Care Overview  Goal: Plan of Care Review  Outcome: Ongoing (interventions implemented as appropriate)      Problem: Skin Injury Risk (Adult)  Goal: Skin Health and Integrity  Outcome: Ongoing (interventions implemented as appropriate)      Problem: Functional Mobility Impairment (IRF) (Adult)  Goal: Optimal/Safe Level of [place] with Mobility  Outcome: Ongoing (interventions implemented as appropriate)      Problem: BADL Skill Impairment (IRF) (Adult)  Goal: Optimal Functional [place] for BADLs (Performing or Directing)  Outcome: Ongoing (interventions implemented as appropriate)      Problem: Wound (Includes Pressure Injury) (Adult)  Goal: Signs and Symptoms of Listed Potential Problems Will be Absent, Minimized or Managed (Wound)  Outcome: Ongoing (interventions implemented as appropriate)

## 2018-05-03 PROCEDURE — 97530 THERAPEUTIC ACTIVITIES: CPT

## 2018-05-03 PROCEDURE — 97110 THERAPEUTIC EXERCISES: CPT

## 2018-05-03 PROCEDURE — 94799 UNLISTED PULMONARY SVC/PX: CPT

## 2018-05-03 PROCEDURE — 97535 SELF CARE MNGMENT TRAINING: CPT

## 2018-05-03 PROCEDURE — 97116 GAIT TRAINING THERAPY: CPT

## 2018-05-03 RX ADMIN — APIXABAN 5 MG: 5 TABLET, FILM COATED ORAL at 09:20

## 2018-05-03 RX ADMIN — Medication 1000 MCG: at 09:20

## 2018-05-03 RX ADMIN — PROBENECID 500 MG: 500 TABLET, FILM COATED ORAL at 09:20

## 2018-05-03 RX ADMIN — OXYCODONE HYDROCHLORIDE 5 MG: 5 TABLET ORAL at 09:19

## 2018-05-03 RX ADMIN — OXYCODONE HYDROCHLORIDE 5 MG: 5 TABLET ORAL at 20:37

## 2018-05-03 RX ADMIN — ALFUZOSIN HYDROCHLORIDE 10 MG: 10 TABLET, EXTENDED RELEASE ORAL at 09:20

## 2018-05-03 RX ADMIN — POTASSIUM CHLORIDE 10 MEQ: 750 TABLET, FILM COATED, EXTENDED RELEASE ORAL at 09:24

## 2018-05-03 RX ADMIN — ALBUTEROL SULFATE 2.5 MG: 2.5 SOLUTION RESPIRATORY (INHALATION) at 07:37

## 2018-05-03 RX ADMIN — CARVEDILOL 12.5 MG: 6.25 TABLET, FILM COATED ORAL at 09:20

## 2018-05-03 RX ADMIN — CHOLECALCIFEROL TAB 10 MCG (400 UNIT) 1000 UNITS: 10 TAB at 09:20

## 2018-05-03 RX ADMIN — FLUTICASONE PROPIONATE 2 SPRAY: 50 SPRAY, METERED NASAL at 09:19

## 2018-05-03 RX ADMIN — MECLIZINE HCL 12.5 MG: 12.5 TABLET ORAL at 09:24

## 2018-05-03 RX ADMIN — CARVEDILOL 12.5 MG: 6.25 TABLET, FILM COATED ORAL at 20:37

## 2018-05-03 RX ADMIN — MECLIZINE HCL 12.5 MG: 12.5 TABLET ORAL at 20:37

## 2018-05-03 RX ADMIN — KETOTIFEN FUMARATE 1 DROP: 0.35 SOLUTION/ DROPS OPHTHALMIC at 20:38

## 2018-05-03 RX ADMIN — KETOTIFEN FUMARATE 1 DROP: 0.35 SOLUTION/ DROPS OPHTHALMIC at 09:19

## 2018-05-03 RX ADMIN — GUAIFENESIN 600 MG: 600 TABLET, EXTENDED RELEASE ORAL at 20:37

## 2018-05-03 RX ADMIN — PANTOPRAZOLE SODIUM 40 MG: 40 TABLET, DELAYED RELEASE ORAL at 09:20

## 2018-05-03 RX ADMIN — APIXABAN 5 MG: 5 TABLET, FILM COATED ORAL at 20:37

## 2018-05-03 RX ADMIN — FUROSEMIDE 40 MG: 40 TABLET ORAL at 09:20

## 2018-05-03 RX ADMIN — NIFEDIPINE 30 MG: 30 TABLET, EXTENDED RELEASE ORAL at 09:20

## 2018-05-03 RX ADMIN — GUAIFENESIN 600 MG: 600 TABLET, EXTENDED RELEASE ORAL at 09:20

## 2018-05-03 RX ADMIN — LEVOTHYROXINE SODIUM 150 MCG: 150 TABLET ORAL at 05:46

## 2018-05-03 RX ADMIN — CARBOXYMETHYLCELLULOSE SODIUM 2 DROP: 5 SOLUTION/ DROPS OPHTHALMIC at 20:38

## 2018-05-03 RX ADMIN — PANTOPRAZOLE SODIUM 40 MG: 40 TABLET, DELAYED RELEASE ORAL at 17:39

## 2018-05-03 NOTE — PROGRESS NOTES
Inpatient Rehabilitation Functional Measures Assessment    Functional Measures  DERREK Eating:  DERREK Grooming: Grooming Score = 5. Patient is supervision/set-up for grooming,  requiring: Setting out grooming equipment. No assistive devices were required.  DERREK Bathing:  DERREK Upper Body Dressing:  DERREK Lower Body Dressing:  DERREK Toileting:    DERREK Bladder Management  Level of Assistance:  Frequency/Number of Accidents this Shift:    DERREK Bowel Management  Level of Assistance:  Frequency/Number of Accidents this Shift:    DERREK Bed/Chair/Wheelchair Transfer:  [facility] Toilet Transfer:  [facility] Tub/Shower Transfer:    Previously Documented Mode of Locomotion at Discharge:  [facility] Expected Mode of Locomotion at Discharge:  [facility] Walk/Wheelchair:  [facility] Stairs:    DERREK Comprehension:  DERREK Expression:  DERREK Social Interaction:  [facility] Problem Solving:  DERREK Memory:    Therapy Mode Minutes  Occupational Therapy: Individual: 90 minutes.  Physical Therapy:  Speech Language Pathology:    Discharge Functional Goals:    Signed by: Tiffanie Bell, Occupational Therapist

## 2018-05-03 NOTE — PROGRESS NOTES
Inpatient Rehabilitation Functional Measures Assessment    Functional Measures  DERREK Eating:  DERREK Grooming:  DERREK Bathing:  DERREK Upper Body Dressing:  DERREK Lower Body Dressing:  DERREK Toileting:    DERREK Bladder Management  Level of Assistance:  Frequency/Number of Accidents this Shift:    DERREK Bowel Management  Level of Assistance:  Frequency/Number of Accidents this Shift:    DERREK Bed/Chair/Wheelchair Transfer:  DERREK Toilet Transfer:  DERREK Tub/Shower Transfer:    Previously Documented Mode of Locomotion at Discharge:  DERREK Expected Mode of Locomotion at Discharge:  DERREK Walk/Wheelchair:  DERREK Stairs:    DERREK Comprehension:  Auditory comprehension is the usual mode. Comprehension  Score = 6, Modified [place].  Patient comprehends complex/abstract  information in their primary language, requiring:  DERREK Expression:  Vocal expression is the usual mode. Expression Score = 6,  Modified Independent.  Patient expresses complex/abstract information in their  primary language, requiring:  DERREK Social Interaction:  Social Interaction Score = 6, Modified Independent.  Patient is modified independent for social interaction, requiring:  DERREK Problem Solving:  Problem Solving Score = 6, Modified [place].  Patient  makes appropriate decisions in order to solve complex problems, but requires  extra time.  DERREK Memory:  Memory Score = 6, Modified [place].  Patient is modified  independent for memory, requiring: Requires additional time.    Therapy Mode Minutes  Occupational Therapy:  Physical Therapy:  Speech Language Pathology:    Discharge Functional Goals:    Signed by: Nurse Prudencio

## 2018-05-03 NOTE — PLAN OF CARE
Problem: Patient Care Overview  Goal: Plan of Care Review  Outcome: Ongoing (interventions implemented as appropriate)      Problem: Fall Risk (Adult)  Goal: Absence of Fall  Outcome: Ongoing (interventions implemented as appropriate)      Problem: Skin Injury Risk (Adult)  Goal: Skin Health and Integrity  Outcome: Ongoing (interventions implemented as appropriate)      Problem: Functional Mobility Impairment (IRF) (Adult)  Goal: Optimal/Safe Level of [place] with Mobility  Outcome: Ongoing (interventions implemented as appropriate)      Problem: BADL Skill Impairment (IRF) (Adult)  Goal: Optimal Functional [place] for BADLs (Performing or Directing)  Outcome: Ongoing (interventions implemented as appropriate)      Problem: Wound (Includes Pressure Injury) (Adult)  Goal: Signs and Symptoms of Listed Potential Problems Will be Absent, Minimized or Managed (Wound)  Outcome: Ongoing (interventions implemented as appropriate)

## 2018-05-03 NOTE — PLAN OF CARE
Problem: Patient Care Overview  Goal: Plan of Care Review   05/03/18 1538   Patient Care Overview   IRF Plan of Care Review progress ongoing, continue   Progress, Functional Goals demonstrating adequate progress   Coping/Psychosocial   Plan of Care Reviewed With patient   OTHER   Outcome Summary Patient continues to require skilled care per POC for further strengthening and improved functional mobility.

## 2018-05-03 NOTE — THERAPY TREATMENT NOTE
Inpatient Rehabilitation - Physical Therapy Treatment Note   Oscar     Patient Name: Carlos Cee  : 1934  MRN: [id number]    Today's Date: 5/3/2018                 Admit Date: 2018      Visit Dx:    No diagnosis found.    Patient Active Problem List   Diagnosis   • Debility       Therapy Treatment          IRF Treatment Summary     Row Name 18 1532 18 1422          Evaluation/Treatment Time and Intent    Document Type therapy note (daily note)  -RF therapy note (daily note)  -AB     Mode of Treatment individual therapy;physical therapy  -RF occupational therapy  -AB     Patient/Family Observations Pt agreeable to treatment session with increased c/o pain and fatigue noted.   -RF Pt. agreeable to therapy, no complaints  -AB     Recorded by [RF] Catie Cooper PTA [AB] Tiffanie Bell OT     Row Name 18 15318 142          Cognition/Psychosocial- PT/OT    Affect/Mental Status (Cognitive) WFL  -RF WFL  -AB     Behavioral Issues (Cognitive) uncooperative   PM session, not agreeable to participate  -RF  --     Orientation Status (Cognition) oriented x 3  -RF  --     Follows Commands (Cognition) WFL  -RF WFL  -AB     Recorded by [RF] Catie Cooper PTA [AB] Tiffanie Bell OT     Row Name 18 153             Mobility    Extremity Weight-bearing Status left lower extremity;right lower extremity  -RF      Left Lower Extremity (Weight-bearing Status) weight-bearing as tolerated (WBAT)  -RF      Right Lower Extremity (Weight-bearing Status) weight-bearing as tolerated (WBAT)  -RF      Recorded by [RF] Catie Cooper PTA      Row Name 18 153             Transfer Assessment/Treatment    Transfer Assessment/Treatment bed-chair transfer;chair-bed transfer;sit-stand transfer;stand-sit transfer;stand pivot/stand step transfer  -RF      Bed-Chair [place] (Transfers) verbal cues;nonverbal cues (demo/gesture);contact guard  -RF      Chair-Bed  [place] (Transfers) verbal cues;nonverbal cues (demo/gesture);contact guard  -RF      Assistive Device (Bed-Chair Transfers) walker, front-wheeled  -RF      Sit-Stand [place] (Transfers) verbal cues;nonverbal cues (demo/gesture);contact guard  -RF      Stand-Sit [place] (Transfers) verbal cues;nonverbal cues (demo/gesture);contact guard  -RF      [place] Level (Toilet Transfer) verbal cues;nonverbal cues (demo/gesture);contact guard;minimum assist (75% patient effort)  -RF      Assistive Device (Toilet Transfer) grab bars/safety frame;wheelchair  -RF      Recorded by [RF] Catie Cooper, [facility]      Row Name 05/03/18 1532             Chair-Bed Transfer    Assistive Device (Chair-Bed Transfers) walker, front-wheeled  -RF      Recorded by [RF] Catie Cooper, [facility]      Row Name 05/03/18 1532             Sit-Stand Transfer    Assistive Device (Sit-Stand Transfers) walker, front-wheeled  -RF      Recorded by [RF] Catie Cooper, PTA      Row Name 05/03/18 1532             Stand-Sit Transfer    Assistive Device (Stand-Sit Transfers) walker, front-wheeled  -RF      Recorded by [RF] Catie Cooper, [facility]      Row Name 05/03/18 1532             Stand Pivot/Stand Step Transfer    Stand Pivot/Stand Step [place] verbal cues;nonverbal cues (demo/gesture);contact guard  -RF      Assistive Device (Stand Pivot Stand Step Transfer) walker, front-wheeled  -RF      Recorded by [RF] Catie Cooper, NAYANA      Row Name 05/03/18 1532             Toilet Transfer    Type (Toilet Transfer) stand pivot/stand step  -RF      Recorded by [RF] Catie Cooper, PTA      Row Name 05/03/18 1532             Gait/Stairs Assessment/Training    Gait/Stairs Assessment/Training gait/ambulation independence;gait/ambulation assistive device;distance ambulated;gait pattern;gait deviations  -RF      [place] Level (Gait) verbal cues;nonverbal cues (demo/gesture);contact guard  -RF      Assistive Device (Gait) walker,  front-wheeled  -RF      Distance in Feet (Gait) 85  -RF      Pattern (Gait) step-to  -RF      Deviations/Abnormal Patterns (Gait) antalgic;andreea decreased;gait speed decreased;stride length decreased   R Darco shoe in place  -RF      Bilateral Gait Deviations forward flexed posture;heel strike decreased  -RF      Maintains Weight-bearing Status (Gait) able to maintain  -RF      Recorded by [RF] Catie Cooper PTA      Row Name 05/03/18 1532 05/03/18 1422          Safety Issues, Functional Mobility    Impairments Affecting Function (Mobility) balance;endurance/activity tolerance;pain;shortness of breath  -RF  --     Comment, Safety Issues/Impairments (Mobility)  -- fall precautions, <skin integrity, wound vac RLE  -AB     Recorded by [RF] Catie Cooper PTA [AB] Tiffanie Bell, OT     Row Name 05/03/18 1422             Grooming Assessment/Treatment    Grooming [place] Level supervision;set up  -AB      Grooming Position supported sitting;sink side  -AB      Recorded by [AB] Tiffanie Bell, OT      Row Name 05/03/18 1532             Sensory    Hearing Status hearing impairment, right  -RF      Recorded by [RF] Catie Cooper, NAYANA      Row Name 05/03/18 1532             Vision Assessment/Intervention    Visual Impairment/Limitations WFL  -RF      Recorded by [RF] Catie Cooper, [facility]      Row Name 05/03/18 1532             Pain Scale: Numbers Pre/Post-Treatment    Pain Location - Side Right  -RF      Pain Location - Orientation lateral  -RF      Pain Location other (see comments)   lower leg/ wound site  -RF      Recorded by [RF] Catie Cooper [facility]      Row Name 05/03/18 1532             Pain Scale: FACES Pre/Post-Treatment    Pain: FACES Scale, Pretreatment 4-->hurts little more  -RF      Pain: FACES Scale, Post-Treatment 4-->hurts little more  -RF      Recorded by [RF] Catie Cooper PTA      Row Name 05/03/18 1532             Balance    Balance static sitting balance;static  standing balance;dynamic standing balance  -RF      Recorded by [RF] Catie Cooper, NAYANA      Row Name 05/03/18 1532             Edema Assessment & Management    Location (Edema) lower extremity, right  -RF      Recorded by [RF] Catie Cooper, NAYANA      Row Name 05/03/18 1532             Edema Symptoms/Interventions    Description (Edema) diffuse;pitting  -RF      Recorded by [RF] Catie Cooper, PTA      Row Name 05/03/18 1532             Therapeutic Exercise    Therapeutic Exercise supine, lower extremities  -RF      Recorded by [RF] Catie Cooper, PTA      Row Name 05/03/18 1422             Upper Extremity Seated Therapeutic Exercise    Device, Seated Upper Extremity (Therapeutic Exercise) restorator/arm bike   0ycstV1, min resistance  -AB      Exercise Type, Seated Upper Extremity (Therapeutic Exercise) AROM (active range of motion)   BUE CoordEx, G/FMC TherEx/Act, strengthening  -AB      Expected Outcomes, Seated Upper Extremity (Therapeutic Exercise) improve functional tolerance, self-care activity;improve performance, BADLs  -AB      Sets/Reps Detail, Seated Upper Extremity (Therapeutic Exercise) BUE Wrist Rolls X3  -AB      Recorded by [AB] Tiffanie Bell, OT      Row Name 05/03/18 1532             Lower Extremity Seated Therapeutic Exercise    Performed, Seated Lower Extremity (Therapeutic Exercise) hip flexion/extension;hip abduction/adduction;ankle dorsiflexion/plantarflexion;LAQ (long arc quad), knee extension;other (see comments)   ball sqz  -RF      Device, Seated Lower Extremity (Therapeutic Exercise) elastic bands/tubing;small ball  -RF      Exercise Type, Seated Lower Extremity (Therapeutic Exercise) AROM (active range of motion);eccentric contraction;isotonic contraction, concentric  -RF      Expected Outcomes, Seated Lower Extremity (Therapeutic Exercise) improve functional tolerance, community activity;improve functional tolerance, household activity;improve functional tolerance,  self-care activity;improve performance, gait skills;strengthen, facilitate independent active range of motion  -RF      Sets/Reps Detail, Seated Lower Extremity (Therapeutic Exercise) 2X10  -RF      Recorded by [RF] Catie Cooper PTA      Row Name 05/03/18 1532             Lower Extremity Supine Therapeutic Exercise    Performed, Supine Lower Extremity (Therapeutic Exercise) hip flexion/extension;hip abduction/adduction;hip external/internal rotation;knee flexion/extension;ankle dorsiflexion/plantarflexion;SAQ (short arc quad) over bolster;quadriceps sets;gluteal sets;heel slides  -RF      Exercise Type, Supine Lower Extremity (Therapeutic Exercise) AAROM (active assistive range of motion);AROM (active range of motion);eccentric contraction;isotonic contraction, concentric  -RF      Expected Outcomes, Supine Lower Extremity (Therapeutic Exercise) improve functional tolerance, community activity;improve functional tolerance, household activity;improve functional tolerance, self-care activity;improve performance, gait skills;strengthen, facilitate independent active range of motion  -RF      Sets/Reps Detail, Supine Lower Extremity (Therapeutic Exercise) 2X10  -RF      Recorded by [RF] Catie Cooper PTA      Row Name 05/03/18 1532 05/03/18 1422          Positioning and Restraints    Pre-Treatment Position in bed  -RF  --     Post Treatment Position bed  -RF wheelchair  -AB     In Bed supine;call light within reach;encouraged to call for assist;exit alarm on  -RF supine;call light within reach;encouraged to call for assist;side rails up x2   in PM  -AB     In Wheelchair  -- sitting;with PT;legs elevated   in AM  -AB     Recorded by [RF] Catie Cooper PTA [AB] Tiffanie Bell OT       User Key  (r) = Recorded By, (t) = Taken By, (c) = Cosigned By    Initials Name Effective Dates    AB Tiffanie Bell OT 04/03/18 -     RF Catie Cooper PTA 03/07/18 -           Wound 04/23/18 1945 Right  lateral calf surgical (Active)   Dressing Appearance intact;dry 5/3/2018  7:20 AM   Drainage Characteristics/Odor brown 5/2/2018  8:00 PM   Wound Output (mL) 300 5/3/2018  1:29 AM       NPWT (Negative Pressure Wound Therapy) 04/25/18 1600 Right Lateral Calf (Active)   Therapy Setting continuous therapy 5/3/2018  7:20 AM   Dressing foam, black 5/3/2018  7:20 AM   Pressure Setting 125 mmHg 5/3/2018  7:20 AM       Physical Therapy Education     Title: PT OT SLP Therapies (Done)     Topic: Physical Therapy (Done)     Point: Mobility training (Done)    Learning Progress Summary     Learner Status Readiness Method Response Comment Documented by    Patient Done Acceptance E VU  RF 05/03/18 1537     Done Acceptance E,D VU,NR  AG 05/02/18 1753     Done Acceptance E VU  RF 05/01/18 1437     Done Acceptance E VU  RF 04/30/18 1611     Done Acceptance E VU,NR  BE 04/28/18 1340     Done Acceptance E VU  KB 04/27/18 1710     Done Acceptance E,D VU,NR  AG 04/26/18 1635     Done Acceptance E,D VU,NR  AG 04/25/18 1731          Point: Home exercise program (Done)    Learning Progress Summary     Learner Status Readiness Method Response Comment Documented by    Patient Done Acceptance E VU  RF 05/03/18 1537     Done Acceptance E,D VU,NR  AG 05/02/18 1753     Done Acceptance E VU  RF 05/01/18 1437     Done Acceptance E VU  RF 04/30/18 1611     Done Acceptance E VU,NR  BE 04/28/18 1340     Done Acceptance E VU  KB 04/27/18 1710     Done Acceptance E,D VU,NR  AG 04/26/18 1635     Done Acceptance E,D VU,NR  AG 04/25/18 1731          Point: Body mechanics (Done)    Learning Progress Summary     Learner Status Readiness Method Response Comment Documented by    Patient Done Acceptance E VU  RF 05/03/18 1537     Done Acceptance E,D VU,NR  AG 05/02/18 1753     Done Acceptance E VU  RF 05/01/18 1437     Done Acceptance E VU  RF 04/30/18 1611     Done Acceptance E VU,NR  BE 04/28/18 1340     Done Acceptance E VU  KB 04/27/18 1710     Done  Acceptance E,D VU,NR  AG 04/26/18 1635     Done Acceptance E,D VU,NR  AG 04/25/18 1731          Point: Precautions (Done)    Learning Progress Summary     Learner Status Readiness Method Response Comment Documented by    Patient Done Acceptance E VU  RF 05/03/18 1537     Done Acceptance E,D VU,NR  AG 05/02/18 1753     Done Acceptance E VU  RF 05/01/18 1437     Done Acceptance E VU  RF 04/30/18 1611     Done Acceptance E VU,NR  BE 04/28/18 1340     Done Acceptance E VU  KB 04/27/18 1710     Done Acceptance E,D VU,NR  AG 04/26/18 1635     Done Acceptance E,D VU,NR  AG 04/25/18 1731                      User Key     Initials Effective Dates Name Provider Type Discipline     04/03/18 -  Agnieszka Cool, PT Physical Therapist PT    BE 04/03/18 -  Lilliana Hyde, PT Physical Therapist PT    RF 03/07/18 -  Catie Cooper PTA Physical Therapy Assistant PT     12/20/17 -  Kay Pena PTA Physical Therapy Assistant PT                  PT Recommendation and Plan         Plan of Care Reviewed With: patient  IRF Plan of Care Review: progress ongoing, continue  Progress, Functional Goals: demonstrating adequate progress  Outcome Summary: Patient continues to require skilled care per POC for further strengthening and improved functional mobility.                    Time Calculation:           PT Charges     Row Name 05/03/18 1539             Time Calculation    Start Time 0950  -RF      Stop Time 1120  -RF      Time Calculation (min) 90 min  -RF      PT Received On 05/03/18  -RF      PT - Next Appointment 05/04/18  -RF      PT Goal Re-Cert Due Date 05/09/18  -RF         Time Calculation- PT    Total Timed Code Minutes- PT 90 minute(s)  -RF        User Key  (r) = Recorded By, (t) = Taken By, (c) = Cosigned By    Initials Name Provider Type    RF Catie Cooper, NAYANA Physical Therapy Assistant            Therapy Charges for Today     Code Description Service Date Service Provider Modifiers Qty    43566488863  PT THER  PROC EA 15 MIN 5/3/2018 Catie Cooper, PTA GP 3    [id number]  PT THERAPEUTIC ACT EA 15 MIN 5/3/2018 Catie Cooper, PTA GP 1    [id number] HC GAIT TRAINING EA 15 MIN 5/3/2018 Catie Cooper, PTA GP 2    [id number]  PT THER SUPP EA 15 MIN 5/3/2018 Catie Cooper, PTA GP 2                   Catie Cooper, NAYANA  5/3/2018

## 2018-05-03 NOTE — SIGNIFICANT NOTE
05/03/18 0947   Plan   Plan Spoke to Dr. Franklin who is agreeable to be pt's PCP at discharge.  SS informed pt and jason Dejesus [phone].     Patient/Family in Agreement with Plan yes

## 2018-05-03 NOTE — PROGRESS NOTES
Inpatient Rehabilitation Functional Measures Assessment    Functional Measures  DERREK Eating:  Eating Score = 5. Patient is supervision/set-up for eating,  requiring: Opening containers. No assistive devices were required.  DERREK Grooming: Activity was not observed.  DERREK Bathing:  Activity was not observed.  DERREK Upper Body Dressing:  Activity was not observed.  DERREK Lower Body Dressing:  Activity was not observed.  DERREK Toileting:  Patient requires minimal assistance for adjusting clothing  before using a toilet, commode, bedpan, or urinal. Patient requires minimal  assistance for hygiene. Patient requires minimal assistance for adjusting  clothing after using a toilet, commode, bedpan, or urinal. Patient performs 0 -  24% of toileting tasks.  Toileting Score = 1, Total Assistance. No assistive  devices were required.    DERREK Bladder Management  Level of Assistance:  Bladder Score = 3. Patient performs 50-74% of tasks and  requires moderate assistance for bladder management. [place] provides some assist  to position the urinal, holds it in place, or empties the urinal.  Frequency/Number of Accidents this Shift:  Bladder accidents this shift:  0 .  Patient has not had an accident, but used a device/medication this shift  requiring: urinal .    DERREK Bowel Management  Level of Assistance: Activity was not observed.  Frequency/Number of Accidents this Shift: Bowel accidents this shift: 0 .  Patient has not had an accident this shift.    DERREK Bed/Chair/Wheelchair Transfer:  Bed/chair/wheelchair Transfer Score = 3.  Patient performs 50-74% of effort and requires moderate assistance (some  lifting) for transferring to and from the bed/chair/wheelchair. Patient requires  the following assistive device(s): Elevated head of bed. Elevated bed/surface.    DERREK Toilet Transfer:  Toilet Transfer Score = 3.  Patient performs 50-74% of  effort and requires moderate assistance (some lifting) for transferring to and  from the toilet/commode.  Patient requires the following assistive device(s):  Grab bars. Safety frame/over the toilet. Specialized seat.  DERREK Tub/Shower Transfer:  Activity was not observed.    Previously Documented Mode of Locomotion at Discharge:  [facility] Expected Mode of Locomotion at Discharge:  [facility] Walk/Wheelchair:  DERREK Stairs:    DERREK Comprehension:  DERREK Expression:  DERREK Social Interaction:  [facility] Problem Solving:  [facility] Memory:    Therapy Mode Minutes  Occupational Therapy:  Physical Therapy:  Speech Language Pathology:    Discharge Functional Goals:    Signed by: JENI Noel

## 2018-05-03 NOTE — PLAN OF CARE
Problem: Patient Care Overview  Goal: Plan of Care Review  Outcome: Ongoing (interventions implemented as appropriate)   05/03/18 1412   Patient Care Overview   IRF Plan of Care Review progress ongoing, continue   Progress, Functional Goals progress more gradual than expected   Coping/Psychosocial   Plan of Care Reviewed With patient   OTHER   Outcome Summary Pt. w/ good tolerance and rest breaks provided. Continue w/ current POC.

## 2018-05-03 NOTE — PROGRESS NOTES
"     LOS: 9 days     Chief Complaint:  Right leg injury    Subjective     Interval History:     No cough, fevers / chills. No SOA.  Denies palpitations. Pain well controlled        Objective     Vital Signs  /62   Pulse 60   Temp 98.4 °F (36.9 °C) (Oral)   Resp 18   Ht 177.8 cm (70\")   Wt 109 kg (240 lb)   SpO2 97%   BMI 34.44 kg/m²   Intake & Output (last day)       05/02 0701 - 05/03 0700 05/03 0701 - 05/04 0700    P.O. 960 460    Total Intake(mL/kg) 960 (8.8) 460 (4.2)    Urine (mL/kg/hr) 0 (0)     Wound 525 (0.2)     Total Output 525      Net +435 +460          Unmeasured Urine Occurrence 6 x 2 x            Physical Exam:     General Appearance:    Alert, cooperative, in no acute distress   Head:    Normocephalic, without obvious abnormality, atraumatic   Eyes:            Lids and lashes normal, conjunctivae and sclerae normal, no   icterus, no pallor, corneas clear, PERRLA       Throat:   No oral lesions, no thrush, oral mucosa moist   Neck:   No adenopathy, supple, trachea midline, no thyromegaly, no   carotid bruit, no JVD   Lungs:     Clear to auscultation,respirations regular, even and                  unlabored    Heart:    Regular rhythm and normal rate, normal S1 and S2, no            murmur, no gallop, no rub, no click   Chest Wall:    No abnormalities observed   Abdomen:     Normal bowel sounds, no masses, no organomegaly, soft        non-tender, non-distended, no guarding, no rebound                tenderness   Extremities:   no edema, no cyanosis, no redness.  Right lower extremity with wound VAC                         Results Review:    Lab Results   Component Value Date    WBC 8.92 05/01/2018    HGB 7.4 (L) 05/01/2018    HCT 24.8 (L) 05/01/2018    MCV 87.0 05/01/2018     05/01/2018       Lab Results   Component Value Date    GLUCOSE 102 05/01/2018    BUN 24 (H) 05/01/2018    CREATININE 1.28 05/01/2018    EGFRIFNONA 54 (L) 05/01/2018    BCR 18.8 05/01/2018     (L) " 05/01/2018    K 4.2 05/01/2018     05/01/2018    CO2 26.2 05/01/2018    CALCIUM 8.1 05/01/2018    ALBUMIN 2.60 (L) 04/25/2018    LABIL2 0.7 (L) 04/25/2018    AST 39 (H) 04/25/2018    ALT 27 04/25/2018     Lab Results   Component Value Date    INR 1.28 (H) 01/03/2018    INR 1.93 (H) 04/25/2017       No results found for: POCGLU       Medication Review:     Current Facility-Administered Medications:   •  acetaminophen (TYLENOL) tablet 650 mg, 650 mg, Oral, Q4H PRN, Samuel Duane Kreis, MD, 650 mg at 04/24/18 2124  •  albuterol (PROVENTIL) nebulizer solution 0.083% 2.5 mg/3mL, 2.5 mg, Nebulization, Q6H PRN, Samuel Duane Kreis, MD, 2.5 mg at 05/03/18 0737  •  alfuzosin (UROXATRAL) 24 hr tablet 10 mg, 10 mg, Oral, Daily, Samuel Duane Kreis, MD, 10 mg at 05/03/18 0920  •  apixaban (ELIQUIS) tablet 5 mg, 5 mg, Oral, Q12H, Bobbi Moses, [facility], 5 mg at 05/03/18 0920  •  bisacodyl (DULCOLAX) suppository 10 mg, 10 mg, Rectal, Daily PRN, Samuel Duane Kreis, MD  •  carboxymethylcellulose (REFRESH PLUS) 0.5 % ophthalmic solution 2 drop, 2 drop, Both Eyes, TID PRN, Samuel Duane Kreis, MD, 2 drop at 05/02/18 2046  •  carvedilol (COREG) tablet 12.5 mg, 12.5 mg, Oral, Q12H, Samuel Duane Kreis, MD, 12.5 mg at 05/03/18 0920  •  cholecalciferol (VITAMIN D3) tablet 1,000 Units, 1,000 Units, Oral, Daily, Samuel Duane Kreis, MD, 1,000 Units at 05/03/18 0920  •  fluticasone (FLONASE) 50 MCG/ACT nasal spray 2 spray, 2 spray, Each Nare, Daily, Samuel Duane Kreis, MD, 2 spray at 05/03/18 0919  •  furosemide (LASIX) tablet 40 mg, 40 mg, Oral, Daily, Samuel Duane Kreis, MD, 40 mg at 05/03/18 0920  •  guaiFENesin (MUCINEX) 12 hr tablet 600 mg, 600 mg, Oral, Q12H, Samuel Duane Kreis, MD, 600 mg at 05/03/18 0920  •  ketotifen (ZADITOR) 0.025 % ophthalmic solution 1 drop, 1 drop, Both Eyes, BID, Samuel Duane Kreis, MD, 1 drop at 05/03/18 0919  •  levothyroxine (SYNTHROID, LEVOTHROID) tablet 150 mcg, 150 mcg, Oral, Q AM, Samuel Duane Kreis, MD,  150 mcg at 05/03/18 0546  •  magnesium hydroxide (MILK OF MAGNESIA) suspension 2400 mg/10mL 10 mL, 10 mL, Oral, Daily PRN, Samuel Duane Kreis, MD, 10 mL at 04/30/18 0531  •  meclizine (ANTIVERT) tablet 12.5 mg, 12.5 mg, Oral, BID, Samuel Duane Kreis, MD, 12.5 mg at 05/03/18 0924  •  mometasone-formoterol (DULERA 100) inhaler 2 puff, 2 puff, Inhalation, BID, Samuel Duane Kreis, MD, 2 puff at 05/03/18 0736  •  NIFEdipine CC (ADALAT CC) 24 hr tablet 30 mg, 30 mg, Oral, Q24H, Samuel Duane Kreis, MD, 30 mg at 05/03/18 0920  •  ondansetron (ZOFRAN) tablet 4 mg, 4 mg, Oral, Q6H PRN **OR** ondansetron ODT (ZOFRAN-ODT) disintegrating tablet 4 mg, 4 mg, Oral, Q6H PRN **OR** ondansetron (ZOFRAN) injection 4 mg, 4 mg, Intravenous, Q6H PRN, Samuel Duane Kreis, MD  •  oxyCODONE (ROXICODONE) immediate release tablet 5 mg, 5 mg, Oral, Q6H PRN, Samuel Duane Kreis, MD, 5 mg at 05/03/18 0919  •  pantoprazole (PROTONIX) EC tablet 40 mg, 40 mg, Oral, BID AC, Samuel Duane Kreis, MD, 40 mg at 05/03/18 0920  •  polyethylene glycol 3350 powder (packet), 17 g, Oral, Daily PRN, Samuel Duane Kreis, MD  •  potassium chloride (K-DUR,KLOR-CON) ER tablet 10 mEq, 10 mEq, Oral, Daily, Samuel Duane Kreis, MD, 10 mEq at 05/03/18 0924  •  probenecid (BENEMID) tablet 500 mg, 500 mg, Oral, Daily, Samuel Duane Kreis, MD, 500 mg at 05/03/18 0920  •  vitamin B-12 (CYANOCOBALAMIN) tablet 1,000 mcg, 1,000 mcg, Oral, Daily, Samuel Duane Kreis, MD, 1,000 mcg at 05/03/18 0920      Assessment/Plan     Debility  Pulmonary embolism  Right lower extremity DVT with right lower extremity hematoma status post evacuation now with wound VAC  Hyponatremia  COPD  Hypertension  Chronic diastolic congestive heart failure  Chronic kidney disease stage III  Anemia with heme positive stools       Plan:  PT and OT ongoing     Full dose anticoagulation with Eliquis     He does have hypertension.  Continue nifedipine, carvedilol and monitor closely.      Continue Dulera twice a  day    Stool was positive for occult blood but he has no obvious bleeding and hemoglobin is been relatively stable.  Withholding anticoagulation with acute right lower extremity DVT would seem to be riskier than continue with anticoagulation with close monitoring his hemoglobin        Samuel Duane Kreis, MD  05/03/18  3:33 PM

## 2018-05-03 NOTE — PROGRESS NOTES
Inpatient Rehabilitation Functional Measures Assessment    Functional Measures  DERREK Eating:  DERREK Grooming:  DERREK Bathing:  DERREK Upper Body Dressing:  DERREK Lower Body Dressing:  DERREK Toileting:    DERREK Bladder Management  Level of Assistance:  Frequency/Number of Accidents this Shift:    DERREK Bowel Management  Level of Assistance:  Frequency/Number of Accidents this Shift:    DERREK Bed/Chair/Wheelchair Transfer:  DERREK Toilet Transfer:  DERREK Tub/Shower Transfer:    Previously Documented Mode of Locomotion at Discharge:  DERREK Expected Mode of Locomotion at Discharge:  DERREK Walk/Wheelchair:  DERREK Stairs:    DERREK Comprehension:  Auditory comprehension is the usual mode. Comprehension  Score = 6, Modified [place].  Patient comprehends complex/abstract  information in their primary language, requiring:  DERREK Expression:  Vocal expression is the usual mode. Expression Score = 6,  Modified Independent.  Patient expresses complex/abstract information in their  primary language, requiring:  DERREK Social Interaction:  Social Interaction Score = 6, Modified Independent.  Patient is modified independent for social interaction, requiring:  DERREK Problem Solving:  Patient does not make appropriate decisions in order to  solve complex problems without assistance from a helper. Problem Solving Score =  4, Minimal Direction. Patient makes appropriate decisions in order to solve  routine problems 75-90% of the time. Patient requires minimal/occasional  direction for the following behavior(s):  DERREK Memory:  Memory Score = 5, Supervision.  Patient recognizes and remembers  with prompting only under stressful or unfamiliar conditions, but no more than  10% of the time, for the following behavior(s):    Therapy Mode Minutes  Occupational Therapy:  Physical Therapy:  Speech Language Pathology:    Discharge Functional Goals:    Signed by: Edel Rodriguez Nurse

## 2018-05-03 NOTE — PROGRESS NOTES
Inpatient Rehabilitation Functional Measures Assessment    Functional Measures  DERREK Eating:  DERREK Grooming:  DERREK Bathing:  DERREK Upper Body Dressing:  DERREK Lower Body Dressing:  DERREK Toileting:    DERREK Bladder Management  Level of Assistance:  Frequency/Number of Accidents this Shift:    DERREK Bowel Management  Level of Assistance:  Frequency/Number of Accidents this Shift:    DERREK Bed/Chair/Wheelchair Transfer:  Bed/chair/wheelchair Transfer Score = 4.  Patient performs 75% or more of effort and minimal assistance (little/incidental  help/lifting of one limb/steadying) for transferring to and from the  bed/chair/wheelchair, requiring: Patient requires the following assistive  device(s): Walker. Arm rest. Bed rails.  DERREK Toilet Transfer:  Toilet Transfer Score = 5.  Patient is supervision/set-up  for transferring to and from the toilet/commode, requiring: Stand by assistance.  Patient requires the following assistive device(s): Safety frame/over the  toilet. Walker. Grab bars.  DERREK Tub/Shower Transfer:    Previously Documented Mode of Locomotion at Discharge:  DERREK Expected Mode of Locomotion at Discharge:  DERREK Walk/Wheelchair:  WHEELCHAIR OBSERVATION   Activity was not observed.    WALK OBSERVATION   Walk Distance Scale = 2.  Distance walked is 50 -149 feet. Walk Score = 2.  Patient is able to walk at least 50 feet (household distance) but requires  assistance. Patient walked a distance of 85 feet. Patient requires the following  assistive device(s): Rolling walker.  DERREK Stairs:  Activity was not observed.    DERREK Comprehension:  DERREK Expression:  DERREK Social Interaction:  DERREK Problem Solving:  DERREK Memory:    Therapy Mode Minutes  Occupational Therapy:  Physical Therapy: Individual: 90 minutes.  Speech Language Pathology:    Discharge Functional Goals:    Signed by: Catie Cooper PTA

## 2018-05-03 NOTE — PROGRESS NOTES
Inpatient Rehabilitation Functional Measures Assessment    Functional Measures  DERREK Eating:  DERREK Grooming: Patient requires moderate assistance for washing, rinsing and  drying the face. Patient requires moderate assistance for washing, rinsing and  drying the hands. Patient requires no physical assistance for brushing teeth.  Patient requires total assistance for brushing/combing hair. Patient requires  total assistance for shaving or applying makeup. Patient performs 20 -  24% of  grooming tasks.  Grooming Score = 1, Total Assistance. No assistive devices were  required.  DERREK Bathing:  Patient bathed in bed. Patient requires moderate assistance for  washing, rinsing, or drying the right arm. Patient requires moderate assistance  for washing, rinsing, or drying the left arm. Patient requires total assistance  for washing, rinsing, or drying the chest. Patient requires total assistance for  washing, rinsing, or drying the abdomen. Patient requires total assistance for  washing, rinsing, or drying the perineal area. Patient requires total assistance  for washing, rinsing, or drying the buttocks. Patient requires total assistance  for washing, rinsing, or drying the right upper leg. Patient requires total  assistance for washing, rinsing, or drying the left upper leg. Patient requires  total assistance for washing, rinsing, or drying the right lower leg, including  the foot. Patient requires total assistance for washing, rinsing, or drying the  left lower leg, including the foot. Patient performs 0 -  24% of bathing tasks.  Bathing Score = 1, Total Assistance. No assistive devices were required.  DERREK Upper Body Dressing:  Patient requires total assistance for gathering  clothes. Wearing a bra or undershirt was not applicable for this patient.  Patient requires moderate/considerable physical assistance for threading the  right arm through the garment (shirt/sweater). Patient requires  moderate/considerable physical  assistance for threading the left arm through the  garment (shirt/sweater). Patient requires total assistance for holding clothing  and/or pulling an over-head-garment over head or pulling front-fastening-garment  around back. Patient requires total assistance for holding clothing and/or  pulling an over-head-garment down the trunk or adjusting/fastening together a  front-fastening-garment. Patient performs 40 % of upper body dressing tasks.  Upper Body Dressing Score = 2, Maximal Assistance. No assistive devices were  required.  DERREK Lower Body Dressing:  Patient requires total assistance for gathering  clothes. Patient requires total assistance for holding clothing and/or threading  the right leg through the undergarment. Patient requires no physical assistance  for donning and/or doffing undergarment threading left leg. Patient requires  total assistance for holding clothing and/or pulling undergarment over hips and  adjusting fasteners. Patient requires total assistance for holding clothing  and/or threading the right leg through the pants/skirt. Patient requires total  assistance for holding clothing and/or threading the left leg through the  pants/skirt. Patient requires total assistance for holding clothing and/or  pulling pants/skirt over hips and adjusting fasteners. Patient requires total  assistance for holding clothing and/or donning and/or doffing right sock.  Patient requires total assistance for holding clothing and/or donning and/or  doffing left sock. Donning and/or doffing right shoe was not observed for this  patient. Donning and/or doffing left shoe was not observed for this patient.  Patient performs 11.11 -  24% of lower body dressing tasks. Lower Body Dressing  Score = 1, Total Assistance. No assistive devices were required.  DERREK Toileting:    DERREK Bladder Management  Level of Assistance:  Bladder Score = 5.  Patient is supervision/set-up for  bladder management, requiring: Setting out  equipment. Emptying equipment.  Patient requires the following assistive device(s):  Urinal.  Frequency/Number of Accidents this Shift:  Bladder accidents this shift:  0 .  Patient has not had an accident, but used a device/medication this shift  requiring: urinal .    DERREK Bowel Management  Level of Assistance:  Frequency/Number of Accidents this Shift:    DERREK Bed/Chair/Wheelchair Transfer:  [facility] Toilet Transfer:  Toilet Transfer was not observed this shift because  patient used bedpan/urinal only.  DERREK Tub/Shower Transfer:    Previously Documented Mode of Locomotion at Discharge:  [facility] Expected Mode of Locomotion at Discharge:  [facility] Walk/Wheelchair:  [facility] Stairs:    DERREK Comprehension:  DERREK Expression:  [facility] Social Interaction:  [facility] Problem Solving:  [facility] Memory:    Therapy Mode Minutes  Occupational Therapy:  Physical Therapy:  Speech Language Pathology:    Discharge Functional Goals:    Signed by: JENI Burns

## 2018-05-04 PROCEDURE — 97535 SELF CARE MNGMENT TRAINING: CPT | Performed by: OCCUPATIONAL THERAPIST

## 2018-05-04 PROCEDURE — 97535 SELF CARE MNGMENT TRAINING: CPT

## 2018-05-04 PROCEDURE — 94799 UNLISTED PULMONARY SVC/PX: CPT

## 2018-05-04 PROCEDURE — 97530 THERAPEUTIC ACTIVITIES: CPT

## 2018-05-04 PROCEDURE — 97530 THERAPEUTIC ACTIVITIES: CPT | Performed by: OCCUPATIONAL THERAPIST

## 2018-05-04 PROCEDURE — 97110 THERAPEUTIC EXERCISES: CPT

## 2018-05-04 PROCEDURE — 97116 GAIT TRAINING THERAPY: CPT

## 2018-05-04 RX ORDER — ALBUTEROL SULFATE 2.5 MG/3ML
2.5 SOLUTION RESPIRATORY (INHALATION)
Status: DISCONTINUED | OUTPATIENT
Start: 2018-05-04 | End: 2018-05-08 | Stop reason: HOSPADM

## 2018-05-04 RX ADMIN — POTASSIUM CHLORIDE 10 MEQ: 750 TABLET, FILM COATED, EXTENDED RELEASE ORAL at 08:55

## 2018-05-04 RX ADMIN — ALFUZOSIN HYDROCHLORIDE 10 MG: 10 TABLET, EXTENDED RELEASE ORAL at 08:55

## 2018-05-04 RX ADMIN — APIXABAN 5 MG: 5 TABLET, FILM COATED ORAL at 21:17

## 2018-05-04 RX ADMIN — MECLIZINE HCL 12.5 MG: 12.5 TABLET ORAL at 08:55

## 2018-05-04 RX ADMIN — NIFEDIPINE 30 MG: 30 TABLET, EXTENDED RELEASE ORAL at 08:55

## 2018-05-04 RX ADMIN — GUAIFENESIN 600 MG: 600 TABLET, EXTENDED RELEASE ORAL at 08:55

## 2018-05-04 RX ADMIN — CHOLECALCIFEROL TAB 10 MCG (400 UNIT) 1000 UNITS: 10 TAB at 08:56

## 2018-05-04 RX ADMIN — CARBOXYMETHYLCELLULOSE SODIUM 2 DROP: 5 SOLUTION/ DROPS OPHTHALMIC at 21:17

## 2018-05-04 RX ADMIN — Medication 1000 MCG: at 08:56

## 2018-05-04 RX ADMIN — CARVEDILOL 12.5 MG: 6.25 TABLET, FILM COATED ORAL at 21:17

## 2018-05-04 RX ADMIN — CARBOXYMETHYLCELLULOSE SODIUM 2 DROP: 5 SOLUTION/ DROPS OPHTHALMIC at 08:55

## 2018-05-04 RX ADMIN — GUAIFENESIN 600 MG: 600 TABLET, EXTENDED RELEASE ORAL at 21:17

## 2018-05-04 RX ADMIN — OXYCODONE HYDROCHLORIDE 5 MG: 5 TABLET ORAL at 06:30

## 2018-05-04 RX ADMIN — OXYCODONE HYDROCHLORIDE 5 MG: 5 TABLET ORAL at 21:18

## 2018-05-04 RX ADMIN — KETOTIFEN FUMARATE 1 DROP: 0.35 SOLUTION/ DROPS OPHTHALMIC at 08:55

## 2018-05-04 RX ADMIN — PANTOPRAZOLE SODIUM 40 MG: 40 TABLET, DELAYED RELEASE ORAL at 17:03

## 2018-05-04 RX ADMIN — PANTOPRAZOLE SODIUM 40 MG: 40 TABLET, DELAYED RELEASE ORAL at 08:55

## 2018-05-04 RX ADMIN — ALBUTEROL SULFATE 2.5 MG: 2.5 SOLUTION RESPIRATORY (INHALATION) at 19:38

## 2018-05-04 RX ADMIN — ALBUTEROL SULFATE 2.5 MG: 2.5 SOLUTION RESPIRATORY (INHALATION) at 11:37

## 2018-05-04 RX ADMIN — MECLIZINE HCL 12.5 MG: 12.5 TABLET ORAL at 22:19

## 2018-05-04 RX ADMIN — KETOTIFEN FUMARATE 1 DROP: 0.35 SOLUTION/ DROPS OPHTHALMIC at 21:17

## 2018-05-04 RX ADMIN — PROBENECID 500 MG: 500 TABLET, FILM COATED ORAL at 08:55

## 2018-05-04 RX ADMIN — APIXABAN 5 MG: 5 TABLET, FILM COATED ORAL at 08:56

## 2018-05-04 RX ADMIN — LEVOTHYROXINE SODIUM 150 MCG: 150 TABLET ORAL at 06:30

## 2018-05-04 RX ADMIN — CARVEDILOL 12.5 MG: 6.25 TABLET, FILM COATED ORAL at 08:56

## 2018-05-04 RX ADMIN — FUROSEMIDE 40 MG: 40 TABLET ORAL at 08:55

## 2018-05-04 NOTE — PROGRESS NOTES
Inpatient Rehabilitation Functional Measures Assessment    Functional Measures  DERREK Eating:  Eating Score = 5. Patient is supervision/set-up for eating,  requiring: Opening containers. No assistive devices were required.  DERREK Grooming: Activity was not observed.  DERREK Bathing:  Activity was not observed.  DERREK Upper Body Dressing:  Activity was not observed.  DERREK Lower Body Dressing:  Activity was not observed.  DERREK Toileting:  Patient requires moderate assistance for adjusting clothing  before using a toilet, commode, bedpan, or urinal. Patient requires moderate  assistance for hygiene. Patient requires moderate assistance for adjusting  clothing after using a toilet, commode, bedpan, or urinal. Patient performs 0 -  24% of toileting tasks.  Toileting Score = 1, Total Assistance. No assistive  devices were required.    DERREK Bladder Management  Level of Assistance:  Bladder Score = 5.  Patient is supervision/set-up for  bladder management, requiring: Setting out equipment. Emptying equipment.  Patient requires the following assistive device(s):  Urinal.  Frequency/Number of Accidents this Shift:  Bladder accidents this shift:  0 .  Patient has not had an accident this shift.    DERREK Bowel Management  Level of Assistance: Activity was not observed.  Frequency/Number of Accidents this Shift: Bowel accidents this shift: 0 .  Patient has not had an accident this shift.    DERREK Bed/Chair/Wheelchair Transfer:  Bed/chair/wheelchair Transfer Score = 3.  Patient performs 50-74% of effort and requires moderate assistance (some  lifting) for transferring to and from the bed/chair/wheelchair. Patient requires  the following assistive device(s): Elevated head of bed. Elevated bed/surface.    DERREK Toilet Transfer:  Toilet Transfer Score = 3.  Patient performs 50-74% of  effort and requires moderate assistance (some lifting) for transferring to and  from the toilet/commode. Patient requires the following assistive device(s):  Grab bars.  Safety frame/over the toilet. Specialized seat.  DERREK Tub/Shower Transfer:  Activity was not observed.    Previously Documented Mode of Locomotion at Discharge:  DERREK Expected Mode of Locomotion at Discharge:  DERREK Walk/Wheelchair:  DERREK Stairs:    DERREK Comprehension:  DERREK Expression:  DERREK Social Interaction:  DERREK Problem Solving:  DERREK Memory:    Therapy Mode Minutes  Occupational Therapy:  Physical Therapy:  Speech Language Pathology:    Discharge Functional Goals:    Signed by: JENI Noel

## 2018-05-04 NOTE — PLAN OF CARE
Problem: Patient Care Overview  Goal: Plan of Care Review   05/04/18 1517   Patient Care Overview   IRF Plan of Care Review progress ongoing, continue   Progress, Functional Goals demonstrating adequate progress   Coping/Psychosocial   Plan of Care Reviewed With patient   OTHER   Outcome Summary Patient continues to require continued skilled care per POC for further improved activity tolerance and LE strengthening;

## 2018-05-04 NOTE — PROGRESS NOTES
Inpatient Rehabilitation Functional Measures Assessment    Functional Measures  DERREK Eating:  DERREK Grooming:  DERREK Bathing:  DERREK Upper Body Dressing:  DERREK Lower Body Dressing:  DERREK Toileting:  Activity was not observed.    DERREK Bladder Management  Level of Assistance:  Bladder Score = 5.  Patient is supervision/set-up for  bladder management, requiring: Setting out equipment. Emptying equipment.  Patient requires the following assistive device(s):  Urinal.  Frequency/Number of Accidents this Shift:  Bladder accidents this shift:  2 .    DERREK Bowel Management  Level of Assistance: Activity was not observed.  Frequency/Number of Accidents this Shift:    DERREK Bed/Chair/Wheelchair Transfer:  Activity was not observed.  DERREK Toilet Transfer:  Toilet Transfer was not observed this shift because  patient used bedpan/urinal only.  DERREK Tub/Shower Transfer:  Activity was not observed.    Previously Documented Mode of Locomotion at Discharge:  [facility] Expected Mode of Locomotion at Discharge:  DERREK Walk/Wheelchair:  DERREK Stairs:    DERREK Comprehension:  DERREK Expression:  DERREK Social Interaction:  DERREK Problem Solving:  DERREK Memory:    Therapy Mode Minutes  Occupational Therapy:  Physical Therapy:  Speech Language Pathology:    Discharge Functional Goals:    Signed by: JENI Lemus

## 2018-05-04 NOTE — PLAN OF CARE
Problem: Patient Care Overview  Goal: Plan of Care Review  Outcome: Ongoing (interventions implemented as appropriate)      Problem: Fall Risk (Adult)  Goal: Absence of Fall  Outcome: Ongoing (interventions implemented as appropriate)      Problem: Skin Injury Risk (Adult)  Goal: Skin Health and Integrity  Outcome: Ongoing (interventions implemented as appropriate)      Problem: Functional Mobility Impairment (IRF) (Adult)  Goal: Optimal/Safe Level of [place] with Mobility  Outcome: Ongoing (interventions implemented as appropriate)      Problem: Wound (Includes Pressure Injury) (Adult)  Goal: Signs and Symptoms of Listed Potential Problems Will be Absent, Minimized or Managed (Wound)  Outcome: Ongoing (interventions implemented as appropriate)

## 2018-05-04 NOTE — THERAPY TREATMENT NOTE
Inpatient Rehabilitation - Physical Therapy Treatment Note   Oscar     Patient Name: Carlos Cee  : 1934  MRN: [id number]    Today's Date: 2018                 Admit Date: 2018      Visit Dx:    No diagnosis found.    Patient Active Problem List   Diagnosis   • Debility       Therapy Treatment          IRF Treatment Summary     Row Name 18 1512 18 1507 18 1300       Evaluation/Treatment Time and Intent    Document Type therapy note (daily note)  -RF therapy note (daily note)  -AB therapy note (daily note)  -    Mode of Treatment individual therapy;physical therapy  -RF occupational therapy  -AB  --    Patient/Family Observations Pt agreeable to treatment session with c/o erendira.   -RF Pt. agreeable to therapy, no complaints  -AB pt agreeable to therapy  -AH    Recorded by [RF] Catie Cooper PTA [AB] Tiffanie Bell OT [AH] Kerry Dougherty OT    Row Name 18 15118 1507 18 1300       Cognition/Psychosocial- PT/OT    Affect/Mental Status (Cognitive) WFL  -RF WFL  -AB WFL  -AH    Orientation Status (Cognition) oriented x 3  -RF  --  --    Follows Commands (Cognition) WFL  -RF WFL  -AB  --    Recorded by [RF] Catie Cooper PTA [AB] Tiffaine Bell OT [AH] Kerry Dougherty OT    Row Name 18 1512             Mobility    Extremity Weight-bearing Status left lower extremity;right lower extremity  -RF      Left Lower Extremity (Weight-bearing Status) weight-bearing as tolerated (WBAT)  -RF      Right Lower Extremity (Weight-bearing Status) weight-bearing as tolerated (WBAT)  -RF      Recorded by [RF] Catie Cooper PTA      Row Name 18 1512 18 1507 18 1300       Transfer Assessment/Treatment    Transfer Assessment/Treatment bed-chair transfer;chair-bed transfer;sit-stand transfer;stand-sit transfer;stand pivot/stand step transfer  -RF  --  --    Bed-Chair [place] (Transfers) verbal cues;nonverbal  cues (demo/gesture);contact guard  -RF  -- verbal cues;contact guard  -AH    Chair-Bed [place] (Transfers) verbal cues;nonverbal cues (demo/gesture);contact guard  -RF contact guard;verbal cues  -AB  --    Assistive Device (Bed-Chair Transfers) walker, front-wheeled  -RF  --  --    Sit-Stand [place] (Transfers) verbal cues;nonverbal cues (demo/gesture);contact guard  -RF  --  --    Stand-Sit [place] (Transfers) verbal cues;nonverbal cues (demo/gesture);contact guard  -RF  --  --    Recorded by [RF] Catie Cooper PTA [AB] Tiffanie Bell, OT [AH] Kerry Dougherty, OT    Row Name 05/04/18 1512 05/04/18 1507          Chair-Bed Transfer    Assistive Device (Chair-Bed Transfers) walker, front-wheeled  -RF walker, front-wheeled;wheelchair  -AB     Recorded by [RF] Catie Cooper PTA [AB] Tiffanie Bell, OT     Row Name 05/04/18 1512             Sit-Stand Transfer    Assistive Device (Sit-Stand Transfers) walker, front-wheeled  -RF      Recorded by [RF] Catie Cooper PTA      Row Name 05/04/18 1512             Stand-Sit Transfer    Assistive Device (Stand-Sit Transfers) walker, front-wheeled  -RF      Recorded by [RF] Catie Cooper PTA      Row Name 05/04/18 1512             Stand Pivot/Stand Step Transfer    Stand Pivot/Stand Step [place] verbal cues;nonverbal cues (demo/gesture);contact guard  -RF      Assistive Device (Stand Pivot Stand Step Transfer) walker, front-wheeled  -RF      Recorded by [RF] Catie Cooper PTA      Row Name 05/04/18 1512             Gait/Stairs Assessment/Training    Gait/Stairs Assessment/Training gait/ambulation independence;gait/ambulation assistive device;distance ambulated;gait pattern;gait deviations  -RF      [place] Level (Gait) verbal cues;nonverbal cues (demo/gesture);contact guard  -RF      Assistive Device (Gait) walker, front-wheeled  -RF      Distance in Feet (Gait) 85  -RF      Pattern (Gait) step-to  -RF       Deviations/Abnormal Patterns (Gait) antalgic;andreea decreased;gait speed decreased;stride length decreased   R [place] shoe in place  -RF      Bilateral Gait Deviations forward flexed posture;heel strike decreased  -RF      Maintains Weight-bearing Status (Gait) able to maintain  -RF      Comment (Gait/Stairs) Patient self limiting with ambualtion distances.   -RF      Recorded by [RF] Catie Cooper PTA      Row Name 05/04/18 1512 05/04/18 1507          Safety Issues, Functional Mobility    Impairments Affecting Function (Mobility) balance;endurance/activity tolerance;pain;shortness of breath  -RF  --     Comment, Safety Issues/Impairments (Mobility) Precautions: fall risk, mult DVT, BPE, large incision R lower leg, [place]  -RF fall precautions, <skin integrity, wound vac RLE  -AB     Recorded by [RF] Catie Cooper, NAYANA [AB] Tiffanie Bell, OT     Row Name 05/04/18 1507             Grooming Assessment/Treatment    Grooming [place] Level set up  -AB      Grooming Position supported sitting;sink side  -AB      Recorded by [AB] Tiffanie Bell, OT      Row Name 05/04/18 1512             Sensory    Hearing Status hearing impairment, right  -RF      Recorded by [RF] Catie Cooper PTA      Row Name 05/04/18 1512             Vision Assessment/Intervention    Visual Impairment/Limitations WFL  -RF      Recorded by [RF] Catie Cooper, [facility]      Row Name 05/04/18 1512             Pain Scale: Numbers Pre/Post-Treatment    Pain Location - Side Right  -RF      Pain Location - Orientation lateral  -RF      Pain Location other (see comments)   lower leg/ wound site  -RF      Recorded by [RF] Catie Cooper PTA      Row Name 05/04/18 1512             Pain Scale: FACES Pre/Post-Treatment    Pain: FACES Scale, Pretreatment 4-->hurts little more  -RF      Pain: FACES Scale, Post-Treatment 4-->hurts little more  -RF      Recorded by [RF] Catie Cooper PTA      Row Name 05/04/18 1512              Balance    Balance static sitting balance;static standing balance;dynamic standing balance  -RF      Recorded by [RF] Catie Cooper, NAYANA      Row Name 05/04/18 1512             Dynamic Standing Balance    Level of [place], Reaches Outside Midline (Standing, Dynamic Balance) minimal assist, 75% patient effort;contact guard assist  -RF      Time Able to Maintain Position, Reaches Outside Midline (Standing, Dynamic Balance) 1 to 2 minutes  -RF      Comment, Reaches Outside Midline (Standing, Dynamic Balance) bag toss X2 trials  -RF      Recorded by [RF] Catie Cooper PTA      Row Name 05/04/18 1512             Edema Assessment & Management    Location (Edema) lower extremity, right  -RF      Recorded by [RF] Catie Cooper, [facility]      Row Name 05/04/18 1512             Edema Symptoms/Interventions    Description (Edema) diffuse;pitting  -RF      Recorded by [RF] Catie Cooper, NAYANA      Row Name 05/04/18 1512             Therapeutic Exercise    Therapeutic Exercise standing, lower extremities;seated, lower extremities  -RF      Recorded by [RF] Catie Cooper PTA      Row Name 05/04/18 1507 05/04/18 1300          Upper Extremity Seated Therapeutic Exercise    Device, Seated Upper Extremity (Therapeutic Exercise)  -- restorator/arm bike   4 min x3  -     Exercise Type, Seated Upper Extremity (Therapeutic Exercise) AROM (active range of motion);other (see comments)   BUE G/FMC TherEx/Act, strengthening, fxl activity ambika tasks  -AB AROM (active range of motion)   functional activity tolerance, bilateral coordination  -     Expected Outcomes, Seated Upper Extremity (Therapeutic Exercise) improve functional tolerance, self-care activity;improve performance, BADLs  -AB  --     Recorded by [AB] Tiffanie Bell, OT [AH] Kerry Dougherty, OT     Row Name 05/04/18 1512             Lower Extremity Seated Therapeutic Exercise    Performed, Seated Lower Extremity (Therapeutic Exercise) hip  flexion/extension;hip abduction/adduction;hip external/internal rotation;knee flexion/extension;ankle dorsiflexion/plantarflexion;LAQ (long arc quad), knee extension;other (see comments)   ball sqz, ham curl with RTB  -RF      Device, Seated Lower Extremity (Therapeutic Exercise) elastic bands/tubing;small ball;free weights, cuff   #2  -RF      Expected Outcomes, Seated Lower Extremity (Therapeutic Exercise) improve functional tolerance, community activity;improve functional tolerance, household activity;improve functional tolerance, self-care activity;improve performance, gait skills;strengthen, facilitate independent active range of motion  -RF      Sets/Reps Detail, Seated Lower Extremity (Therapeutic Exercise) 2X10  -RF      Recorded by [RF] Catie Cooper PTA      Row Name 05/04/18 1512             Lower Extremity Supine Therapeutic Exercise    Performed, Supine Lower Extremity (Therapeutic Exercise) hip flexion/extension;hip abduction/adduction;hip external/internal rotation;knee flexion/extension;ankle dorsiflexion/plantarflexion;SAQ (short arc quad) over bolster;SLR (straight leg raise);quadriceps sets;gluteal sets;ankle pumps;heel slides  -RF      Device, Supine Lower Extremity (Therapeutic Exercise) elastic bands/tubing;small ball;free weights, cuff   #2  -RF      Exercise Type, Supine Lower Extremity (Therapeutic Exercise) AROM (active range of motion);eccentric contraction;isotonic contraction, concentric  -RF      Expected Outcomes, Supine Lower Extremity (Therapeutic Exercise) improve functional tolerance, community activity;improve functional tolerance, household activity;improve functional tolerance, self-care activity;improve performance, gait skills;strengthen, facilitate independent active range of motion  -RF      Sets/Reps Detail, Supine Lower Extremity (Therapeutic Exercise) 2X10  -RF      Recorded by [RF] Catie Cooper PTA      Row Name 05/04/18 1512 05/04/18 1507          Positioning  and Restraints    Pre-Treatment Position in bed  -RF  --     Post Treatment Position bed  -RF bed  -AB     In Bed supine;call light within reach;encouraged to call for assist;exit alarm on  -RF supine;call light within reach;encouraged to call for assist;side rails up x2;RLE elevated   post second session  -AB     Recorded by [RF] Catie Cooper, PTA [AB] Tiffanie Bell, OT       User Key  (r) = Recorded By, (t) = Taken By, (c) = Cosigned By    Initials Name Effective Dates    AB Tiffanie Bell, OT 04/03/18 -     AH Kerry Dougherty, OT 04/03/18 -     RF Catie Cooper, NAYANA 03/07/18 -           Wound 04/23/18 1945 Right lateral calf surgical (Active)   Dressing Appearance dry;intact 5/4/2018  7:20 AM   Periwound intact;edematous 5/4/2018  7:20 AM   Periwound Temperature warm 5/4/2018  7:20 AM       NPWT (Negative Pressure Wound Therapy) 04/25/18 1600 Right Lateral Calf (Active)   Therapy Setting continuous therapy 5/4/2018  7:20 AM   Dressing foam, black 5/4/2018  7:20 AM   Pressure Setting 125 mmHg 5/4/2018  7:20 AM       Physical Therapy Education     Title: PT OT SLP Therapies (Done)     Topic: Physical Therapy (Done)     Point: Mobility training (Done)    Learning Progress Summary     Learner Status Readiness Method Response Comment Documented by    Patient Done Acceptance E VU  RF 05/04/18 1517     Done Acceptance E VU  RF 05/03/18 1537     Done Acceptance E,D VU,NR  AG 05/02/18 1753     Done Acceptance E VU  RF 05/01/18 1437     Done Acceptance E VU  RF 04/30/18 1611     Done Acceptance E VU,NR  BE 04/28/18 1340     Done Acceptance E VU  KB 04/27/18 1710     Done Acceptance E,D VU,NR  AG 04/26/18 1635     Done Acceptance E,D VU,NR  AG 04/25/18 1731          Point: Home exercise program (Done)    Learning Progress Summary     Learner Status Readiness Method Response Comment Documented by    Patient Done Acceptance E VU  RF 05/04/18 1517     Done Acceptance E VU  RF 05/03/18 1537     Done  Acceptance E,D VU,NR  AG 05/02/18 1753     Done Acceptance E VU  RF 05/01/18 1437     Done Acceptance E VU  RF 04/30/18 1611     Done Acceptance E VU,NR  BE 04/28/18 1340     Done Acceptance E VU  KB 04/27/18 1710     Done Acceptance E,D VU,NR  AG 04/26/18 1635     Done Acceptance E,D VU,NR  AG 04/25/18 1731          Point: Body mechanics (Done)    Learning Progress Summary     Learner Status Readiness Method Response Comment Documented by    Patient Done Acceptance E VU  RF 05/04/18 1517     Done Acceptance E VU  RF 05/03/18 1537     Done Acceptance E,D VU,NR   05/02/18 1753     Done Acceptance E VU  RF 05/01/18 1437     Done Acceptance E VU  RF 04/30/18 1611     Done Acceptance E VU,NR  BE 04/28/18 1340     Done Acceptance E VU  KB 04/27/18 1710     Done Acceptance E,D VU,NR   04/26/18 1635     Done Acceptance E,D VU,NR   04/25/18 1731          Point: Precautions (Done)    Learning Progress Summary     Learner Status Readiness Method Response Comment Documented by    Patient Done Acceptance E VU  RF 05/04/18 1517     Done Acceptance E VU  RF 05/03/18 1537     Done Acceptance E,D VU,NR   05/02/18 1753     Done Acceptance E VU  RF 05/01/18 1437     Done Acceptance E VU   04/30/18 1611     Done Acceptance E VU,NR  BE 04/28/18 1340     Done Acceptance E VU   04/27/18 1710     Done Acceptance E,D VU,NR   04/26/18 1635     Done Acceptance E,D VU,NR   04/25/18 1731                      User Key     Initials Effective Dates Name Provider Type Discipline     04/03/18 -  Agnieszka Cool, PT Physical Therapist PT    BE 04/03/18 -  Lilliana Hyde, PT Physical Therapist PT     03/07/18 -  Catie Cooper, PTA Physical Therapy Assistant PT     12/20/17 -  Kay Pena PTA Physical Therapy Assistant PT                  PT Recommendation and Plan         Plan of Care Reviewed With: patient  IRF Plan of Care Review: progress ongoing, continue  Progress, Functional Goals: demonstrating adequate  progress  Outcome Summary: Patient continues to require continued skilled care per POC for further improved activity tolerance and LE strengthening;                   Time Calculation:           PT Charges     Row Name 05/04/18 1517             Time Calculation    Start Time 0840  -RF      Stop Time 1010  -RF      Time Calculation (min) 90 min  -RF      PT Received On 05/04/18  -RF      PT - Next Appointment 05/07/18  -RF      PT Goal Re-Cert Due Date 05/09/18  -RF         Time Calculation- PT    Total Timed Code Minutes- PT 90 minute(s)  -RF        User Key  (r) = Recorded By, (t) = Taken By, (c) = Cosigned By    Initials Name Provider Type    RF Catie Cooper, PTA Physical Therapy Assistant            Therapy Charges for Today     Code Description Service Date Service Provider Modifiers Qty    21073731517 HC PT THER PROC EA 15 MIN 5/3/2018 Catie Cooper, PTA GP 3    25972736262 HC PT THERAPEUTIC ACT EA 15 MIN 5/3/2018 Catie Cooper, PTA GP 1    40762311097 HC GAIT TRAINING EA 15 MIN 5/3/2018 Catie Cooper, PTA GP 2    43605275525 HC PT THER SUPP EA 15 MIN 5/3/2018 Catie Cooper, PTA GP 2    95537998242 HC PT THER PROC EA 15 MIN 5/4/2018 Catie Cooper, PTA GP 4    75715588503 HC GAIT TRAINING EA 15 MIN 5/4/2018 Catie Cooper, PTA GP 2    26085571286 HC PT THER SUPP EA 15 MIN 5/4/2018 Catie Cooper, PTA GP 3                   Catie Cooper, PTA  5/4/2018

## 2018-05-04 NOTE — PROGRESS NOTES
Inpatient Rehabilitation Functional Measures Assessment    Functional Measures  DERREK Eating:  DERREK Grooming: Grooming Score = 5. Patient is supervision/set-up for grooming,  requiring: Setting out grooming equipment. No assistive devices were required.  DERREK Bathing:  DERREK Upper Body Dressing:  DERREK Lower Body Dressing:  DERREK Toileting:    DERREK Bladder Management  Level of Assistance:  Frequency/Number of Accidents this Shift:    DERREK Bowel Management  Level of Assistance:  Frequency/Number of Accidents this Shift:    DERREK Bed/Chair/Wheelchair Transfer:  Bed/chair/wheelchair Transfer Score = 4.  Patient performs 75% or more of effort and minimal assistance (little/incidental  help/lifting of one limb/steadying) for transferring to and from the  bed/chair/wheelchair, requiring: Contact guard. Patient requires the following  assistive device(s): Walker.  DERREK Toilet Transfer:  DERREK Tub/Shower Transfer:    Previously Documented Mode of Locomotion at Discharge:  [facility] Expected Mode of Locomotion at Discharge:  DERREK Walk/Wheelchair:  DERREK Stairs:    DERREK Comprehension:  DERREK Expression:  DERREK Social Interaction:  [facility] Problem Solving:  DERREK Memory:    Therapy Mode Minutes  Occupational Therapy: Individual: 45 minutes.  Physical Therapy:  Speech Language Pathology:    Discharge Functional Goals:    Signed by: Tiffanie Bell, Occupational Therapist

## 2018-05-04 NOTE — PROGRESS NOTES
Inpatient Rehabilitation Functional Measures Assessment    Functional Measures  DERREK Eating:  DERREK Grooming:  DERREK Bathing:  DERREK Upper Body Dressing:  DERREK Lower Body Dressing:  DERREK Toileting:    DERREK Bladder Management  Level of Assistance:  Frequency/Number of Accidents this Shift:    DERREK Bowel Management  Level of Assistance:  Frequency/Number of Accidents this Shift:    DERREK Bed/Chair/Wheelchair Transfer:  Bed/chair/wheelchair Transfer Score = 4.  Patient performs 75% or more of effort and minimal assistance (little/incidental  help/lifting of one limb/steadying) for transferring to and from the  bed/chair/wheelchair, requiring: Contact guard. Patient requires the following  assistive device(s): Walker. Bed rails. Arm rest.  DERREK Toilet Transfer:  Activity was not observed.  DERREK Tub/Shower Transfer:    Previously Documented Mode of Locomotion at Discharge:  DERREK Expected Mode of Locomotion at Discharge:  DERREK Walk/Wheelchair:  WHEELCHAIR OBSERVATION   Activity was not observed.    WALK OBSERVATION   Walk Distance Scale = 2.  Distance walked is 50 -149 feet. Walk Score = 2.  Patient performs 75% or more of effort and requires minimal assistance.  Incidental assistance, contact guard or steadying was provided. Patient walked a  distance of 85 feet. Patient requires the following assistive device(s): Rolling  walker. CGA  DERREK Stairs:  Activity was not observed.    DERREK Comprehension:  DERREK Expression:  DERREK Social Interaction:  DERREK Problem Solving:  DERREK Memory:    Therapy Mode Minutes  Occupational Therapy:  Physical Therapy: Individual: 90 minutes.  Speech Language Pathology:    Discharge Functional Goals:    Signed by: Catie Cooper PTA

## 2018-05-04 NOTE — PLAN OF CARE
Problem: Patient Care Overview  Goal: Plan of Care Review  Outcome: Ongoing (interventions implemented as appropriate)   05/04/18 9922   Patient Care Overview   IRF Plan of Care Review progress ongoing, continue   Progress, Functional Goals demonstrating adequate progress   Coping/Psychosocial   Plan of Care Reviewed With patient   OTHER   Outcome Summary Pt. w/ good tolerance and rest breaks provided. Continue w/ current POC.

## 2018-05-04 NOTE — PROGRESS NOTES
Case Management  Inpatient Rehabilitation Team Conference    Conference Date/Time: 5/1/2018 5:32:48 AM    Team Conference Attendees:  MD Winifred Gentile, NICOLE,   NICOLE Garcia, PT  Adelaida Velázquez, OT  NAOMI Negrete    Demographics            Age: 83Y            Gender: Male    Admission Date: 4/24/2018 7:04:00 PM  Rehabilitation Diagnosis:  debility  Comorbidities:      Plan of Care  Anticipated Discharge Date/Estimated Length of Stay: 5-8-18  Anticipated Discharge Destination: Community discharge with assistance  Discharge Plan : Pt plans to return home with fiancee assisting with care.  Medical Necessity Expected Level Rationale: good  Intensity and Duration: an average of 3 hours/5 days per week  Medical Supervision and 24 Hour Rehab Nursing: x  Physical Therapy: x  PT Intensity/Duration: PT 1.5 hours per day/5 days per week  Occupational Therapy: x  OT Intensity/Duration: OT 1.5 hours per day/5 days per week  Social Work: x  Therapeutic Recreation: x  Updated (if changes indicated)    Anticipated Discharge Date/Estimated Length of Stay:   5-8-18      Discharge Plan of Care:Home Health Services Physical Therapy strengthening,  endurance, gait training, transfer training, balance, therapeutic exercise, bed  mobility, home safety, ROM, steps/stairs 3x per week for 4 weeks Occupational  Therapy ADL re-training, home safety, functional mobility, strengthening 3x per  week for 4 weeks Aide bathing/personal care 3x per week for 4 weeks Nursing   wound care wound vac right lower extremity:  change dressing on Mondays,  Wednesdays and Fridays 3x/week for 4 weeks   Ambulatory: walker with wheels Commodes: Bedside commode    Based on the patient's medical and functional status, their prognosis and  expected level of functional improvement is: good      Interdisciplinary Problem/Goals/Status  Body Function Structure    [RN] Balance(Active)  Current Status(05/05/2018):  impaired physical mobility  Weekly Goal(06/02/2018): increase in physical activity  Discharge Goal: demonstrate use of adaptive equipment        Mobility    [PT] Bed/Chair/Wheelchair(Active)  Current Status(04/30/2018): Min/mod A with RW  Weekly Goal(05/07/2018): CGA with AAD  Discharge Goal: Mod Indep with AAD    [PT] Walk(Active)  Current Status(04/30/2018): 60' RW CGA  Weekly Goal(05/07/2018): 60 ft, AAD, CGA  Discharge Goal: 100 ft with RW/ AAD, SBA    [PT] Wheelchair(Active)  Current Status(04/30/2018): TBA  Weekly Goal(05/07/2018): 50 ft, Min A  Discharge Goal: 150 ft, SBA        Pain    [RN] Pain Management(Active)  Current Status(05/05/2018): impaired comfort r/t surgical wound  Weekly Goal(06/02/2018): state decrease of pain  Discharge Goal: identify measures to control pain        Safety    [RN] Potential for Injury(Active)  Current Status(04/24/2018): risk for injury r/t falls  Weekly Goal(06/02/2018): free from injury this shift  Discharge Goal: free from injury during hospital stay        Self Care    [OT] Bathing(Active)  Current Status(04/30/2018): MaxA/ModA  Weekly Goal(05/01/2018): ModA  Discharge Goal: Maurizio    [OT] Dressing (Lower)(Active)  Current Status(04/30/2018): TotalA  Weekly Goal(05/01/2018): MaxA  Discharge Goal: ModA    Comments: Pt plans to return home with fiancee assisting with care.    Signed by: NAOMI Negrete    Physician CoSigned By: Sourav Franklin 05/04/2018 11:47:25

## 2018-05-04 NOTE — PROGRESS NOTES
Rehabilitation Nursing  Inpatient Rehabilitation Functional Measures Assessment and Plan of Care    Plan of Care/Interventions  Copy from POC    Functional Measures  DERREK Eating:  DERREK Grooming:  DERREK Bathing:  DERREK Upper Body Dressing:  DERREK Lower Body Dressing:  DERREK Toileting:    DERREK Bladder Management  Level of Assistance:  Frequency/Number of Accidents this Shift:    DERREK Bowel Management  Level of Assistance:  Frequency/Number of Accidents this Shift:    DERREK Bed/Chair/Wheelchair Transfer:  DERREK Toilet Transfer:  DERREK Tub/Shower Transfer:    Previously Documented Mode of Locomotion at Discharge:  DERREK Expected Mode of Locomotion at Discharge:  DERREK Walk/Wheelchair:  DERREK Stairs:    DERREK Comprehension:  Auditory comprehension is the usual mode. Comprehension  Score = 6, Modified [place].  Patient comprehends complex/abstract  information in their primary language, requiring: Additional time.  DERREK Expression:  Vocal expression is the usual mode. Expression Score = 6,  Modified Independent.  Patient expresses complex/abstract information in their  primary language, requiring: Additional time.  DERREK Social Interaction:  Social Interaction Score = 6, Modified Independent.  Patient is modified independent for social interaction, requiring:  DERREK Problem Solving:  Problem Solving Score = 6, Modified [place].  Patient  makes appropriate decisions in order to solve complex problems, but requires  extra time.  DERREK Memory:  Memory Score = 6, Modified [place].  Patient is modified  independent for memory, requiring:    Signed by: Nurse Prudencio

## 2018-05-04 NOTE — PROGRESS NOTES
"     LOS: 10 days     Chief Complaint:  Right leg injury    Subjective     Interval History:     He states overall he is doing better.  He has no cough, fevers, chills.  No shortness of breath.  No black or bloody bowel movements.      Objective     Vital Signs  /54   Pulse 66   Temp 98.8 °F (37.1 °C) (Oral)   Resp 18   Ht 177.8 cm (70\")   Wt 109 kg (240 lb)   SpO2 96%   BMI 34.44 kg/m²   Intake & Output (last day)       05/03 0701 - 05/04 0700 05/04 0701 - 05/05 0700    P.O. 1480 360    Total Intake(mL/kg) 1480 (13.6) 360 (3.3)    Urine (mL/kg/hr)      Wound      Total Output        Net +1480 +360          Unmeasured Urine Occurrence 8 x 1 x            Physical Exam:     General Appearance:    Alert, cooperative, in no acute distress   Head:    Normocephalic, without obvious abnormality, atraumatic   Eyes:            Lids and lashes normal, conjunctivae and sclerae normal, no   icterus, no pallor, corneas clear, PERRLA       Throat:   No oral lesions, no thrush, oral mucosa moist   Neck:   No adenopathy, supple, trachea midline, no thyromegaly, no   carotid bruit, no JVD   Lungs:     Clear to auscultation,respirations regular, even and                  unlabored    Heart:    Regular rhythm and normal rate, normal S1 and S2, no            murmur, no gallop, no rub, no click   Chest Wall:    No abnormalities observed   Abdomen:     Normal bowel sounds, no masses, no organomegaly, soft        non-tender, non-distended, no guarding, no rebound                tenderness   Extremities:   no edema, no cyanosis, no redness.  Right lower extremity with wound VAC                         Results Review:    Lab Results   Component Value Date    WBC 8.92 05/01/2018    HGB 7.4 (L) 05/01/2018    HCT 24.8 (L) 05/01/2018    MCV 87.0 05/01/2018     05/01/2018       Lab Results   Component Value Date    GLUCOSE 102 05/01/2018    BUN 24 (H) 05/01/2018    CREATININE 1.28 05/01/2018    EGFRIFNONA 54 (L) 05/01/2018    " BCR 18.8 05/01/2018     (L) 05/01/2018    K 4.2 05/01/2018     05/01/2018    CO2 26.2 05/01/2018    CALCIUM 8.1 05/01/2018    ALBUMIN 2.60 (L) 04/25/2018    LABIL2 0.7 (L) 04/25/2018    AST 39 (H) 04/25/2018    ALT 27 04/25/2018     Lab Results   Component Value Date    INR 1.28 (H) 01/03/2018    INR 1.93 (H) 04/25/2017       No results found for: POCGLU       Medication Review:     Current Facility-Administered Medications:   •  acetaminophen (TYLENOL) tablet 650 mg, 650 mg, Oral, Q4H PRN, Samuel Duane Kreis, MD, 650 mg at 04/24/18 2124  •  albuterol (PROVENTIL) nebulizer solution 0.083% 2.5 mg/3mL, 2.5 mg, Nebulization, Q6H PRN, Samuel Duane Kreis, MD, 2.5 mg at 05/03/18 0737  •  alfuzosin (UROXATRAL) 24 hr tablet 10 mg, 10 mg, Oral, Daily, Samuel Duane Kreis, MD, 10 mg at 05/04/18 0855  •  apixaban (ELIQUIS) tablet 5 mg, 5 mg, Oral, Q12H, Bobbi Moses, [facility], 5 mg at 05/04/18 0856  •  bisacodyl (DULCOLAX) suppository 10 mg, 10 mg, Rectal, Daily PRN, Samuel Duane Kreis, MD  •  carboxymethylcellulose (REFRESH PLUS) 0.5 % ophthalmic solution 2 drop, 2 drop, Both Eyes, TID PRN, Samuel Duane Kreis, MD, 2 drop at 05/04/18 0855  •  carvedilol (COREG) tablet 12.5 mg, 12.5 mg, Oral, Q12H, Samuel Duane Kreis, MD, 12.5 mg at 05/04/18 0856  •  cholecalciferol (VITAMIN D3) tablet 1,000 Units, 1,000 Units, Oral, Daily, Samuel Duane Kreis, MD, 1,000 Units at 05/04/18 0856  •  fluticasone (FLONASE) 50 MCG/ACT nasal spray 2 spray, 2 spray, Each Nare, Daily, Samuel Duane Kreis, MD, 2 spray at 05/03/18 0919  •  furosemide (LASIX) tablet 40 mg, 40 mg, Oral, Daily, Samuel Duane Kreis, MD, 40 mg at 05/04/18 0855  •  guaiFENesin (MUCINEX) 12 hr tablet 600 mg, 600 mg, Oral, Q12H, Samuel Duane Kreis, MD, 600 mg at 05/04/18 0855  •  ketotifen (ZADITOR) 0.025 % ophthalmic solution 1 drop, 1 drop, Both Eyes, BID, Samuel Duane Kreis, MD, 1 drop at 05/04/18 0855  •  levothyroxine (SYNTHROID, LEVOTHROID) tablet 150 mcg, 150 mcg,  Oral, Q AM, Samuel Duane Kreis, MD, 150 mcg at 05/04/18 0630  •  magnesium hydroxide (MILK OF MAGNESIA) suspension 2400 mg/10mL 10 mL, 10 mL, Oral, Daily PRN, Samuel Duane Kreis, MD, 10 mL at 04/30/18 0531  •  meclizine (ANTIVERT) tablet 12.5 mg, 12.5 mg, Oral, BID, Samuel Duane Kreis, MD, 12.5 mg at 05/04/18 0855  •  mometasone-formoterol (DULERA 100) inhaler 2 puff, 2 puff, Inhalation, BID, Samuel Duane Kreis, MD, 2 puff at 05/04/18 0825  •  NIFEdipine CC (ADALAT CC) 24 hr tablet 30 mg, 30 mg, Oral, Q24H, Samuel Duane Kreis, MD, 30 mg at 05/04/18 0855  •  ondansetron (ZOFRAN) tablet 4 mg, 4 mg, Oral, Q6H PRN **OR** ondansetron ODT (ZOFRAN-ODT) disintegrating tablet 4 mg, 4 mg, Oral, Q6H PRN **OR** ondansetron (ZOFRAN) injection 4 mg, 4 mg, Intravenous, Q6H PRN, Samuel Duane Kreis, MD  •  oxyCODONE (ROXICODONE) immediate release tablet 5 mg, 5 mg, Oral, Q6H PRN, Samuel Duane Kreis, MD, 5 mg at 05/04/18 0630  •  pantoprazole (PROTONIX) EC tablet 40 mg, 40 mg, Oral, BID AC, Samuel Duane Kreis, MD, 40 mg at 05/04/18 0855  •  polyethylene glycol 3350 powder (packet), 17 g, Oral, Daily PRN, Samuel Duane Kreis, MD  •  potassium chloride (K-DUR,KLOR-CON) ER tablet 10 mEq, 10 mEq, Oral, Daily, Samuel Duane Kreis, MD, 10 mEq at 05/04/18 0855  •  probenecid (BENEMID) tablet 500 mg, 500 mg, Oral, Daily, Samuel Duane Kreis, MD, 500 mg at 05/04/18 0855  •  vitamin B-12 (CYANOCOBALAMIN) tablet 1,000 mcg, 1,000 mcg, Oral, Daily, Samuel Duane Kreis, MD, 1,000 mcg at 05/04/18 0856      Assessment/Plan     Debility  Pulmonary embolism  Right lower extremity DVT with right lower extremity hematoma status post evacuation now with wound VAC  Hyponatremia  COPD  Hypertension  Chronic diastolic congestive heart failure  Chronic kidney disease stage III  Anemia with heme positive stools       Plan:  PT and OT ongoing     Full dose anticoagulation with Eliquis     He does have hypertension.  Continue nifedipine, carvedilol and monitor  closely.      Continue Dulera twice a day    Stool was positive for occult blood but he has no obvious bleeding and hemoglobin is been relatively stable.  Withholding anticoagulation with acute right lower extremity DVT would seem to be riskier than continue with anticoagulation with close monitoring his hemoglobin    CBC in       Samuel Duane Kreis, MD  05/04/18  11:00 AM

## 2018-05-04 NOTE — PLAN OF CARE
Problem: Patient Care Overview  Goal: Individualization and Mutuality  Outcome: Ongoing (interventions implemented as appropriate)      Problem: Skin Injury Risk (Adult)  Goal: Skin Health and Integrity  Outcome: Ongoing (interventions implemented as appropriate)      Problem: Functional Mobility Impairment (IRF) (Adult)  Goal: Optimal/Safe Level of [place] with Mobility  Outcome: Ongoing (interventions implemented as appropriate)      Problem: BADL Skill Impairment (IRF) (Adult)  Goal: Optimal Functional [place] for BADLs (Performing or Directing)  Outcome: Ongoing (interventions implemented as appropriate)      Problem: Wound (Includes Pressure Injury) (Adult)  Goal: Signs and Symptoms of Listed Potential Problems Will be Absent, Minimized or Managed (Wound)  Outcome: Ongoing (interventions implemented as appropriate)

## 2018-05-04 NOTE — PROGRESS NOTES
Inpatient Rehabilitation Functional Measures Assessment    Functional Measures  DERREK Eating:  DERREK Grooming:  DERREK Bathing:  DERREK Upper Body Dressing:  DERREK Lower Body Dressing:  DERREK Toileting:    DERREK Bladder Management  Level of Assistance:  Frequency/Number of Accidents this Shift:    DERREK Bowel Management  Level of Assistance:  Frequency/Number of Accidents this Shift:    DERREK Bed/Chair/Wheelchair Transfer:  Bed/chair/wheelchair Transfer Score = 4.  Patient performs 75% or more of effort and minimal assistance (little/incidental  help/lifting of one limb/steadying) for transferring to and from the  bed/chair/wheelchair, requiring: No assistive devices were required.  DERREK Toilet Transfer:  DERREK Tub/Shower Transfer:    Previously Documented Mode of Locomotion at Discharge:  [facility] Expected Mode of Locomotion at Discharge:  [facility] Walk/Wheelchair:  [facility] Stairs:    [facility] Comprehension:  [facility] Expression:  [facility] Social Interaction:  [facility] Problem Solving:  [facility] Memory:    Therapy Mode Minutes  Occupational Therapy: Individual: 45 minutes.  Physical Therapy:  Speech Language Pathology:    Discharge Functional Goals:    Signed by: Maryellen Dougherty, Occupational Therapist

## 2018-05-04 NOTE — PROGRESS NOTES
Inpatient Rehabilitation - Occupational Therapy Treatment Note     [facility]     Patient Name: Carlos Cee  : 1934  MRN: [id number]    Today's Date: 2018                 Admit Date: 2018      Visit Dx:  No diagnosis found.    Patient Active Problem List   Diagnosis   • Debility         Therapy Treatment          IRF Treatment Summary     Row Name 18 1300             Evaluation/Treatment Time and Intent    Document Type therapy note (daily note)  -      Patient/Family Observations pt agreeable to therapy  -AH      Recorded by [] Kerry Dougherty OT      Row Name 18 1300             Cognition/Psychosocial- PT/OT    Affect/Mental Status (Cognitive) WFL  -AH      Recorded by [] Kerry Dougherty OT      Row Name 18 1300             Transfer Assessment/Treatment    Bed-Chair [place] (Transfers) verbal cues;contact guard  -      Recorded by [] Kerry Dougherty OT      Row Name 18 1300             Upper Extremity Seated Therapeutic Exercise    Device, Seated Upper Extremity (Therapeutic Exercise) restorator/arm bike   4 min x3  -      Exercise Type, Seated Upper Extremity (Therapeutic Exercise) AROM (active range of motion)   functional activity tolerance, bilateral coordination  -      Recorded by [] Kerry Dougherty OT        User Key  (r) = Recorded By, (t) = Taken By, (c) = Cosigned By    Initials Name Effective Dates     Kerry Dougherty OT 18 -           Wound 18 Right lateral calf surgical (Active)   Dressing Appearance dry;intact 2018  7:20 AM   Periwound intact;edematous 2018  7:20 AM   Periwound Temperature warm 2018  7:20 AM       NPWT (Negative Pressure Wound Therapy) 18 1600 Right Lateral Calf (Active)   Therapy Setting continuous therapy 2018  7:20 AM   Dressing foam, black 2018  7:20 AM   Pressure Setting 125 mmHg 2018  7:20 AM         OT Recommendation and Plan         Plan  of Care Review  Plan of Care Reviewed With: patient  Plan of Care Reviewed With: patient  IRF Plan of Care Review: progress ongoing, continue  Progress, Functional Goals: demonstrating adequate progress  Outcome Summary: patient tolerated therapy well with rest breaks provided          OT IRF GOALS     Row Name 05/02/18 1244 05/02/18 1242 04/25/18 1144       Transfer Goal 1 (OT-IRF)    Activity/Assistive Device (Transfer Goal 1, OT-IRF)  --  -- bed-to-chair/chair-to-bed;toilet  -AB    [place] Level (Transfer Goal 1, OT-IRF)  --  -- standby assist  -AB    Time Frame (Transfer Goal 1, OT-IRF)  --  -- long term goal (LTG);by discharge  -AB       Bathing Goal 1 (OT-IRF)    Activity/Device (Bathing Goal 1, OT-IRF) bathing skills, all  -AB  -- bathing skills, all  -AB    [place] Level (Bathing Goal 1, OT-IRF) moderate assist (50-74% patient effort);minimum assist (75% or more patient effort)  -AB  -- moderate assist (50-74% patient effort);verbal cues required  -AB    Time Frame (Bathing Goal 1, OT-IRF) short term goal (STG);5 - 7 days  -AB  -- short term goal (STG);5 - 7 days  -AB    Progress/Outcomes (Bathing Goal 1, OT-IRF)  -- goal met  -AB  --       Bathing Goal 2 (OT-IRF)    Activity/Device (Bathing Goal 2, OT-IRF)  --  -- bathing skills, all  -AB    [place] Level (Bathing Goal 2, OT-IRF)  --  -- minimum assist (75% or more patient effort);verbal cues required  -AB    Time Frame (Bathing Goal 2, OT-IRF)  --  -- long term goal (LTG);by discharge  -AB       LB Dressing Goal 1 (OT-IRF)    Activity/Device (LB Dressing Goal 1, OT-IRF) lower body dressing  -AB  -- lower body dressing  -AB    [place] (LB Dressing Goal 1, OT-IRF) maximum assist (25-49% patient effort);moderate assist (50-74% patient effort)  -AB  -- maximum assist (25-49% patient effort)  -AB    Time Frame (LB Dressing Goal 1, OT-IRF) short term goal (STG);5 - 7 days  -AB  -- short term goal (STG);5 - 7 days  -AB    Progress/Outcomes (LB  Dressing Goal 1, OT-IRF)  -- goal met  -AB  --       LB Dressing Goal 2 (OT-IRF)    Activity/Device (LB Dressing Goal 2, OT-IRF)  --  -- lower body dressing  -AB    [place] (LB Dressing Goal 2, OT-IRF)  --  -- moderate assist (50-74% patient effort)  -AB    Time Frame (LB Dressing Goal 2, OT-IRF)  --  -- long term goal (LTG);by discharge  -AB       Toileting Goal 1 (OT-IRF)    [place] Level (Toileting Goal 1, OT-IRF)  --  -- minimum assist (75% or more patient effort)  -AB    Time Frame (Toileting Goal 1, OT-IRF)  --  -- long term goal (LTG);by discharge  -AB      User Key  (r) = Recorded By, (t) = Taken By, (c) = Cosigned By    Initials Name Provider Type    AB Tiffanie Bell, OT Occupational Therapist                 Time Calculation:           Time Calculation- OT     Row Name 05/04/18 1346             Time Calculation-     OT Start Time 1245  -      OT Stop Time 1330  -      OT Time Calculation (min) 45 min  -        User Key  (r) = Recorded By, (t) = Taken By, (c) = Cosigned By    Initials Name Provider Type     Kerry Dougherty OT Occupational Therapist             Therapy Charges for Today     Code Description Service Date Service Provider Modifiers Qty    32106993735  OT SELF CARE/MGMT/TRAIN EA 15 MIN 5/4/2018 Kerry Dougherty OT GO 1    [id number]  OT THERAPEUTIC ACT EA 15 MIN 5/4/2018 Kerry Dougherty OT GO 2                   Kerry Dougherty OT  5/4/2018

## 2018-05-04 NOTE — THERAPY TREATMENT NOTE
Inpatient Rehabilitation - Occupational Therapy Treatment Note     [facility]     Patient Name: Carlos Cee  : 1934  MRN: [id number]    Today's Date: 2018                 Admit Date: 2018      Visit Dx:  No diagnosis found.    Patient Active Problem List   Diagnosis   • Debility         Therapy Treatment          IRF Treatment Summary     Row Name 18 1507 18 1300          Evaluation/Treatment Time and Intent    Document Type therapy note (daily note)  -AB therapy note (daily note)  -AH     Mode of Treatment occupational therapy  -AB  --     Patient/Family Observations Pt. agreeable to therapy, no complaints  -AB pt agreeable to therapy  -AH     Recorded by [AB] Tiffanie Bell OT [AH] Kerry Dougherty OT     Row Name 18 1507 18 1300          Cognition/Psychosocial- PT/OT    Affect/Mental Status (Cognitive) WFL  -AB WFL  -AH     Follows Commands (Cognition) WFL  -AB  --     Recorded by [AB] Tiffanie Bell OT [AH] Kerry Dougherty OT     Row Name 18 1507 18 1300          Transfer Assessment/Treatment    Bed-Chair [place] (Transfers)  -- verbal cues;contact guard  -AH     Chair-Bed [place] (Transfers) contact guard;verbal cues  -AB  --     Recorded by [AB] Tiffanie Bell OT [AH] Kerry Dougherty OT     Row Name 18 1507             Chair-Bed Transfer    Assistive Device (Chair-Bed Transfers) walker, front-wheeled;wheelchair  -AB      Recorded by [AB] Tiffanie Bell OT      Row Name 18 1507             Safety Issues, Functional Mobility    Comment, Safety Issues/Impairments (Mobility) fall precautions, <skin integrity, wound vac RLE  -AB      Recorded by [AB] Tiffanie Bell OT      Row Name 18 1507             Grooming Assessment/Treatment    Grooming [place] Level set up  -AB      Grooming Position supported sitting;sink side  -AB      Recorded by [AB] Tiffanie Blel OT       Row Name 05/04/18 1507 05/04/18 1300          Upper Extremity Seated Therapeutic Exercise    Device, Seated Upper Extremity (Therapeutic Exercise)  -- restorator/arm bike   4 min x3  -     Exercise Type, Seated Upper Extremity (Therapeutic Exercise) AROM (active range of motion);other (see comments)   BUE G/FMC TherEx/Act, strengthening, fxl activity ambika tasks  -AB AROM (active range of motion)   functional activity tolerance, bilateral coordination  -     Expected Outcomes, Seated Upper Extremity (Therapeutic Exercise) improve functional tolerance, self-care activity;improve performance, BADLs  -AB  --     Recorded by [AB] Tiffanie Bell OT [AH] Kerry Dougherty, TAINA     Row Name 05/04/18 1507             Positioning and Restraints    Post Treatment Position bed  -AB      In Bed supine;call light within reach;encouraged to call for assist;side rails up x2;RLE elevated   post second session  -AB      Recorded by [AB] Tiffanie Bell OT        User Key  (r) = Recorded By, (t) = Taken By, (c) = Cosigned By    Initials Name Effective Dates    AB Tiffanie Bell, OT 04/03/18 -     AH Kerry Dougherty, TAINA 04/03/18 -           Wound 04/23/18 1945 Right lateral calf surgical (Active)   Dressing Appearance dry;intact 5/4/2018  7:20 AM   Periwound intact;edematous 5/4/2018  7:20 AM   Periwound Temperature warm 5/4/2018  7:20 AM       NPWT (Negative Pressure Wound Therapy) 04/25/18 1600 Right Lateral Calf (Active)   Therapy Setting continuous therapy 5/4/2018  7:20 AM   Dressing foam, black 5/4/2018  7:20 AM   Pressure Setting 125 mmHg 5/4/2018  7:20 AM         OT Recommendation and Plan    Anticipated Equipment Needs At Discharge (OT Eval):  (TBD)  Planned Therapy Interventions (OT Eval): activity tolerance training, adaptive equipment training, BADL retraining, ROM/therapeutic exercise, strengthening exercise, transfer/mobility retraining    Plan of Care Review  Plan of Care Reviewed  With: patient  Plan of Care Reviewed With: patient  IRF Plan of Care Review: progress ongoing, continue  Progress, Functional Goals: demonstrating adequate progress  Outcome Summary: Pt. w/ good tolerance and rest breaks provided. Continue w/ current POC.           OT IRF GOALS     Row Name 05/02/18 1244 05/02/18 1242 04/25/18 1144       Transfer Goal 1 (OT-IRF)    Activity/Assistive Device (Transfer Goal 1, OT-IRF)  --  -- bed-to-chair/chair-to-bed;toilet  -AB    [place] Level (Transfer Goal 1, OT-IRF)  --  -- standby assist  -AB    Time Frame (Transfer Goal 1, OT-IRF)  --  -- long term goal (LTG);by discharge  -AB       Bathing Goal 1 (OT-IRF)    Activity/Device (Bathing Goal 1, OT-IRF) bathing skills, all  -AB  -- bathing skills, all  -AB    [place] Level (Bathing Goal 1, OT-IRF) moderate assist (50-74% patient effort);minimum assist (75% or more patient effort)  -AB  -- moderate assist (50-74% patient effort);verbal cues required  -AB    Time Frame (Bathing Goal 1, OT-IRF) short term goal (STG);5 - 7 days  -AB  -- short term goal (STG);5 - 7 days  -AB    Progress/Outcomes (Bathing Goal 1, OT-IRF)  -- goal met  -AB  --       Bathing Goal 2 (OT-IRF)    Activity/Device (Bathing Goal 2, OT-IRF)  --  -- bathing skills, all  -AB    [place] Level (Bathing Goal 2, OT-IRF)  --  -- minimum assist (75% or more patient effort);verbal cues required  -AB    Time Frame (Bathing Goal 2, OT-IRF)  --  -- long term goal (LTG);by discharge  -AB       LB Dressing Goal 1 (OT-IRF)    Activity/Device (LB Dressing Goal 1, OT-IRF) lower body dressing  -AB  -- lower body dressing  -AB    [place] (LB Dressing Goal 1, OT-IRF) maximum assist (25-49% patient effort);moderate assist (50-74% patient effort)  -AB  -- maximum assist (25-49% patient effort)  -AB    Time Frame (LB Dressing Goal 1, OT-IRF) short term goal (STG);5 - 7 days  -AB  -- short term goal (STG);5 - 7 days  -AB    Progress/Outcomes (LB Dressing Goal 1,  OT-IRF)  -- goal met  -AB  --       LB Dressing Goal 2 (OT-IRF)    Activity/Device (LB Dressing Goal 2, OT-IRF)  --  -- lower body dressing  -AB    [place] (LB Dressing Goal 2, OT-IRF)  --  -- moderate assist (50-74% patient effort)  -AB    Time Frame (LB Dressing Goal 2, OT-IRF)  --  -- long term goal (LTG);by discharge  -AB       Toileting Goal 1 (OT-IRF)    [place] Level (Toileting Goal 1, OT-IRF)  --  -- minimum assist (75% or more patient effort)  -AB    Time Frame (Toileting Goal 1, OT-IRF)  --  -- long term goal (LTG);by discharge  -AB      User Key  (r) = Recorded By, (t) = Taken By, (c) = Cosigned By    Initials Name Provider Type    AB Tiffanie Bell OT Occupational Therapist                 Time Calculation:           Time Calculation- OT     Row Name 05/04/18 1457 05/04/18 1346          Time Calculation- OT    OT Start Time 1400  -AB 1245  -     OT Stop Time 1445  -AB 1330  -     OT Time Calculation (min) 45 min  -AB 45 min  -     Total Timed Code Minutes- OT 45 minute(s)  -AB  --     OT Non-Billable Time (min) 15 min  -AB  --       User Key  (r) = Recorded By, (t) = Taken By, (c) = Cosigned By    Initials Name Provider Type    AB Tiffanie Bell OT Occupational Therapist     Kerry Dougherty, OT Occupational Therapist             Therapy Charges for Today     Code Description Service Date Service Provider Modifiers Qty    88745463426 HC OT THERAPEUTIC ACT EA 15 MIN 5/3/2018 Tiffanie Bell OT GO 5    06089874221 HC OT SELF CARE/MGMT/TRAIN EA 15 MIN 5/3/2018 Tiffanie Bell OT GO 1    52809337171 HC OT THER SUPP EA 15 MIN 5/3/2018 Tiffanie Bell OT GO 1    99178076261 HC OT THERAPEUTIC ACT EA 15 MIN 5/4/2018 Tiffanie Bell OT GO 2    [id number] HC OT SELF CARE/MGMT/TRAIN EA 15 MIN 5/4/2018 Tiffanie Bell, OT GO 1    [id number]  OT THER SUPP EA 15 MIN 5/4/2018 Tiffanie Bell OT GO 1                   Tiffanie  Heidi Bell, OT  5/4/2018

## 2018-05-04 NOTE — PLAN OF CARE
Problem: Patient Care Overview  Goal: Plan of Care Review   05/04/18 1347   Patient Care Overview   IRF Plan of Care Review progress ongoing, continue   Progress, Functional Goals demonstrating adequate progress   Coping/Psychosocial   Plan of Care Reviewed With patient   OTHER   Outcome Summary patient tolerated therapy well with rest breaks provided

## 2018-05-04 NOTE — PROGRESS NOTES
Inpatient Rehabilitation Functional Measures Assessment and Plan of Care    Plan of Care   Pain    Pain Management (Active)  Current Status (5/5/2018 12:00:00 AM): impaired comfort r/t surgical wound  Weekly Goal: state decrease of pain  Discharge Goal: identify measures to control pain    Body Function Structure    Balance (Active)  Current Status (5/5/2018 12:00:00 AM): impaired physical mobility  Weekly Goal: increase in physical activity  Discharge Goal: demonstrate use of adaptive equipment    Safety    Potential for Injury (Active)  Current Status (4/24/2018 12:00:00 AM): risk for injury r/t falls  Weekly Goal: free from injury this shift  Discharge Goal: free from injury during hospital stay    Functional Measures  DERREK Eating:  DERREK Grooming:  DERREK Bathing:  DERREK Upper Body Dressing:  DERREK Lower Body Dressing:  DERREK Toileting:    DERREK Bladder Management  Level of Assistance:  Frequency/Number of Accidents this Shift:    DERREK Bowel Management  Level of Assistance:  Frequency/Number of Accidents this Shift:    DERREK Bed/Chair/Wheelchair Transfer:  DERREK Toilet Transfer:  DERREK Tub/Shower Transfer:    Previously Documented Mode of Locomotion at Discharge:  [facility] Expected Mode of Locomotion at Discharge:  DERREK Walk/Wheelchair:  DERREK Stairs:    DERREK Comprehension:  Both ( auditory and visual) modes of comprehension are used  equally. Comprehension Score = 6, Modified [place].  Patient comprehends  complex/abstract information in their primary language, requiring: Additional  time.  DERREK Expression:  Both ( vocal and non-vocal) modes of expression are used  equally. Expression Score = 6,  Modified Independent. Patient expresses  complex/abstract information in their primary language, requiring: Additional  time.  DERREK Social Interaction:  Social Interaction Score = 6, Modified Independent.  Patient is modified independent for social interaction, requiring: Requires  additional time.  DERREK Problem Solving:  Problem Solving Score =  6, Modified [place].  Patient  makes appropriate decisions in order to solve complex problems, but requires  extra time.  DERREK Memory:  Memory Score = 6, Modified [place].  Patient is modified  independent for memory, requiring: Requires additional time.    Therapy Mode Minutes  Occupational Therapy:  Physical Therapy:  Speech Language Pathology:    Discharge Functional Goals:    Signed by: Brooklyn Dougherty Nurse

## 2018-05-05 LAB
ANION GAP SERPL CALCULATED.3IONS-SCNC: 7.6 MMOL/L (ref 3.6–11.2)
BASOPHILS # BLD AUTO: 0.02 10*3/MM3 (ref 0–0.3)
BASOPHILS NFR BLD AUTO: 0.3 % (ref 0–2)
BUN BLD-MCNC: 17 MG/DL (ref 7–21)
BUN/CREAT SERPL: 14 (ref 7–25)
CALCIUM SPEC-SCNC: 7.6 MG/DL (ref 7.7–10)
CHLORIDE SERPL-SCNC: 99 MMOL/L (ref 99–112)
CO2 SERPL-SCNC: 23.4 MMOL/L (ref 24.3–31.9)
CREAT BLD-MCNC: 1.21 MG/DL (ref 0.43–1.29)
DEPRECATED RDW RBC AUTO: 48.2 FL (ref 37–54)
EOSINOPHIL # BLD AUTO: 0.11 10*3/MM3 (ref 0–0.7)
EOSINOPHIL NFR BLD AUTO: 1.6 % (ref 0–7)
ERYTHROCYTE [DISTWIDTH] IN BLOOD BY AUTOMATED COUNT: 15.6 % (ref 11.5–14.5)
GFR SERPL CREATININE-BSD FRML MDRD: 57 ML/MIN/1.73
GLUCOSE BLD-MCNC: 91 MG/DL (ref 70–110)
HCT VFR BLD AUTO: 25.1 % (ref 42–52)
HGB BLD-MCNC: 7.6 G/DL (ref 14–18)
IMM GRANULOCYTES # BLD: 0.09 10*3/MM3 (ref 0–0.03)
IMM GRANULOCYTES NFR BLD: 1.3 % (ref 0–0.5)
LYMPHOCYTES # BLD AUTO: 1.48 10*3/MM3 (ref 1–3)
LYMPHOCYTES NFR BLD AUTO: 22 % (ref 16–46)
MCH RBC QN AUTO: 26.1 PG (ref 27–33)
MCHC RBC AUTO-ENTMCNC: 30.3 G/DL (ref 33–37)
MCV RBC AUTO: 86.3 FL (ref 80–94)
MONOCYTES # BLD AUTO: 0.89 10*3/MM3 (ref 0.1–0.9)
MONOCYTES NFR BLD AUTO: 13.2 % (ref 0–12)
NEUTROPHILS # BLD AUTO: 4.13 10*3/MM3 (ref 1.4–6.5)
NEUTROPHILS NFR BLD AUTO: 61.6 % (ref 40–75)
OSMOLALITY SERPL CALC.SUM OF ELEC: 261.9 MOSM/KG (ref 273–305)
PLATELET # BLD AUTO: 210 10*3/MM3 (ref 130–400)
PMV BLD AUTO: 8.6 FL (ref 6–10)
POTASSIUM BLD-SCNC: 4.2 MMOL/L (ref 3.5–5.3)
RBC # BLD AUTO: 2.91 10*6/MM3 (ref 4.7–6.1)
SODIUM BLD-SCNC: 130 MMOL/L (ref 135–153)
WBC NRBC COR # BLD: 6.72 10*3/MM3 (ref 4.5–12.5)

## 2018-05-05 PROCEDURE — 85025 COMPLETE CBC W/AUTO DIFF WBC: CPT | Performed by: FAMILY MEDICINE

## 2018-05-05 PROCEDURE — 94799 UNLISTED PULMONARY SVC/PX: CPT

## 2018-05-05 PROCEDURE — 80048 BASIC METABOLIC PNL TOTAL CA: CPT | Performed by: FAMILY MEDICINE

## 2018-05-05 PROCEDURE — 92610 EVALUATE SWALLOWING FUNCTION: CPT | Performed by: SPEECH-LANGUAGE PATHOLOGIST

## 2018-05-05 RX ADMIN — ALBUTEROL SULFATE 2.5 MG: 2.5 SOLUTION RESPIRATORY (INHALATION) at 07:56

## 2018-05-05 RX ADMIN — CHOLECALCIFEROL TAB 10 MCG (400 UNIT) 1000 UNITS: 10 TAB at 09:45

## 2018-05-05 RX ADMIN — APIXABAN 5 MG: 5 TABLET, FILM COATED ORAL at 21:59

## 2018-05-05 RX ADMIN — CARVEDILOL 12.5 MG: 6.25 TABLET, FILM COATED ORAL at 21:59

## 2018-05-05 RX ADMIN — PANTOPRAZOLE SODIUM 40 MG: 40 TABLET, DELAYED RELEASE ORAL at 17:08

## 2018-05-05 RX ADMIN — GUAIFENESIN 600 MG: 600 TABLET, EXTENDED RELEASE ORAL at 22:00

## 2018-05-05 RX ADMIN — CARBOXYMETHYLCELLULOSE SODIUM 2 DROP: 5 SOLUTION/ DROPS OPHTHALMIC at 21:59

## 2018-05-05 RX ADMIN — LEVOTHYROXINE SODIUM 150 MCG: 150 TABLET ORAL at 06:05

## 2018-05-05 RX ADMIN — FLUTICASONE PROPIONATE 2 SPRAY: 50 SPRAY, METERED NASAL at 09:45

## 2018-05-05 RX ADMIN — CARBOXYMETHYLCELLULOSE SODIUM 2 DROP: 5 SOLUTION/ DROPS OPHTHALMIC at 09:45

## 2018-05-05 RX ADMIN — ALFUZOSIN HYDROCHLORIDE 10 MG: 10 TABLET, EXTENDED RELEASE ORAL at 09:46

## 2018-05-05 RX ADMIN — ACETAMINOPHEN 650 MG: 325 TABLET ORAL at 21:59

## 2018-05-05 RX ADMIN — POTASSIUM CHLORIDE 10 MEQ: 750 TABLET, FILM COATED, EXTENDED RELEASE ORAL at 09:45

## 2018-05-05 RX ADMIN — NIFEDIPINE 30 MG: 30 TABLET, EXTENDED RELEASE ORAL at 09:45

## 2018-05-05 RX ADMIN — PANTOPRAZOLE SODIUM 40 MG: 40 TABLET, DELAYED RELEASE ORAL at 09:45

## 2018-05-05 RX ADMIN — APIXABAN 5 MG: 5 TABLET, FILM COATED ORAL at 09:45

## 2018-05-05 RX ADMIN — ALBUTEROL SULFATE 2.5 MG: 2.5 SOLUTION RESPIRATORY (INHALATION) at 19:47

## 2018-05-05 RX ADMIN — KETOTIFEN FUMARATE 1 DROP: 0.35 SOLUTION/ DROPS OPHTHALMIC at 21:59

## 2018-05-05 RX ADMIN — PROBENECID 500 MG: 500 TABLET, FILM COATED ORAL at 09:46

## 2018-05-05 RX ADMIN — MECLIZINE HCL 12.5 MG: 12.5 TABLET ORAL at 09:45

## 2018-05-05 RX ADMIN — Medication 1000 MCG: at 09:45

## 2018-05-05 RX ADMIN — GUAIFENESIN 600 MG: 600 TABLET, EXTENDED RELEASE ORAL at 09:45

## 2018-05-05 RX ADMIN — FUROSEMIDE 40 MG: 40 TABLET ORAL at 09:45

## 2018-05-05 RX ADMIN — CARVEDILOL 12.5 MG: 6.25 TABLET, FILM COATED ORAL at 09:45

## 2018-05-05 RX ADMIN — MECLIZINE HCL 12.5 MG: 12.5 TABLET ORAL at 21:59

## 2018-05-05 RX ADMIN — KETOTIFEN FUMARATE 1 DROP: 0.35 SOLUTION/ DROPS OPHTHALMIC at 09:45

## 2018-05-05 NOTE — PLAN OF CARE
Problem: Patient Care Overview  Goal: Plan of Care Review  Outcome: Ongoing (interventions implemented as appropriate)      Problem: Fall Risk (Adult)  Goal: Absence of Fall  Outcome: Ongoing (interventions implemented as appropriate)      Problem: Skin Injury Risk (Adult)  Goal: Skin Health and Integrity  Outcome: Ongoing (interventions implemented as appropriate)      Problem: Functional Mobility Impairment (IRF) (Adult)  Goal: Optimal/Safe Level of [place] with Mobility  Outcome: Ongoing (interventions implemented as appropriate)      Problem: BADL Skill Impairment (IRF) (Adult)  Goal: Optimal Functional [place] for BADLs (Performing or Directing)  Outcome: Ongoing (interventions implemented as appropriate)      Problem: Wound (Includes Pressure Injury) (Adult)  Goal: Signs and Symptoms of Listed Potential Problems Will be Absent, Minimized or Managed (Wound)  Outcome: Ongoing (interventions implemented as appropriate)

## 2018-05-05 NOTE — PROGRESS NOTES
Rehabilitation Nursing  Inpatient Rehabilitation Plan of Care Note    Plan of Care  Copy from Rufina    Pain Management (Active)  Current Status (5/5/2018 12:00:00 AM): impaired comfort r/t surgical wound  Weekly Goal: state decrease of pain  Discharge Goal: identify measures to control pain    Body Function Structure    Balance (Active)  Current Status (5/5/2018 12:00:00 AM): impaired physical mobility  Weekly Goal: increase in physical activity  Discharge Goal: demonstrate use of adaptive equipment    Safety    Potential for Injury (Active)  Current Status (4/24/2018 12:00:00 AM): risk for injury r/t falls  Weekly Goal: free from injury this shift  Discharge Goal: free from injury during hospital stay    Signed by: Brooklyn Dougherty Nurse

## 2018-05-05 NOTE — PROGRESS NOTES
"     LOS: 11 days     Chief Complaint:  Right leg injury    Subjective     Interval History:     He states he's feeling fairly well overall.  Improving mobility.  Oxygen saturations on room air greater than 95%.  Systolic blood pressure less than 140.      Objective     Vital Signs  /57 (BP Location: Right arm, Patient Position: Lying)   Pulse 74   Temp 98.3 °F (36.8 °C) (Oral)   Resp 18   Ht 177.8 cm (70\")   Wt 109 kg (240 lb)   SpO2 99%   BMI 34.44 kg/m²   Intake & Output (last day)       05/04 0701 - 05/05 0700 05/05 0701 - 05/06 0700    P.O. 1800 240    Total Intake(mL/kg) 1800 (16.5) 240 (2.2)    Net +1800 +240          Unmeasured Urine Occurrence 10 x 1 x            Physical Exam:     General Appearance:    Alert, cooperative, in no acute distress   Head:    Normocephalic, without obvious abnormality, atraumatic   Eyes:            Lids and lashes normal, conjunctivae and sclerae normal, no   icterus, no pallor, corneas clear, PERRLA       Throat:   No oral lesions, no thrush, oral mucosa moist   Neck:   No adenopathy, supple, trachea midline, no thyromegaly, no   carotid bruit, no JVD   Lungs:     Clear to auscultation,respirations regular, even and                  unlabored    Heart:    Regular rhythm and normal rate, normal S1 and S2, no            murmur, no gallop, no rub, no click   Chest Wall:    No abnormalities observed   Abdomen:     Normal bowel sounds, no masses, no organomegaly, soft        non-tender, non-distended, no guarding, no rebound                tenderness   Extremities:   no edema, no cyanosis, no redness.  Right lower extremity with wound VAC                         Results Review:    Lab Results   Component Value Date    WBC 6.72 05/05/2018    HGB 7.6 (L) 05/05/2018    HCT 25.1 (L) 05/05/2018    MCV 86.3 05/05/2018     05/05/2018       Lab Results   Component Value Date    GLUCOSE 91 05/05/2018    BUN 17 05/05/2018    CREATININE 1.21 05/05/2018    EGFRIFNONA 57 (L) " 05/05/2018    BCR 14.0 05/05/2018     (L) 05/05/2018    K 4.2 05/05/2018    CL 99 05/05/2018    CO2 23.4 (L) 05/05/2018    CALCIUM 7.6 (L) 05/05/2018    ALBUMIN 2.60 (L) 04/25/2018    LABIL2 0.7 (L) 04/25/2018    AST 39 (H) 04/25/2018    ALT 27 04/25/2018     Lab Results   Component Value Date    INR 1.28 (H) 01/03/2018    INR 1.93 (H) 04/25/2017       No results found for: POCGLU       Medication Review:     Current Facility-Administered Medications:   •  acetaminophen (TYLENOL) tablet 650 mg, 650 mg, Oral, Q4H PRN, Samuel Duane Kreis, MD, 650 mg at 04/24/18 2124  •  albuterol (PROVENTIL) nebulizer solution 0.083% 2.5 mg/3mL, 2.5 mg, Nebulization, Q6H PRN, Samuel Duane Kreis, MD, 2.5 mg at 05/04/18 1137  •  albuterol (PROVENTIL) nebulizer solution 0.083% 2.5 mg/3mL, 2.5 mg, Nebulization, BID - RT, Samuel Duane Kreis, MD, 2.5 mg at 05/05/18 0756  •  alfuzosin (UROXATRAL) 24 hr tablet 10 mg, 10 mg, Oral, Daily, Samuel Duane Kreis, MD, 10 mg at 05/05/18 0946  •  apixaban (ELIQUIS) tablet 5 mg, 5 mg, Oral, Q12H, Bobbi Moses, [facility], 5 mg at 05/05/18 0945  •  bisacodyl (DULCOLAX) suppository 10 mg, 10 mg, Rectal, Daily PRN, Samuel Duane Kreis, MD  •  carboxymethylcellulose (REFRESH PLUS) 0.5 % ophthalmic solution 2 drop, 2 drop, Both Eyes, TID PRN, Samuel Duane Kreis, MD, 2 drop at 05/05/18 0945  •  carvedilol (COREG) tablet 12.5 mg, 12.5 mg, Oral, Q12H, Samuel Duane Kreis, MD, 12.5 mg at 05/05/18 0945  •  cholecalciferol (VITAMIN D3) tablet 1,000 Units, 1,000 Units, Oral, Daily, Samuel Duane Kreis, MD, 1,000 Units at 05/05/18 0945  •  fluticasone (FLONASE) 50 MCG/ACT nasal spray 2 spray, 2 spray, Each Nare, Daily, Samuel Duane Kreis, MD, 2 spray at 05/05/18 0945  •  furosemide (LASIX) tablet 40 mg, 40 mg, Oral, Daily, Samuel Duane Kreis, MD, 40 mg at 05/05/18 0945  •  guaiFENesin (MUCINEX) 12 hr tablet 600 mg, 600 mg, Oral, Q12H, Samuel Duane Kreis, MD, 600 mg at 05/05/18 0945  •  ketotifen (ZADITOR) 0.025 %  ophthalmic solution 1 drop, 1 drop, Both Eyes, BID, Samuel Duane Kreis, MD, 1 drop at 05/05/18 0945  •  levothyroxine (SYNTHROID, LEVOTHROID) tablet 150 mcg, 150 mcg, Oral, Q AM, Samuel Duane Kreis, MD, 150 mcg at 05/05/18 0605  •  magnesium hydroxide (MILK OF MAGNESIA) suspension 2400 mg/10mL 10 mL, 10 mL, Oral, Daily PRN, Samuel Duane Kreis, MD, 10 mL at 04/30/18 0531  •  meclizine (ANTIVERT) tablet 12.5 mg, 12.5 mg, Oral, BID, Samuel Duane Kreis, MD, 12.5 mg at 05/05/18 0945  •  mometasone-formoterol (DULERA 100) inhaler 2 puff, 2 puff, Inhalation, BID, Samuel Duane Kreis, MD, 2 puff at 05/04/18 1937  •  NIFEdipine CC (ADALAT CC) 24 hr tablet 30 mg, 30 mg, Oral, Q24H, Samuel Duane Kreis, MD, 30 mg at 05/05/18 0945  •  ondansetron (ZOFRAN) tablet 4 mg, 4 mg, Oral, Q6H PRN **OR** ondansetron ODT (ZOFRAN-ODT) disintegrating tablet 4 mg, 4 mg, Oral, Q6H PRN **OR** ondansetron (ZOFRAN) injection 4 mg, 4 mg, Intravenous, Q6H PRN, Samuel Duane Kreis, MD  •  pantoprazole (PROTONIX) EC tablet 40 mg, 40 mg, Oral, BID AC, Samuel Duane Kreis, MD, 40 mg at 05/05/18 0945  •  polyethylene glycol 3350 powder (packet), 17 g, Oral, Daily PRN, Samuel Duane Kreis, MD  •  potassium chloride (K-DUR,KLOR-CON) ER tablet 10 mEq, 10 mEq, Oral, Daily, Samuel Duane Kreis, MD, 10 mEq at 05/05/18 0945  •  probenecid (BENEMID) tablet 500 mg, 500 mg, Oral, Daily, Samuel Duane Kreis, MD, 500 mg at 05/05/18 0946  •  vitamin B-12 (CYANOCOBALAMIN) tablet 1,000 mcg, 1,000 mcg, Oral, Daily, Samuel Duane Kreis, MD, 1,000 mcg at 05/05/18 0945      Assessment/Plan     Debility  Pulmonary embolism  Right lower extremity DVT with right lower extremity hematoma status post evacuation now with wound VAC  Hyponatremia  COPD  Hypertension  Chronic diastolic congestive heart failure  Chronic kidney disease stage III  Anemia with heme positive stools       Plan:  PT and OT ongoing     Full dose anticoagulation with Eliquis     He does have hypertension.   Continue nifedipine, carvedilol and monitor closely.      Continue Dulera twice a day    Stool was positive for occult blood but he has no obvious bleeding and hemoglobin is been relatively stable.  Withholding anticoagulation with acute right lower extremity DVT would seem to be riskier than continue with anticoagulation with close monitoring his hemoglobin    Hemoglobin has been stable      Samuel Duane Kreis, MD  05/05/18  11:13 AM

## 2018-05-05 NOTE — PROGRESS NOTES
Inpatient Rehabilitation Functional Measures Assessment    Functional Measures  DERREK Eating:  DERREK Grooming:  DERREK Bathing:  DERREK Upper Body Dressing:  DERREK Lower Body Dressing:  DERREK Toileting:  Activity was not observed.    DERREK Bladder Management  Level of Assistance:  Bladder Score = 5.  Patient is supervision/set-up for  bladder management, requiring: Setting out equipment. Emptying equipment.  Patient requires the following assistive device(s):  Urinal.  Frequency/Number of Accidents this Shift:  Bladder accidents this shift:  2 .    DERREK Bowel Management  Level of Assistance: Activity was not observed.  Frequency/Number of Accidents this Shift:    DERREK Bed/Chair/Wheelchair Transfer:  Activity was not observed.  DERREK Toilet Transfer:  Toilet Transfer was not observed this shift because  patient used bedpan/urinal only.  DERREK Tub/Shower Transfer:  Activity was not observed.    Previously Documented Mode of Locomotion at Discharge:  [facility] Expected Mode of Locomotion at Discharge:  DERREK Walk/Wheelchair:  DERREK Stairs:    DERREK Comprehension:  DERREK Expression:  DERREK Social Interaction:  DERREK Problem Solving:  DERREK Memory:    Therapy Mode Minutes  Occupational Therapy:  Physical Therapy:  Speech Language Pathology:    Discharge Functional Goals:    Signed by: JENI Lemus

## 2018-05-05 NOTE — PLAN OF CARE
Problem: Eating/Swallowing Impairment (IRF) (Adult)  Goal: Optimal/Safe Level of [place], Swallowing  Outcome: Outcome(s) achieved Date Met: 05/05/18 05/05/18 1315   Eating/Swallowing Impairment (IRF) (Adult)   Optimal/Safe Level of [place], Swallowing functional independence achieved     Clinical dysphagia evaluation completed see full note.

## 2018-05-05 NOTE — PLAN OF CARE
Problem: Patient Care Overview  Goal: Individualization and Mutuality  Outcome: Ongoing (interventions implemented as appropriate)    Goal: Discharge Needs Assessment  Outcome: Ongoing (interventions implemented as appropriate)      Problem: Skin Injury Risk (Adult)  Goal: Skin Health and Integrity  Outcome: Ongoing (interventions implemented as appropriate)      Problem: Functional Mobility Impairment (IRF) (Adult)  Goal: Optimal/Safe Level of [place] with Mobility  Outcome: Ongoing (interventions implemented as appropriate)      Problem: BADL Skill Impairment (IRF) (Adult)  Goal: Optimal Functional [place] for BADLs (Performing or Directing)  Outcome: Ongoing (interventions implemented as appropriate)      Problem: Wound (Includes Pressure Injury) (Adult)  Goal: Signs and Symptoms of Listed Potential Problems Will be Absent, Minimized or Managed (Wound)  Outcome: Ongoing (interventions implemented as appropriate)

## 2018-05-05 NOTE — PROGRESS NOTES
Inpatient Rehabilitation Functional Measures Assessment    Functional Measures  DERREK Eating:  Eating Score = 5. Patient is supervision/set-up for eating,  requiring: Opening containers. No assistive devices were required.  DERREK Grooming:  DERREK Bathing:  DERREK Upper Body Dressing:  DERREK Lower Body Dressing:  DERREK Toileting:  Activity was not observed.    DERREK Bladder Management  Level of Assistance:  Bladder Score = 5.  Patient is supervision/set-up for  bladder management, requiring: Setting out equipment. Emptying equipment.  Patient requires the following assistive device(s):  Urinal.  Frequency/Number of Accidents this Shift:  Bladder accidents this shift:  0 .  Patient has not had an accident, but used a device/medication this shift  requiring: urinal .    DERREK Bowel Management  Level of Assistance: Bowel Score = 7.  Patient is completely independent for  bowel management.  Patient did not have bowel movement.  No  medication/intervention was provided.  Frequency/Number of Accidents this Shift: Bowel accidents this shift: 0 .  Patient has not had an accident, but used a device/medication this shift  requiring: brief .    DERREK Bed/Chair/Wheelchair Transfer:  Bed/chair/wheelchair Transfer Score = 3.  Patient performs 50-74% of effort and requires moderate assistance (some  lifting) for transferring to and from the bed/chair/wheelchair. No assistive  devices were required.  DERREK Toilet Transfer:  Activity was not observed.  DERREK Tub/Shower Transfer:  Activity was not observed.    Previously Documented Mode of Locomotion at Discharge:  DERREK Expected Mode of Locomotion at Discharge:  DERREK Walk/Wheelchair:  DERREK Stairs:    DERREK Comprehension:  DERREK Expression:  DERREK Social Interaction:  DERREK Problem Solving:  DERREK Memory:    Therapy Mode Minutes  Occupational Therapy:  Physical Therapy:  Speech Language Pathology:    Discharge Functional Goals:    Signed by: JENI Farfan

## 2018-05-05 NOTE — PROGRESS NOTES
Inpatient Rehabilitation Functional Measures Assessment    Functional Measures  DERREK Eating:  Eating Score = 7. Patient is completely independent for eating.  There are no activity limitations.  DERREK Grooming:  DERREK Bathing:  DERREK Upper Body Dressing:  DERREK Lower Body Dressing:  DERREK Toileting:    DERREK Bladder Management  Level of Assistance:  Frequency/Number of Accidents this Shift:    DERREK Bowel Management  Level of Assistance:  Frequency/Number of Accidents this Shift:    DERREK Bed/Chair/Wheelchair Transfer:  DERREK Toilet Transfer:  DERREK Tub/Shower Transfer:    Previously Documented Mode of Locomotion at Discharge:  [facility] Expected Mode of Locomotion at Discharge:  DERREK Walk/Wheelchair:  DERREK Stairs:    DERREK Comprehension:  Auditory comprehension is the usual mode. Comprehension  Score = 7, Independent.  Patient comprehends complex/abstract information in  their primary language.  Patient is completely independent for auditory  comprehension.  There are no activity limitations.  DERREK Expression:  Vocal expression is the usual mode. Expression Score = 7,  Independent.  Patient expresses complex/abstract information in their primary  language.  Patient is completely independent for vocal expression.  There are no  activity limitations.  DERREK Social Interaction:  Social Interaction Score = 7, Independent. Patient is  completely independent for social interaction.  There are no activity  limitations.  DERREK Problem Solving:  Problem Solving Score = 7, Independent.  Patient makes  appropriate decisions in order to solve complex problems.  Patient is completely  independent for problem solving.  There are no activity limitations.  DERREK Memory:  Memory Score = 7, Independent.  Patient is completely independent  for memory.  There are no activity limitations.    Therapy Mode Minutes  Occupational Therapy:  Physical Therapy:  Speech Language Pathology: Individual: 25 minutes.    Discharge Functional Goals:    Signed by: Richa Wayne  therapist

## 2018-05-05 NOTE — PROGRESS NOTES
Inpatient Rehabilitation Functional Measures Assessment    Functional Measures  DERREK Eating:  DERREK Grooming:  DERREK Bathing:  DERREK Upper Body Dressing:  DERREK Lower Body Dressing:  DERREK Toileting:    DERREK Bladder Management  Level of Assistance:  Frequency/Number of Accidents this Shift:    DERREK Bowel Management  Level of Assistance:  Frequency/Number of Accidents this Shift:    DERREK Bed/Chair/Wheelchair Transfer:  EDRREK Toilet Transfer:  DERREK Tub/Shower Transfer:    Previously Documented Mode of Locomotion at Discharge:  DERREK Expected Mode of Locomotion at Discharge:  DERREK Walk/Wheelchair:  DERREK Stairs:    DERREK Comprehension:  Both ( auditory and visual) modes of comprehension are used  equally. Comprehension Score = 6, Modified [place].  Patient comprehends  complex/abstract information in their primary language, requiring:  DERREK Expression:  Both ( vocal and non-vocal) modes of expression are used  equally. Expression Score = 6,  Modified Independent. Patient expresses  complex/abstract information in their primary language, requiring:  DERREK Social Interaction:  Social Interaction Score = 6, Modified Independent.  Patient is modified independent for social interaction, requiring: Requires  additional time.  DERREK Problem Solving:  Problem Solving Score = 6, Modified [place].  Patient  makes appropriate decisions in order to solve complex problems, but requires  extra time.  DERREK Memory:  Memory Score = 6, Modified [place].  Patient is modified  independent for memory, requiring:    Therapy Mode Minutes  Occupational Therapy:  Physical Therapy:  Speech Language Pathology:    Discharge Functional Goals:    Signed by: Brooklyn Dougherty Nurse

## 2018-05-05 NOTE — THERAPY EVALUATION
Inpatient Rehabilitation - Speech Language Pathology   Swallow Initial Evaluation  Oscar   CLINICAL DYSPHAGIA EVALUATION     Patient Name: Carlos Cee  : 1934  MRN: [id number]  Today's Date: 2018               Admit Date: 2018    Carlos Cee  is seen at bedside this pm  on  [facility]patient [facility]  to assess safety/efficacy of swallowing fnx, determine safest/least restrictive diet tolerance. H/o debility, COPD exacerbation, respiratory failure, viral PNA, BEATRIZ, CKD stage III, anemia, bladder cancer, CHF, bleeding ulcer, disease of thyroid gland, and hypertension. Pt is a/a upon SLP entry and agreeable to participate. RN Edel and OT Yara states pt coughing w/ po intake of medicines and water this am. RN states pt consumes large quantity of pills at once.     Social History     Social History   • Marital status:      Spouse name: N/A   • Number of children: N/A   • Years of education: N/A     Occupational History   • Not on file.     Social History Main Topics   • Smoking status: Never Smoker   • Smokeless tobacco: Never Used   • Alcohol use No   • Drug use: No   • Sexual activity: No     Other Topics Concern   • Not on file     Social History Narrative   • No narrative on file      Chest xray not available in last 72 hours.     Diet Orders (active)     Start     Ordered    18 1800  Dietary Nutrition Supplements Ensure Enlive  Daily With Lunch & Dinner      18 1300    18  Diet Regular  Diet Effective Now      18 192        Pt currently tolerating room air w/o complaints.     Pt is positioned upright and centered in bed to accept multiple po presentations of ice chips solid cracker, puree and thin liquids via spoon, cup and straw.  Pt is able to self feed.     Facial/oral structures are symmetrical upon observation. Lingual protrusion reveals no deviation. Oral mucosa are moist, pink, and clean. Secretions are clear, thin, and well controlled. OROM/SIMÓN is  wfl to imitate oral postures. Gag is not assessed. Volitional cough is intact w/ adequate  intensity, clear in quality, non-productive. Voice is adequate in intensity, clear in quality w/ intelligible speech.    Upon po presentations, adequate bolus anticipation and acceptance w/ good labial seal for bolus clearance via spoon bowl, cup rim stability and suction via straw. Bolus formation, manipulation and control are wfl w/ rotary mastication pattern. A-p transit is timely w/o oral residue.     Pharyngeal swallow is timely w/ adequate hyolaryngeal elevation per palpation. No overt s/s aspiration evidenced across this evaluation. No silent aspiration suspected as pt is w/o changes in vocal quality, respirations or secretions post po presentations. Pt denies odynophagia.        Visit Dx:   No diagnosis found.  Patient Active Problem List   Diagnosis   • Debility     Past Medical History:   Diagnosis Date   • A-fib    • Anemia    • Bladder cancer    • Bleeding ulcer    • CHF (congestive heart failure)    • CKD (chronic kidney disease), stage III    • COPD (chronic obstructive pulmonary disease)    • Diastolic heart failure    • Disease of thyroid gland    • DVT (deep venous thrombosis)    • GI bleed    • Hypertension    • PE (pulmonary thromboembolism)      Past Surgical History:   Procedure Laterality Date   • CARDIAC DEFIBRILLATOR PLACEMENT     • CATARACT EXTRACTION     • DEBRIDEMENT LEG Right    • PACEMAKER REPLACEMENT     • PROSTATE SURGERY     • VENA CAVA FILTER INSERTION             EDUCATION  The patient has been educated in the following areas:   Dysphagia (Swallowing Impairment) Oral Care/Hydration.    SLP Recommendation and Plan  Impression: Pt presents w/ wfl oropharyngeal swallow w/o s/s aspiration. No s/s indicative of silent aspiration.  No odynophagia reported.  Continue current diet of regular consistencies w/ thin liquids. Meds whole in puree, crushed or via non oral method prn. SINGLE PRESENTATION  ONLY. Upright and centered for all po intake. [place] aspiration and DHAVAL precautions. Oral care protocol     Recommendations:  1. Regular consistencies w/ thin liquids    2. Meds whole in puree. Crushed or via non oral method prn.  SINGLE PRESENTATION ONLY  3. Upright and centered for all po intake  4. DHAVAL precautions.  5. Oral care protocol.    No further formal SLP f/u warranted at this time.    D/w pt results and recommendations w/ verbal agreement.    D/w RN ERIKA results and recommendations w/ verbal agreement.    Thank you for allowing me to participate in the care of your patient-  Zachary Garcia M.A., CCC-SLP         Time Calculation:         Time Calculation- SLP     Row Name 05/05/18 1319             Time Calculation- SLP    SLP Start Time 1255  -KS      SLP Stop Time 1320  -KS      SLP Time Calculation (min) 25 min  -KS        User Key  (r) = Recorded By, (t) = Taken By, (c) = Cosigned By    Initials Name Provider Type    KS Christopher Garcia MA,CCC-SLP Speech Therapist          Therapy Charges for Today     Code Description Service Date Service Provider Modifiers Qty    [id number] HC ST CARE PLAN 15 MIN 5/5/2018 Christopher Garcia MA,CCC-SLP GN 2    [id number]  ST EVAL ORAL PHARYNG SWALLOW 2 5/5/2018 Christopher Garcia MA,CCC-SLP GN 1               Christopher Garcia MA,CCC-SLP  5/5/2018

## 2018-05-05 NOTE — PROGRESS NOTES
Inpatient Rehabilitation Functional Measures Assessment    Functional Measures  DERREK Eating:  DERREK Grooming:  DERREK Bathing:  DERREK Upper Body Dressing:  DERREK Lower Body Dressing:  DERREK Toileting:    DERREK Bladder Management  Level of Assistance:  Frequency/Number of Accidents this Shift:    DERREK Bowel Management  Level of Assistance:  Frequency/Number of Accidents this Shift:    DERREK Bed/Chair/Wheelchair Transfer:  DERREK Toilet Transfer:  DERREK Tub/Shower Transfer:    Previously Documented Mode of Locomotion at Discharge:  DERREK Expected Mode of Locomotion at Discharge:  DERREK Walk/Wheelchair:  DERREK Stairs:    DERREK Comprehension:  Auditory comprehension is the usual mode. Comprehension  Score = 6, Modified [place].  Patient comprehends complex/abstract  information in their primary language, requiring:  DERREK Expression:  Vocal expression is the usual mode. Expression Score = 6,  Modified Independent.  Patient expresses complex/abstract information in their  primary language, requiring:  DERREK Social Interaction:  Social Interaction Score = 6, Modified Independent.  Patient is modified independent for social interaction, requiring: Requires  additional time.  DERREK Problem Solving:  Problem Solving Score = 6, Modified [place].  Patient  makes appropriate decisions in order to solve complex problems, but requires  extra time.  DERREK Memory:  Memory Score = 6, Modified [place].  Patient is modified  independent for memory, requiring: Requires additional time.    Therapy Mode Minutes  Occupational Therapy:  Physical Therapy:  Speech Language Pathology:    Discharge Functional Goals:    Signed by: Nurse Prudencio

## 2018-05-06 PROCEDURE — 94799 UNLISTED PULMONARY SVC/PX: CPT

## 2018-05-06 RX ADMIN — POTASSIUM CHLORIDE 10 MEQ: 750 TABLET, FILM COATED, EXTENDED RELEASE ORAL at 09:49

## 2018-05-06 RX ADMIN — PANTOPRAZOLE SODIUM 40 MG: 40 TABLET, DELAYED RELEASE ORAL at 17:36

## 2018-05-06 RX ADMIN — ACETAMINOPHEN 650 MG: 325 TABLET ORAL at 06:01

## 2018-05-06 RX ADMIN — KETOTIFEN FUMARATE 1 DROP: 0.35 SOLUTION/ DROPS OPHTHALMIC at 09:50

## 2018-05-06 RX ADMIN — ALFUZOSIN HYDROCHLORIDE 10 MG: 10 TABLET, EXTENDED RELEASE ORAL at 09:48

## 2018-05-06 RX ADMIN — KETOTIFEN FUMARATE 1 DROP: 0.35 SOLUTION/ DROPS OPHTHALMIC at 20:55

## 2018-05-06 RX ADMIN — APIXABAN 5 MG: 5 TABLET, FILM COATED ORAL at 20:55

## 2018-05-06 RX ADMIN — MECLIZINE HCL 12.5 MG: 12.5 TABLET ORAL at 09:50

## 2018-05-06 RX ADMIN — LEVOTHYROXINE SODIUM 150 MCG: 150 TABLET ORAL at 06:01

## 2018-05-06 RX ADMIN — FUROSEMIDE 40 MG: 40 TABLET ORAL at 09:50

## 2018-05-06 RX ADMIN — MAGNESIUM HYDROXIDE 10 ML: 2400 SUSPENSION ORAL at 20:54

## 2018-05-06 RX ADMIN — ALBUTEROL SULFATE 2.5 MG: 2.5 SOLUTION RESPIRATORY (INHALATION) at 19:43

## 2018-05-06 RX ADMIN — CARVEDILOL 12.5 MG: 6.25 TABLET, FILM COATED ORAL at 09:49

## 2018-05-06 RX ADMIN — APIXABAN 5 MG: 5 TABLET, FILM COATED ORAL at 09:49

## 2018-05-06 RX ADMIN — ALBUTEROL SULFATE 2.5 MG: 2.5 SOLUTION RESPIRATORY (INHALATION) at 07:35

## 2018-05-06 RX ADMIN — Medication 1000 MCG: at 09:49

## 2018-05-06 RX ADMIN — GUAIFENESIN 600 MG: 600 TABLET, EXTENDED RELEASE ORAL at 09:50

## 2018-05-06 RX ADMIN — GUAIFENESIN 600 MG: 600 TABLET, EXTENDED RELEASE ORAL at 20:54

## 2018-05-06 RX ADMIN — PANTOPRAZOLE SODIUM 40 MG: 40 TABLET, DELAYED RELEASE ORAL at 09:49

## 2018-05-06 RX ADMIN — ACETAMINOPHEN 650 MG: 325 TABLET ORAL at 20:54

## 2018-05-06 RX ADMIN — NIFEDIPINE 30 MG: 30 TABLET, EXTENDED RELEASE ORAL at 09:50

## 2018-05-06 RX ADMIN — CHOLECALCIFEROL TAB 10 MCG (400 UNIT) 1000 UNITS: 10 TAB at 09:50

## 2018-05-06 RX ADMIN — CARVEDILOL 12.5 MG: 6.25 TABLET, FILM COATED ORAL at 20:54

## 2018-05-06 RX ADMIN — MECLIZINE HCL 12.5 MG: 12.5 TABLET ORAL at 20:55

## 2018-05-06 NOTE — PROGRESS NOTES
Inpatient Rehabilitation Functional Measures Assessment    Functional Measures  DERREK Eating:  DERREK Grooming:  DERREK Bathing:  DERREK Upper Body Dressing:  DERREK Lower Body Dressing:  DERREK Toileting:    DERREK Bladder Management  Level of Assistance:  Frequency/Number of Accidents this Shift:    DERREK Bowel Management  Level of Assistance:  Frequency/Number of Accidents this Shift:    DERREK Bed/Chair/Wheelchair Transfer:  DERREK Toilet Transfer:  DERREK Tub/Shower Transfer:    Previously Documented Mode of Locomotion at Discharge:  DERREK Expected Mode of Locomotion at Discharge:  DERREK Walk/Wheelchair:  DERREK Stairs:    DERREK Comprehension:  Both ( auditory and visual) modes of comprehension are used  equally. Comprehension Score = 6, Modified [place].  Patient comprehends  complex/abstract information in their primary language, requiring: decreased  hearing . glassses and hearing aids at home .  DERREK Expression:  Both ( vocal and non-vocal) modes of expression are used  equally. Expression Score = 6,  Modified Independent. Patient expresses  complex/abstract information in their primary language, requiring:  DERREK Social Interaction:  Social Interaction Score = 6, Modified Independent.  Patient is modified independent for social interaction, requiring:  DERREK Problem Solving:  Problem Solving Score = 6, Modified [place].  Patient  makes appropriate decisions in order to solve complex problems, but requires  extra time.  DERREK Memory:  Memory Score = 6, Modified [place].  Patient is modified  independent for memory, requiring:    Therapy Mode Minutes  Occupational Therapy:  Physical Therapy:  Speech Language Pathology:    Discharge Functional Goals:    Signed by: Janice Buckley Nurse

## 2018-05-06 NOTE — PROGRESS NOTES
Inpatient Rehabilitation Functional Measures Assessment    Functional Measures  DERREK Eating:  DERREK Grooming:  DERREK Bathing:  DERREK Upper Body Dressing:  DERREK Lower Body Dressing:  DERREK Toileting:    DERREK Bladder Management  Level of Assistance:  Frequency/Number of Accidents this Shift:    DERREK Bowel Management  Level of Assistance:  Frequency/Number of Accidents this Shift:    DERREK Bed/Chair/Wheelchair Transfer:  DERREK Toilet Transfer:  DERREK Tub/Shower Transfer:    Previously Documented Mode of Locomotion at Discharge:  DERREK Expected Mode of Locomotion at Discharge:  DERREK Walk/Wheelchair:  DERREK Stairs:    DERREK Comprehension:  Both ( auditory and visual) modes of comprehension are used  equally. Comprehension Score = 6, Modified [place].  Patient comprehends  complex/abstract information in their primary language, requiring: Additional  time.  DERREK Expression:  Both ( vocal and non-vocal) modes of expression are used  equally. Expression Score = 6,  Modified Independent. Patient expresses  complex/abstract information in their primary language, requiring:  DERREK Social Interaction:  Social Interaction Score = 6, Modified Independent.  Patient is modified independent for social interaction, requiring: Requires  additional time.  DERREK Problem Solving:  Problem Solving Score = 6, Modified [place].  Patient  makes appropriate decisions in order to solve complex problems, but requires  extra time.  DERREK Memory:  Memory Score = 6, Modified [place].  Patient is modified  independent for memory, requiring: Requires additional time.    Therapy Mode Minutes  Occupational Therapy:  Physical Therapy:  Speech Language Pathology:    Discharge Functional Goals:    Signed by: Nurse Sarah

## 2018-05-06 NOTE — PROGRESS NOTES
Inpatient Rehabilitation Functional Measures Assessment    Functional Measures  DERREK Eating:  DERREK Grooming:  DERREK Bathing:  DERREK Upper Body Dressing:  DERREK Lower Body Dressing:  DERREK Toileting:  DERREK Bladder Management  Level of Assistance:  Frequency/Number of Accidents this Shift:  DERREK Bowel Management  Level of Assistance:  Frequency/Number of Accidents this Shift:    DERREK Bed/Chair/Wheelchair Transfer:  DERREK Toilet Transfer:  DERREK Tub/Shower Transfer  Previously Documented Mode of Locomotion at Discharge:  [facility] Expected Mode of Locomotion at Discharge:  [facility] Walk/Wheelchair:  [facility] Stairs:    [facility] Comprehension:  [facility] Expression:  [facility] Social Interaction:  [facility] Problem Solving:  [facility] Memory:    Therapy Mode Minutes  Occupational Therapy:  Physical Therapy:  Speech Language Pathology:    Discharge Functional Goals:    Signed by: JENI Lemus

## 2018-05-06 NOTE — PROGRESS NOTES
Rehabilitation Nursing  Inpatient Rehabilitation Plan of Care Note    Plan of Care  Copy from Rufina    Pain Management (Active)  Current Status (5/5/2018 12:00:00 AM): impaired comfort r/t surgical wound  Weekly Goal: state decrease of pain  Discharge Goal: identify measures to control pain    Body Function Structure    Balance (Active)  Current Status (5/5/2018 12:00:00 AM): impaired physical mobility  Weekly Goal: increase in physical activity  Discharge Goal: demonstrate use of adaptive equipment    Safety    Potential for Injury (Active)  Current Status (4/24/2018 12:00:00 AM): risk for injury r/t falls  Weekly Goal: free from injury this shift  Discharge Goal: free from injury during hospital stay    Signed by: Janice Buclkey Nurse

## 2018-05-06 NOTE — PROGRESS NOTES
Inpatient Rehabilitation Functional Measures Assessment    Functional Measures  DERREK Eating:  DERREK Grooming:  DERREK Bathing:  DERREK Upper Body Dressing:  DERREK Lower Body Dressing:  DERREK Toileting:  Activity was not observed.    DERREK Bladder Management  Level of Assistance:  Bladder Score = 5.  Patient is supervision/set-up for  bladder management, requiring: Setting out equipment. Emptying equipment.  Patient requires the following assistive device(s):  Urinal.  Frequency/Number of Accidents this Shift:  Bladder accidents this shift:  1 .    DERREK Bowel Management  Level of Assistance: Activity was not observed.  Frequency/Number of Accidents this Shift:    DERREK Bed/Chair/Wheelchair Transfer:  Activity was not observed.  DERREK Toilet Transfer:  Toilet Transfer was not observed this shift because  patient used bedpan/urinal only.  DERREK Tub/Shower Transfer:  Activity was not observed.    Previously Documented Mode of Locomotion at Discharge:  [facility] Expected Mode of Locomotion at Discharge:  DERREK Walk/Wheelchair:  DERREK Stairs:    DERREK Comprehension:  DERREK Expression:  DERREK Social Interaction:  DERREK Problem Solving:  DERREK Memory:    Therapy Mode Minutes  Occupational Therapy:  Physical Therapy:  Speech Language Pathology:    Discharge Functional Goals:    Signed by: JENI Lemus

## 2018-05-06 NOTE — PROGRESS NOTES
Inpatient Rehabilitation Functional Measures Assessment    Functional Measures  DERREK Eating:  Eating Score = 5. Patient is supervision/set-up for eating,  requiring: Opening containers. [place] bread. Cutting meat. No assistive  devices were required.  DERREK Grooming: Grooming Score = 5. Patient is supervision/set-up for grooming,  requiring: No assistive devices were required.  DERREK Bathing:  Patient bathed in bed. Patient requires moderate assistance for  washing, rinsing, or drying the right arm. Patient requires moderate assistance  for washing, rinsing, or drying the left arm. Patient requires moderate  assistance for washing, rinsing, or drying the chest. Patient requires moderate  assistance for washing, rinsing, or drying the abdomen. Patient requires maximal  assistance for washing, rinsing, or drying the perineal area. Patient requires  maximal assistance for washing, rinsing, or drying the buttocks. Patient  requires moderate assistance for washing, rinsing, or drying the right upper  leg. Patient requires moderate assistance for washing, rinsing, or drying the  left upper leg. Patient requires maximal assistance for washing, rinsing, or  drying the right lower leg, including the foot. Patient requires no physical  assistance for washing, rinsing, and drying the left lower leg, including the  foot. Patient performs 10 -  24% of bathing tasks.  Bathing Score = 1, Total  Assistance. No assistive devices were required.  DERREK Upper Body Dressing:  Patient requires total assistance for gathering  clothes. Wearing a bra or undershirt was not applicable for this patient.  Patient requires moderate/considerable physical assistance for threading the  right arm through the garment (shirt/sweater). Patient requires  moderate/considerable physical assistance for threading the left arm through the  garment (shirt/sweater). Patient requires moderate/considerable physical  assistance for pulling an over-head-garment over  head or pulling  front-fastening-garment around back. Patient requires moderate/considerable  physical assistance for pulling an over-head-garment down the trunk or  adjusting/fastening together a front-fastening-garment. Patient performs 60 % of  upper body dressing tasks. Upper Body Dressing Score = 3, Moderate Assistance.  No assistive devices were required.  DERREK Lower Body Dressing:  Patient requires total assistance for gathering  clothes. Wearing underwear or an undergarment is not applicable for this  patient. Patient requires moderate/considerable physical assistance for  threading the right leg through the pants/skirt. Patient requires  moderate/considerable physical assistance for threading the left leg through the  pants/skirt. Patient requires moderate/considerable physical assistance for  pulling pants/skirt over hips and adjusting fasteners. Patient requires  maximal/significant physical assistance for holding clothing and/or donning  and/or doffing right sock. Patient requires maximal/significant physical  assistance for holding clothing and/or donning and/or doffing left sock. Donning  and/or doffing right shoe is not applicable for this patient. Donning and/or  doffing left shoe is not applicable for this patient. Patient performs 33.33 %  of lower body dressing tasks. Lower Body Dressing Score = 2, Maximal Assistance.  No assistive devices were required.  DERREK Toileting:  Activity was not observed.    DERREK Bladder Management  Level of Assistance:  Bladder Score = 5.  Patient is supervision/set-up for  bladder management, requiring: Setting out equipment. Emptying equipment.  Patient requires the following assistive device(s):  Urinal. Adult brief.  Frequency/Number of Accidents this Shift:  Bladder accidents this shift:  1 .    DERREK Bowel Management  Level of Assistance: Bowel Score = 7.  Patient is completely independent for  bowel management.  Patient did not have bowel movement.   No  medication/intervention was provided.  Frequency/Number of Accidents this Shift: Bowel accidents this shift: 0 .  Patient has not had an accident, but used a device/medication this shift  requiring: brief .    DERREK Bed/Chair/Wheelchair Transfer:  Activity was not observed.  DERREK Toilet Transfer:  Activity was not observed.  DERREK Tub/Shower Transfer:  Activity was not observed.    Previously Documented Mode of Locomotion at Discharge:  [facility] Expected Mode of Locomotion at Discharge:  [facility] Walk/Wheelchair:  [facility] Stairs:    [facility] Comprehension:  [facility] Expression:  [facility] Social Interaction:  [facility] Problem Solving:  [facility] Memory:    Therapy Mode Minutes  Occupational Therapy:  Physical Therapy:  Speech Language Pathology:    Discharge Functional Goals:    Signed by: JENI Farfan

## 2018-05-06 NOTE — NURSING NOTE
NPWT dressing change  Patient tolerated well     05/06/18 1205   Wound 04/23/18 1945 Right lateral calf surgical   Date first assessed/Time first assessed: 04/23/18 1945   Present On Admission : yes;picture taken  Side: Right  Orientation: lateral  Location: calf  Type: surgical   Dressing Appearance dry;intact   Base red/granulating   Red (%), Wound Tissue Color 90   Yellow (%), Wound Tissue Color 10   Periwound macerated   Edges open   Dressing Care, Wound dressing changed   Periwound Care, Wound barrier film applied   NPWT (Negative Pressure Wound Therapy) 04/25/18 1600 Right Lateral Calf   Placement Date/Time: 04/25/18 1600   Location: Right Lateral Calf   Therapy Setting continuous therapy   Dressing foam, black   Pressure Setting 150 mmHg   Sponges Inserted 3   Sponges Removed 4   Finger sweep complete Yes

## 2018-05-06 NOTE — PLAN OF CARE
Problem: Patient Care Overview  Goal: Plan of Care Review  Outcome: Ongoing (interventions implemented as appropriate)   05/06/18 1516   Patient Care Overview   IRF Plan of Care Review progress ongoing, continue   Progress, Functional Goals demonstrating adequate progress   Coping/Psychosocial   Plan of Care Reviewed With patient       Problem: Skin Injury Risk (Adult)  Goal: Skin Health and Integrity  Outcome: Ongoing (interventions implemented as appropriate)      Problem: Wound (Includes Pressure Injury) (Adult)  Goal: Signs and Symptoms of Listed Potential Problems Will be Absent, Minimized or Managed (Wound)  Outcome: Ongoing (interventions implemented as appropriate)

## 2018-05-07 LAB — GLUCOSE BLDC GLUCOMTR-MCNC: 154 MG/DL (ref 70–130)

## 2018-05-07 PROCEDURE — 97530 THERAPEUTIC ACTIVITIES: CPT

## 2018-05-07 PROCEDURE — 97110 THERAPEUTIC EXERCISES: CPT

## 2018-05-07 PROCEDURE — 97535 SELF CARE MNGMENT TRAINING: CPT

## 2018-05-07 PROCEDURE — 97116 GAIT TRAINING THERAPY: CPT

## 2018-05-07 PROCEDURE — 94799 UNLISTED PULMONARY SVC/PX: CPT

## 2018-05-07 PROCEDURE — 82962 GLUCOSE BLOOD TEST: CPT

## 2018-05-07 RX ADMIN — CARBOXYMETHYLCELLULOSE SODIUM 2 DROP: 5 SOLUTION/ DROPS OPHTHALMIC at 09:08

## 2018-05-07 RX ADMIN — ALBUTEROL SULFATE 2.5 MG: 2.5 SOLUTION RESPIRATORY (INHALATION) at 06:58

## 2018-05-07 RX ADMIN — POTASSIUM CHLORIDE 10 MEQ: 750 TABLET, FILM COATED, EXTENDED RELEASE ORAL at 09:07

## 2018-05-07 RX ADMIN — KETOTIFEN FUMARATE 1 DROP: 0.35 SOLUTION/ DROPS OPHTHALMIC at 21:46

## 2018-05-07 RX ADMIN — CARVEDILOL 12.5 MG: 6.25 TABLET, FILM COATED ORAL at 21:47

## 2018-05-07 RX ADMIN — ALBUTEROL SULFATE 2.5 MG: 2.5 SOLUTION RESPIRATORY (INHALATION) at 19:22

## 2018-05-07 RX ADMIN — LEVOTHYROXINE SODIUM 150 MCG: 150 TABLET ORAL at 05:19

## 2018-05-07 RX ADMIN — CARBOXYMETHYLCELLULOSE SODIUM 2 DROP: 5 SOLUTION/ DROPS OPHTHALMIC at 21:46

## 2018-05-07 RX ADMIN — APIXABAN 5 MG: 5 TABLET, FILM COATED ORAL at 21:47

## 2018-05-07 RX ADMIN — CARVEDILOL 12.5 MG: 6.25 TABLET, FILM COATED ORAL at 09:06

## 2018-05-07 RX ADMIN — ACETAMINOPHEN 650 MG: 325 TABLET ORAL at 21:47

## 2018-05-07 RX ADMIN — Medication 1000 MCG: at 09:08

## 2018-05-07 RX ADMIN — PANTOPRAZOLE SODIUM 40 MG: 40 TABLET, DELAYED RELEASE ORAL at 09:07

## 2018-05-07 RX ADMIN — PROBENECID 500 MG: 500 TABLET, FILM COATED ORAL at 09:08

## 2018-05-07 RX ADMIN — KETOTIFEN FUMARATE 1 DROP: 0.35 SOLUTION/ DROPS OPHTHALMIC at 09:08

## 2018-05-07 RX ADMIN — MECLIZINE HCL 12.5 MG: 12.5 TABLET ORAL at 21:47

## 2018-05-07 RX ADMIN — NIFEDIPINE 30 MG: 30 TABLET, EXTENDED RELEASE ORAL at 09:07

## 2018-05-07 RX ADMIN — ACETAMINOPHEN 650 MG: 325 TABLET ORAL at 05:18

## 2018-05-07 RX ADMIN — FUROSEMIDE 40 MG: 40 TABLET ORAL at 09:07

## 2018-05-07 RX ADMIN — MECLIZINE HCL 12.5 MG: 12.5 TABLET ORAL at 09:07

## 2018-05-07 RX ADMIN — GUAIFENESIN 600 MG: 600 TABLET, EXTENDED RELEASE ORAL at 09:07

## 2018-05-07 RX ADMIN — PANTOPRAZOLE SODIUM 40 MG: 40 TABLET, DELAYED RELEASE ORAL at 17:40

## 2018-05-07 RX ADMIN — APIXABAN 5 MG: 5 TABLET, FILM COATED ORAL at 09:08

## 2018-05-07 RX ADMIN — GUAIFENESIN 600 MG: 600 TABLET, EXTENDED RELEASE ORAL at 21:47

## 2018-05-07 RX ADMIN — ALFUZOSIN HYDROCHLORIDE 10 MG: 10 TABLET, EXTENDED RELEASE ORAL at 09:08

## 2018-05-07 RX ADMIN — CHOLECALCIFEROL TAB 10 MCG (400 UNIT) 1000 UNITS: 10 TAB at 09:07

## 2018-05-07 NOTE — PROGRESS NOTES
Inpatient Rehabilitation Functional Measures Assessment    Functional Measures  DERREK Eating:  Eating Score = 5. Patient is supervision/set-up for eating,  requiring: Opening containers. [place] bread. Cutting meat. No assistive  devices were required.  DERREK Grooming: Patient requires minimal assistance for washing, rinsing and  drying the face. Patient requires minimal assistance for washing, rinsing and  drying the hands. Patient requires minimal assistance for brushing teeth.  Patient requires minimal assistance for brushing/combing hair. Shaving or  applying makeup not applicable for this patient. Patient performs 0 -  24% of  grooming tasks.  Grooming Score = 1, Total Assistance. No assistive devices were  required.  DERREK Bathing:  Patient bathed in bed. Patient requires moderate assistance for  washing, rinsing, or drying the right arm. Patient requires moderate assistance  for washing, rinsing, or drying the left arm. Patient requires moderate  assistance for washing, rinsing, or drying the chest. Patient requires moderate  assistance for washing, rinsing, or drying the abdomen. Patient requires maximal  assistance for washing, rinsing, or drying the perineal area. Patient requires  maximal assistance for washing, rinsing, or drying the buttocks. Patient  requires moderate assistance for washing, rinsing, or drying the right upper  leg. Patient requires moderate assistance for washing, rinsing, or drying the  left upper leg. Patient requires maximal assistance for washing, rinsing, or  drying the right lower leg, including the foot. Patient requires maximal  assistance for washing, rinsing, or drying the left lower leg, including the  foot. Patient performs 0 -  24% of bathing tasks.  Bathing Score = 1, Total  Assistance. No assistive devices were required.  DERREK Upper Body Dressing:  Patient requires total assistance for gathering  clothes. Wearing a bra or undershirt was not applicable for this  patient.  Patient requires moderate/considerable physical assistance for threading the  right arm through the garment (shirt/sweater). Patient requires  moderate/considerable physical assistance for threading the left arm through the  garment (shirt/sweater). Patient requires moderate/considerable physical  assistance for pulling an over-head-garment over head or pulling  front-fastening-garment around back. Patient requires moderate/considerable  physical assistance for pulling an over-head-garment down the trunk or  adjusting/fastening together a front-fastening-garment. Patient performs 60 % of  upper body dressing tasks. Upper Body Dressing Score = 3, Moderate Assistance.  No assistive devices were required.  DERREK Lower Body Dressing:  Patient requires total assistance for gathering  clothes. Wearing underwear or an undergarment is not applicable for this  patient. Patient requires moderate/considerable physical assistance for  threading the right leg through the pants/skirt. Patient requires  moderate/considerable physical assistance for threading the left leg through the  pants/skirt. Patient requires moderate/considerable physical assistance for  pulling pants/skirt over hips and adjusting fasteners. Patient requires  moderate/considerable physical assistance for donning and/or doffing right sock.  Patient requires moderate/considerable physical assistance for donning and/or  doffing left sock. Donning and/or doffing right shoe is not applicable for this  patient. Donning and/or doffing left shoe is not applicable for this patient.  Patient performs 41.67 % of lower body dressing tasks. Lower Body Dressing Score  = 2, Maximal Assistance. No assistive devices were required.  DERREK Toileting:  Patient requires moderate assistance for adjusting clothing  before using a toilet, commode, bedpan, or urinal. Patient requires moderate  assistance for hygiene. Patient requires moderate assistance for adjusting  clothing  after using a toilet, commode, bedpan, or urinal. Patient performs 0 -  24% of toileting tasks.  Toileting Score = 1, Total Assistance. Patient requires  the following assistive device(s): Grab bar.    DERREK Bladder Management  Level of Assistance:  Bladder Score = 5.  Patient is supervision/set-up for  bladder management, requiring: No assistive devices were required.  Frequency/Number of Accidents this Shift:  Bladder accidents this shift:  0 .  Patient has not had an accident, but used a device/medication this shift  requiring: urinal .    DERREK Bowel Management  Level of Assistance: Bowel Score = 7.  Patient is completely independent for  bowel management.  Patient did not have bowel movement.  No  medication/intervention was provided.  Frequency/Number of Accidents this Shift: Bowel accidents this shift: 0 .  Patient has not had an accident this shift.    DERREK Bed/Chair/Wheelchair Transfer:  Bed/chair/wheelchair Transfer Score = 3.  Patient performs 50-74% of effort and requires moderate assistance (some  lifting) for transferring to and from the bed/chair/wheelchair. No assistive  devices were required.  DERREK Toilet Transfer:  Activity was not observed.  DERREK Tub/Shower Transfer:  Activity was not observed.    Previously Documented Mode of Locomotion at Discharge:  DERREK Expected Mode of Locomotion at Discharge:  DERREK Walk/Wheelchair:  DERREK Stairs:    DERREK Comprehension:  DERREK Expression:  DERREK Social Interaction:  DERREK Problem Solving:  DERREK Memory:    Therapy Mode Minutes  Occupational Therapy:  Physical Therapy:  Speech Language Pathology:    Discharge Functional Goals:    Signed by: JENI Farfan

## 2018-05-07 NOTE — PLAN OF CARE
Problem: Patient Care Overview  Goal: Plan of Care Review  Outcome: Ongoing (interventions implemented as appropriate)    Goal: Individualization and Mutuality  Outcome: Ongoing (interventions implemented as appropriate)      Problem: Fall Risk (Adult)  Goal: Absence of Fall  Outcome: Ongoing (interventions implemented as appropriate)      Problem: Skin Injury Risk (Adult)  Goal: Skin Health and Integrity  Outcome: Ongoing (interventions implemented as appropriate)      Problem: Functional Mobility Impairment (IRF) (Adult)  Goal: Optimal/Safe Level of [place] with Mobility  Outcome: Ongoing (interventions implemented as appropriate)      Problem: Wound (Includes Pressure Injury) (Adult)  Goal: Signs and Symptoms of Listed Potential Problems Will be Absent, Minimized or Managed (Wound)  Outcome: Ongoing (interventions implemented as appropriate)

## 2018-05-07 NOTE — THERAPY TREATMENT NOTE
Inpatient Rehabilitation - Occupational Therapy Treatment Note     Oscar     Patient Name: Carlos Cee  : 1934  MRN: [id number]    Today's Date: 2018                 Admit Date: 2018      Visit Dx:  No diagnosis found.    Patient Active Problem List   Diagnosis   • Debility         Therapy Treatment          IRF Treatment Summary     Row Name 18 1222             Evaluation/Treatment Time and Intent    Document Type therapy note (daily note)  -AB      Mode of Treatment occupational therapy  -AB      Patient/Family Observations Pt. agreeable to therapy, c/o fatigue   -AB      Recorded by [AB] Tiffanie Bell OT      Row Name 18 1222             Cognition/Psychosocial- PT/OT    Affect/Mental Status (Cognitive) WFL  -AB      Follows Commands (Cognition) WFL  -AB      Recorded by [AB] Tiffanie Bell OT      Row Name 18 1222             Transfer Assessment/Treatment    Bed-Chair [place] (Transfers) contact guard  -AB      Chair-Bed [place] (Transfers) contact guard  -AB      Assistive Device (Bed-Chair Transfers) bed rails;wheelchair  -AB      Recorded by [AB] Tiffanie Bell OT      Row Name 18 1222             Chair-Bed Transfer    Assistive Device (Chair-Bed Transfers) wheelchair   bed rail  -AB      Recorded by [AB] Tiffanie Bell OT      Row Name 18 1222             Safety Issues, Functional Mobility    Comment, Safety Issues/Impairments (Mobility) fall precautions, <skin integrity, wound vac RLE  -AB      Recorded by [AB] Tiffanie Bell OT      Row Name 18 1222             Grooming Assessment/Treatment    Grooming [place] Level set up  -AB      Grooming Position supported sitting  -AB      Recorded by [AB] Tiffanie Bell OT      Row Name 18 1222             Upper Extremity Seated Therapeutic Exercise    Exercise Type, Seated Upper Extremity (Therapeutic Exercise) AROM (active range of  motion)   BUE CoordEx, G/FMC TherEx/Act, strengthening, fxl act ambika   -AB      Expected Outcomes, Seated Upper Extremity (Therapeutic Exercise) improve functional tolerance, self-care activity;improve performance, BADLs  -AB      Sets/Reps Detail, Seated Upper Extremity (Therapeutic Exercise) BUE Wrist Rolls X2  -AB      Recorded by [AB] Tiffanie Bell OT      Row Name 05/07/18 1222             Positioning and Restraints    Post Treatment Position bed  -AB      In Bed supine;call light within reach;encouraged to call for assist;side rails up x2;RLE elevated   in AM and PM  -AB      Recorded by [AB] Tiffanie Bell OT        User Key  (r) = Recorded By, (t) = Taken By, (c) = Cosigned By    Initials Name Effective Dates    AB Tiffanie Bell, TAINA 04/03/18 -           Wound 04/23/18 1945 Right lateral calf surgical (Active)   Dressing Appearance dry;intact 5/7/2018  9:15 AM       NPWT (Negative Pressure Wound Therapy) 04/25/18 1600 Right Lateral Calf (Active)   Therapy Setting continuous therapy 5/7/2018  9:15 AM   Dressing foam, black 5/7/2018  9:15 AM   Pressure Setting 150 mmHg 5/7/2018  9:15 AM         OT Recommendation and Plan    Anticipated Equipment Needs At Discharge (OT Eval):  (TBD)  Planned Therapy Interventions (OT Eval): activity tolerance training, adaptive equipment training, BADL retraining, ROM/therapeutic exercise, strengthening exercise, transfer/mobility retraining    Plan of Care Review  Plan of Care Reviewed With: patient  Plan of Care Reviewed With: patient  IRF Plan of Care Review: progress ongoing, continue  Progress, Functional Goals: demonstrating adequate progress  Outcome Summary: Pt. tolerated tx well w/ rest breaks provided. He is scheduled to discharge home tomorrow. Continue POC.           OT IRF GOALS     Row Name 05/02/18 1244 05/02/18 1242 04/25/18 1144       Transfer Goal 1 (OT-IRF)    Activity/Assistive Device (Transfer Goal 1, OT-IRF)  --  --  bed-to-chair/chair-to-bed;toilet  -AB    [place] Level (Transfer Goal 1, OT-IRF)  --  -- standby assist  -AB    Time Frame (Transfer Goal 1, OT-IRF)  --  -- long term goal (LTG);by discharge  -AB       Bathing Goal 1 (OT-IRF)    Activity/Device (Bathing Goal 1, OT-IRF) bathing skills, all  -AB  -- bathing skills, all  -AB    [place] Level (Bathing Goal 1, OT-IRF) moderate assist (50-74% patient effort);minimum assist (75% or more patient effort)  -AB  -- moderate assist (50-74% patient effort);verbal cues required  -AB    Time Frame (Bathing Goal 1, OT-IRF) short term goal (STG);5 - 7 days  -AB  -- short term goal (STG);5 - 7 days  -AB    Progress/Outcomes (Bathing Goal 1, OT-IRF)  -- goal met  -AB  --       Bathing Goal 2 (OT-IRF)    Activity/Device (Bathing Goal 2, OT-IRF)  --  -- bathing skills, all  -AB    [place] Level (Bathing Goal 2, OT-IRF)  --  -- minimum assist (75% or more patient effort);verbal cues required  -AB    Time Frame (Bathing Goal 2, OT-IRF)  --  -- long term goal (LTG);by discharge  -AB       LB Dressing Goal 1 (OT-IRF)    Activity/Device (LB Dressing Goal 1, OT-IRF) lower body dressing  -AB  -- lower body dressing  -AB    [place] (LB Dressing Goal 1, OT-IRF) maximum assist (25-49% patient effort);moderate assist (50-74% patient effort)  -AB  -- maximum assist (25-49% patient effort)  -AB    Time Frame (LB Dressing Goal 1, OT-IRF) short term goal (STG);5 - 7 days  -AB  -- short term goal (STG);5 - 7 days  -AB    Progress/Outcomes (LB Dressing Goal 1, OT-IRF)  -- goal met  -AB  --       LB Dressing Goal 2 (OT-IRF)    Activity/Device (LB Dressing Goal 2, OT-IRF)  --  -- lower body dressing  -AB    [place] (LB Dressing Goal 2, OT-IRF)  --  -- moderate assist (50-74% patient effort)  -AB    Time Frame (LB Dressing Goal 2, OT-IRF)  --  -- long term goal (LTG);by discharge  -AB       Toileting Goal 1 (OT-IRF)    [place] Level (Toileting Goal 1, OT-IRF)  --  -- minimum  assist (75% or more patient effort)  -AB    Time Frame (Toileting Goal 1, OT-IRF)  --  -- long term goal (LTG);by discharge  -AB      User Key  (r) = Recorded By, (t) = Taken By, (c) = Cosigned By    Initials Name Provider Type    AB Tiffanie Bell OT Occupational Therapist                 Time Calculation:           Time Calculation- OT     Row Name 05/07/18 1510 05/07/18 1045          Time Calculation- OT    OT Start Time 1330  -AB 1045  -AB     OT Stop Time 1415  -AB 1130  -AB     OT Time Calculation (min) 45 min  -AB 45 min  -AB     Total Timed Code Minutes- OT 45 minute(s)  -AB 45 minute(s)  -AB     OT Non-Billable Time (min)  -- 25 min  -AB       User Key  (r) = Recorded By, (t) = Taken By, (c) = Cosigned By    Initials Name Provider Type    AB Tiffanie Bell OT Occupational Therapist             Therapy Charges for Today     Code Description Service Date Service Provider Modifiers Qty    04885005863 HC OT THERAPEUTIC ACT EA 15 MIN 5/7/2018 Tiffanie Bell OT GO 5    22517265729 HC OT SELF CARE/MGMT/TRAIN EA 15 MIN 5/7/2018 Tiffanie Bell OT GO 1    [id number]  OT THER SUPP EA 15 MIN 5/7/2018 Tiffanie Bell OT GO 1                   Tiffanie Bell OT  5/7/2018

## 2018-05-07 NOTE — PLAN OF CARE
Problem: Patient Care Overview  Goal: Plan of Care Review  Outcome: Ongoing (interventions implemented as appropriate)   05/07/18 2012   Patient Care Overview   IRF Plan of Care Review progress ongoing, continue   Progress, Functional Goals demonstrating adequate progress   Coping/Psychosocial   Plan of Care Reviewed With patient       Problem: Skin Injury Risk (Adult)  Goal: Skin Health and Integrity  Outcome: Ongoing (interventions implemented as appropriate)      Problem: Wound (Includes Pressure Injury) (Adult)  Goal: Signs and Symptoms of Listed Potential Problems Will be Absent, Minimized or Managed (Wound)  Outcome: Ongoing (interventions implemented as appropriate)

## 2018-05-07 NOTE — DISCHARGE INSTR - OTHER ORDERS
[facility] [phone] for PT/OT and nursing 3 x week x 4 weeks.    [facility] [phone] for wound vac, rolling walker and bedside commode.

## 2018-05-07 NOTE — PLAN OF CARE
Problem: Patient Care Overview  Goal: Plan of Care Review   05/07/18 1522   Patient Care Overview   IRF Plan of Care Review progress ongoing, continue   Progress, Functional Goals demonstrating adequate progress   Coping/Psychosocial   Plan of Care Reviewed With patient   OTHER   Outcome Summary Patient continues to require conitnued skilled care per POC for further improved functional mobility an   05/07/18 1522   Patient Care Overview   IRF Plan of Care Review progress ongoing, continue   Progress, Functional Goals demonstrating adequate progress   Coping/Psychosocial   Plan of Care Reviewed With patient   OTHER   Outcome Summary Patient continues to require conitnued skilled care per POC for further improved functional mobility and strengthening.    d strengthening.

## 2018-05-07 NOTE — PROGRESS NOTES
Inpatient Rehabilitation Functional Measures Assessment    Functional Measures  DERREK Eating:  DERREK Grooming: Grooming Score = 5. Patient is supervision/set-up for grooming,  requiring: Setting out grooming equipment. No assistive devices were required.  DERREK Bathing:  DERREK Upper Body Dressing:  DERREK Lower Body Dressing:  DERREK Toileting:    DERREK Bladder Management  Level of Assistance:  Frequency/Number of Accidents this Shift:    DERREK Bowel Management  Level of Assistance:  Frequency/Number of Accidents this Shift:    DERREK Bed/Chair/Wheelchair Transfer:  Bed/chair/wheelchair Transfer Score = 4.  Patient performs 75% or more of effort and minimal assistance (little/incidental  help/lifting of one limb/steadying) for transferring to and from the  bed/chair/wheelchair, requiring: Contact guard. Patient requires the following  assistive device(s): Bed rails.  DERREK Toilet Transfer:  DERREK Tub/Shower Transfer:    Previously Documented Mode of Locomotion at Discharge:  [facility] Expected Mode of Locomotion at Discharge:  DERREK Walk/Wheelchair:  DERREK Stairs:    DERREK Comprehension:  DERREK Expression:  [facility] Social Interaction:  [facility] Problem Solving:  DERREK Memory:    Therapy Mode Minutes  Occupational Therapy: Individual: 90 minutes.  Physical Therapy:  Speech Language Pathology:    Discharge Functional Goals:    Signed by: Tiffanie Bell, Occupational Therapist

## 2018-05-07 NOTE — DISCHARGE INSTR - APPOINTMENTS
FOLLOW-UP WITH DR SONI ON 5-15-18 @ 11:45 AM  [phone]    FOLLOW-UP WITH WOUND CARE ON 5-10-18 @ 8:45 AM   [address]   [address] , [address]

## 2018-05-07 NOTE — PROGRESS NOTES
Inpatient Rehabilitation Functional Measures Assessment    Functional Measures  DERREK Eating:  DERREK Grooming:  DERREK Bathing:  DERREK Upper Body Dressing:  DERREK Lower Body Dressing:  DERREK Toileting:  Activity was not observed.    DERREK Bladder Management  Level of Assistance:  Bladder Score = 5.  Patient is supervision/set-up for  bladder management, requiring: Setting out equipment. Emptying equipment.  Patient requires the following assistive device(s):  Urinal.  Frequency/Number of Accidents this Shift:  Bladder accidents this shift:  2 .    DERREK Bowel Management  Level of Assistance: Activity was not observed.  Frequency/Number of Accidents this Shift:    DERREK Bed/Chair/Wheelchair Transfer:  Activity was not observed.  DERREK Toilet Transfer:  Toilet Transfer was not observed this shift because  patient used bedpan/urinal only.  DERREK Tub/Shower Transfer:  Activity was not observed.    Previously Documented Mode of Locomotion at Discharge:  [facility] Expected Mode of Locomotion at Discharge:  DERREK Walk/Wheelchair:  DERREK Stairs:    DERREK Comprehension:  DERREK Expression:  DERREK Social Interaction:  DERREK Problem Solving:  DERREK Memory:    Therapy Mode Minutes  Occupational Therapy:  Physical Therapy:  Speech Language Pathology:    Discharge Functional Goals:    Signed by: JENI Lemus

## 2018-05-07 NOTE — THERAPY TREATMENT NOTE
Inpatient Rehabilitation - Physical Therapy Treatment Note   Oscar     Patient Name: Carlos Cee  : 1934  MRN: [id number]    Today's Date: 2018                 Admit Date: 2018      Visit Dx:    No diagnosis found.    Patient Active Problem List   Diagnosis   • Debility       Therapy Treatment          IRF Treatment Summary     Row Name 18 1503 18 1222          Evaluation/Treatment Time and Intent    Document Type therapy note (daily note)  -RF therapy note (daily note)  -AB     Mode of Treatment individual therapy;physical therapy  -RF occupational therapy  -AB     Patient/Family Observations Pt agreeable to treatment session with c/o pain and fatigue noted.   -RF Pt. agreeable to therapy, c/o fatigue   -AB     Recorded by [RF] Catie Cooper PTA [AB] Tiffanie Bell, OT     Row Name 18 1503 18 1222          Cognition/Psychosocial- PT/OT    Affect/Mental Status (Cognitive) WFL  -RF WFL  -AB     Orientation Status (Cognition) oriented x 3  -RF  --     Follows Commands (Cognition) WFL  -RF WFL  -AB     Recorded by [RF] Catie Cooper PTA [AB] Tiffanie Bell, OT     Row Name 18 1503             Mobility    Extremity Weight-bearing Status left lower extremity;right lower extremity  -RF      Left Lower Extremity (Weight-bearing Status) weight-bearing as tolerated (WBAT)  -RF      Right Lower Extremity (Weight-bearing Status) weight-bearing as tolerated (WBAT)  -RF      Recorded by [RF] Catie Cooper PTA      Row Name 18 1503 18 1222          Transfer Assessment/Treatment    Transfer Assessment/Treatment bed-chair transfer;chair-bed transfer;sit-stand transfer;stand-sit transfer;stand pivot/stand step transfer  -RF  --     Bed-Chair [place] (Transfers) verbal cues;nonverbal cues (demo/gesture);contact guard  -RF contact guard  -AB     Chair-Bed [place] (Transfers) verbal cues;nonverbal cues (demo/gesture);contact guard  -RF  contact guard  -AB     Assistive Device (Bed-Chair Transfers) walker, front-wheeled  -RF bed rails;wheelchair  -AB     Sit-Stand [place] (Transfers) verbal cues;nonverbal cues (demo/gesture);contact guard  -RF  --     Stand-Sit [place] (Transfers) verbal cues;nonverbal cues (demo/gesture);contact guard  -RF  --     Recorded by [RF] Catie Cooper, NAYANA [AB] Tiffanie Bell, OT     Row Name 05/07/18 1503 05/07/18 1222          Chair-Bed Transfer    Assistive Device (Chair-Bed Transfers) walker, front-wheeled  -RF wheelchair   bed rail  -AB     Recorded by [RF] Catie Cooper PTA [AB] Tiffanie Bell, OT     Row Name 05/07/18 1503             Sit-Stand Transfer    Assistive Device (Sit-Stand Transfers) walker, front-wheeled  -RF      Recorded by [RF] Catie Cooper PTA      Row Name 05/07/18 1503             Stand-Sit Transfer    Assistive Device (Stand-Sit Transfers) walker, front-wheeled  -RF      Recorded by [RF] Catie Cooper, NAYANA      Row Name 05/07/18 1503             Stand Pivot/Stand Step Transfer    Stand Pivot/Stand Step [place] verbal cues;nonverbal cues (demo/gesture);contact guard  -RF      Assistive Device (Stand Pivot Stand Step Transfer) walker, front-wheeled  -RF      Recorded by [RF] Catie Cooper, NAYANA      Row Name 05/07/18 1503             Gait/Stairs Assessment/Training    Gait/Stairs Assessment/Training gait/ambulation independence;gait/ambulation assistive device;distance ambulated;gait pattern;gait deviations  -RF      [place] Level (Gait) verbal cues;nonverbal cues (demo/gesture);contact guard  -RF      Assistive Device (Gait) walker, front-wheeled  -RF      Distance in Feet (Gait) 80X2, 120  -RF      Pattern (Gait) step-to  -RF      Deviations/Abnormal Patterns (Gait) antalgic;andreea decreased;gait speed decreased;stride length decreased   R [place] shoe in place  -RF      Bilateral Gait Deviations forward flexed posture;heel strike decreased  -RF       Maintains Weight-bearing Status (Gait) able to maintain  -RF      Recorded by [RF] Catie Cooper, NAYANA      Row Name 05/07/18 1503 05/07/18 1222          Safety Issues, Functional Mobility    Impairments Affecting Function (Mobility) balance;endurance/activity tolerance;pain;shortness of breath  -RF  --     Comment, Safety Issues/Impairments (Mobility) Precautions: fall risk, DVT, BPE, large incision RLE, [place] (100%inR)  -RF fall precautions, <skin integrity, wound vac RLE  -AB     Recorded by [RF] Catie Cooper, [facility] [AB] Tiffanie Bell, OT     Row Name 05/07/18 1222             Grooming Assessment/Treatment    Grooming [place] Level set up  -AB      Grooming Position supported sitting  -AB      Recorded by [AB] Tiffanie Bell, OT      Row Name 05/07/18 1503             Sensory    Hearing Status hearing impairment, right  -RF      Recorded by [RF] Catie Cooper, [facility]      Row Name 05/07/18 1503             Vision Assessment/Intervention    Visual Impairment/Limitations WFL  -RF      Recorded by [RF] Catie Cooper, [facility]      Row Name 05/07/18 1503             Pain Scale: Numbers Pre/Post-Treatment    Pain Location - Side Right  -RF      Pain Location - Orientation lateral  -RF      Pain Location other (see comments)   lower leg/ wound site  -RF      Recorded by [RF] Catie Cooper, [facility]      Row Name 05/07/18 1503             Pain Scale: FACES Pre/Post-Treatment    Pain: FACES Scale, Pretreatment 4-->hurts little more  -RF      Pain: FACES Scale, Post-Treatment 4-->hurts little more  -RF      Recorded by [RF] Catie Cooper, NAYANA      Row Name 05/07/18 1503             Balance    Balance static sitting balance;static standing balance;dynamic standing balance  -RF      Recorded by [RF] Catie Cooper, [facility]      Row Name 05/07/18 1503             Dynamic Standing Balance    Level of [place], Reaches Outside Midline (Standing, Dynamic Balance) minimal assist, 75% patient effort   -RF      Time Able to Maintain Position, Reaches Outside Midline (Standing, Dynamic Balance) 1 to 2 minutes  -RF      Comment, Reaches Outside Midline (Standing, Dynamic Balance) Bag toss, balloon tap  -RF      Level of [place], Resists Mild Perturbations (Standing, Dynamic Balance) minimal assist, 75% patient effort  -RF      Time Able to Maintain Position, Resists Mild Perturbations (Standing, Dynamic Balance) 1 to 2 minutes  -RF      Comment, Resists Mild Perturbations (Sitting, Dynamic Balance) Some LOB noted.   -RF      Recorded by [RF] Catie Cooper, NAYANA      Row Name 05/07/18 1503             Edema Assessment & Management    Location (Edema) lower extremity, right  -RF      Recorded by [RF] Catie Cooper PTA      Row Name 05/07/18 1503             Edema Symptoms/Interventions    Description (Edema) diffuse;pitting  -RF      Recorded by [RF] Catie Cooper, NAYANA      Row Name 05/07/18 1222             Upper Extremity Seated Therapeutic Exercise    Exercise Type, Seated Upper Extremity (Therapeutic Exercise) AROM (active range of motion)   BUE CoordEx, G/FMC TherEx/Act, strengthening, fxl act ambika   -AB      Expected Outcomes, Seated Upper Extremity (Therapeutic Exercise) improve functional tolerance, self-care activity;improve performance, BADLs  -AB      Sets/Reps Detail, Seated Upper Extremity (Therapeutic Exercise) BUE Wrist Rolls X2  -AB      Recorded by [AB] Tiffanie Bell OT      Row Name 05/07/18 1503             Lower Extremity Standing Therapeutic Exercise    Performed, Standing Lower Extremity (Therapeutic Exercise) heel raises   march, side step  -RF      Device, Standing Lower Extremity (Therapeutic Exercise) parallel bars  -RF      Exercise Type, Standing Lower Extremity (Therapeutic Exercise) AROM (active range of motion)  -RF      Expected Outcomes, Standing Lower Extremity (Therapeutic Exercise) improve functional tolerance, community activity;improve functional tolerance,  household activity;improve functional tolerance, self-care activity;improve performance, gait skills;strengthen, facilitate independent active range of motion  -RF      Sets/Reps Detail, Standing Lower Extremity (Therapeutic Exercise) 2X10  -RF      Recorded by [RF] Catie Cooper PTA      Row Name 05/07/18 1503             Lower Extremity Seated Therapeutic Exercise    Performed, Seated Lower Extremity (Therapeutic Exercise) hip flexion/extension;hip abduction/adduction;knee flexion/extension;ankle dorsiflexion/plantarflexion;LAQ (long arc quad), knee extension;other (see comments)   ball Sqz, ham flexion with RTB  -RF      Device, Seated Lower Extremity (Therapeutic Exercise) elastic bands/tubing;small ball;free weights, cuff   #2  -RF      Exercise Type, Seated Lower Extremity (Therapeutic Exercise) AROM (active range of motion);eccentric contraction;isotonic contraction, concentric  -RF      Expected Outcomes, Seated Lower Extremity (Therapeutic Exercise) improve functional tolerance, community activity;improve functional tolerance, household activity;improve functional tolerance, self-care activity;improve performance, gait skills;strengthen, facilitate independent active range of motion  -RF      Sets/Reps Detail, Seated Lower Extremity (Therapeutic Exercise) 2X10  -RF      Recorded by [RF] Catie Cooper PTA      Row Name 05/07/18 1503 05/07/18 1222          Positioning and Restraints    Pre-Treatment Position in bed  -RF  --     Post Treatment Position wheelchair  -RF bed  -AB     In Bed  -- supine;call light within reach;encouraged to call for assist;side rails up x2;RLE elevated   in AM and PM  -AB     In Wheelchair sitting;with OT  -RF  --     Recorded by [RF] Catie Cooper PTA [AB] Tiffanie Bell OT       User Key  (r) = Recorded By, (t) = Taken By, (c) = Cosigned By    Initials Name Effective Dates    AB Tiffanie Bell OT 04/03/18 -     RF Catie Cooper PTA 03/07/18 -            Wound 04/23/18 1945 Right lateral calf surgical (Active)   Dressing Appearance dry;intact 5/7/2018  9:15 AM       NPWT (Negative Pressure Wound Therapy) 04/25/18 1600 Right Lateral Calf (Active)   Therapy Setting continuous therapy 5/7/2018  9:15 AM   Dressing foam, black 5/7/2018  9:15 AM   Pressure Setting 150 mmHg 5/7/2018  9:15 AM       Physical Therapy Education     Title: PT OT SLP Therapies (Done)     Topic: Physical Therapy (Done)     Point: Mobility training (Done)    Learning Progress Summary     Learner Status Readiness Method Response Comment Documented by    Patient Done Acceptance E VU  RF 05/07/18 1522     Done Acceptance E VU  RF 05/04/18 1517     Done Acceptance E VU  RF 05/03/18 1537     Done Acceptance E,D VU,NR  AG 05/02/18 1753     Done Acceptance E VU  RF 05/01/18 1437     Done Acceptance E VU  RF 04/30/18 1611     Done Acceptance E VU,NR  BE 04/28/18 1340     Done Acceptance E VU  KB 04/27/18 1710     Done Acceptance E,D VU,NR  AG 04/26/18 1635     Done Acceptance E,D VU,NR  AG 04/25/18 1731          Point: Home exercise program (Done)    Learning Progress Summary     Learner Status Readiness Method Response Comment Documented by    Patient Done Acceptance E VU  RF 05/07/18 1522     Done Acceptance E VU  RF 05/04/18 1517     Done Acceptance E VU  RF 05/03/18 1537     Done Acceptance E,D VU,NR  AG 05/02/18 1753     Done Acceptance E VU  RF 05/01/18 1437     Done Acceptance E VU  RF 04/30/18 1611     Done Acceptance E VU,NR  BE 04/28/18 1340     Done Acceptance E VU  KB 04/27/18 1710     Done Acceptance E,D VU,NR  AG 04/26/18 1635     Done Acceptance E,D VU,NR  AG 04/25/18 1731          Point: Body mechanics (Done)    Learning Progress Summary     Learner Status Readiness Method Response Comment Documented by    Patient Done Acceptance E VU  RF 05/07/18 1522     Done Acceptance E VU  RF 05/04/18 1517     Done Acceptance E VU  RF 05/03/18 1537     Done Acceptance E,D VU,NR  AG 05/02/18 1753      Done Acceptance E VU  RF 05/01/18 1437     Done Acceptance E VU  RF 04/30/18 1611     Done Acceptance E VU,NR  BE 04/28/18 1340     Done Acceptance E VU  KB 04/27/18 1710     Done Acceptance E,D VU,NR  AG 04/26/18 1635     Done Acceptance E,D VU,NR  AG 04/25/18 1731          Point: Precautions (Done)    Learning Progress Summary     Learner Status Readiness Method Response Comment Documented by    Patient Done Acceptance E VU  RF 05/07/18 1522     Done Acceptance E VU  RF 05/04/18 1517     Done Acceptance E VU  RF 05/03/18 1537     Done Acceptance E,D VU,NR  AG 05/02/18 1753     Done Acceptance E VU  RF 05/01/18 1437     Done Acceptance E VU  RF 04/30/18 1611     Done Acceptance E VU,NR  BE 04/28/18 1340     Done Acceptance E VU  KB 04/27/18 1710     Done Acceptance E,D VU,NR  AG 04/26/18 1635     Done Acceptance E,D VU,NR  AG 04/25/18 1731                      User Key     Initials Effective Dates Name Provider Type Discipline     04/03/18 -  Agnieszka Cool, PT Physical Therapist PT    BE 04/03/18 -  Lilliana Hyde, PT Physical Therapist PT     03/07/18 -  Catie Cooper, PTA Physical Therapy Assistant PT     12/20/17 -  Kay Pena PTA Physical Therapy Assistant PT                  PT Recommendation and Plan         Plan of Care Reviewed With: (P) patient  IRF Plan of Care Review: (P) progress ongoing, continue  Progress, Functional Goals: (P) demonstrating adequate progress  Outcome Summary: (P) Patient continues to require conitnued skilled care per POC for further improved functional mobility and strengthening.                    Time Calculation:           PT Charges     Row Name 05/07/18 1524             Time Calculation    Start Time 0845  -RF      Stop Time 1015  -RF      Time Calculation (min) 90 min  -RF      PT Received On 05/07/18  -RF      PT - Next Appointment 05/08/18  -RF      PT Goal Re-Cert Due Date 05/09/18  -RF         Time Calculation- PT    Total Timed Code Minutes- PT 90  minute(s)  -RF        User Key  (r) = Recorded By, (t) = Taken By, (c) = Cosigned By    Initials Name Provider Type    RF Catie Cooper PTA Physical Therapy Assistant            Therapy Charges for Today     Code Description Service Date Service Provider Modifiers Qty    67049636703 HC GAIT TRAINING EA 15 MIN 5/7/2018 Catie Cooper, NAYANA GP 2    41751057205 HC PT THERAPEUTIC ACT EA 15 MIN 5/7/2018 Catie Cooper, NAYANA GP 1    70326278005 HC PT THER PROC EA 15 MIN 5/7/2018 Catie Cooper, NAYANA GP 3    62979951364 HC PT THER SUPP EA 15 MIN 5/7/2018 Catie Cooper, NAYANA GP 2                   Catie Cooper PTA  5/7/2018

## 2018-05-07 NOTE — PROGRESS NOTES
Inpatient Rehabilitation Functional Measures Assessment    Functional Measures  DERREK Eating:  DERREK Grooming:  DERREK Bathing:  DERREK Upper Body Dressing:  DERREK Lower Body Dressing:  DERREK Toileting:    DERREK Bladder Management  Level of Assistance:  Frequency/Number of Accidents this Shift:    DERREK Bowel Management  Level of Assistance:  Frequency/Number of Accidents this Shift:    DERREK Bed/Chair/Wheelchair Transfer:  Bed/chair/wheelchair Transfer Score = 4.  Patient performs 75% or more of effort and minimal assistance (little/incidental  help/lifting of one limb/steadying) for transferring to and from the  bed/chair/wheelchair, requiring: Contact guard. Patient requires the following  assistive device(s): Walker. Arm rest. Bed rails.  DERREK Toilet Transfer:  Activity was not observed.  DERREK Tub/Shower Transfer:    Previously Documented Mode of Locomotion at Discharge:  DERREK Expected Mode of Locomotion at Discharge:  DERREK Walk/Wheelchair:  WHEELCHAIR OBSERVATION   Activity was not observed.    WALK OBSERVATION   Walk Distance Scale = 2.  Distance walked is 50 -149 feet. Walk Score = 2.  Patient performs 75% or more of effort and requires minimal assistance.  Incidental assistance, contact guard or steadying was provided. Patient walked a  distance of 120 feet. Patient requires the following assistive device(s):  Rolling walker. CGA  DERREK Stairs:  Activity was not observed.    DERREK Comprehension:  DERREK Expression:  DERREK Social Interaction:  DERREK Problem Solving:  DERREK Memory:    Therapy Mode Minutes  Occupational Therapy:  Physical Therapy: Individual: 90 minutes.  Speech Language Pathology:    Discharge Functional Goals:    Signed by: Catie Cooper PTA

## 2018-05-07 NOTE — PLAN OF CARE
Problem: Patient Care Overview  Goal: Plan of Care Review  Outcome: Ongoing (interventions implemented as appropriate)   05/07/18 1507   Patient Care Overview   IRF Plan of Care Review progress ongoing, continue   Progress, Functional Goals demonstrating adequate progress   Coping/Psychosocial   Plan of Care Reviewed With patient   OTHER   Outcome Summary Pt. tolerated tx well w/ rest breaks provided. He is scheduled to discharge home tomorrow. Continue POC.

## 2018-05-07 NOTE — PROGRESS NOTES
"     LOS: 13 days     Chief Complaint:  Right leg injury    Subjective     Interval History:     He states that overall is doing fairly well.  He has no cough, fevers, chills.  He is tolerating wound VAC.      Objective     Vital Signs  /55   Pulse 61   Temp 97.9 °F (36.6 °C) (Oral)   Resp 20   Ht 177.8 cm (70\")   Wt 109 kg (240 lb)   SpO2 95%   BMI 34.44 kg/m²   Intake & Output (last day)       05/06 0701 - 05/07 0700 05/07 0701 - 05/08 0700    P.O. 1440 240    Total Intake(mL/kg) 1440 (13.2) 240 (2.2)    Net +1440 +240          Unmeasured Urine Occurrence 11 x 1 x    Unmeasured Stool Occurrence 1 x             Physical Exam:     General Appearance:    Alert, cooperative, in no acute distress   Head:    Normocephalic, without obvious abnormality, atraumatic   Eyes:            Lids and lashes normal, conjunctivae and sclerae normal, no   icterus, no pallor, corneas clear, PERRLA       Throat:   No oral lesions, no thrush, oral mucosa moist   Neck:   No adenopathy, supple, trachea midline, no thyromegaly, no   carotid bruit, no JVD   Lungs:     Clear to auscultation,respirations regular, even and                  unlabored    Heart:    Regular rhythm and normal rate, normal S1 and S2, no            murmur, no gallop, no rub, no click   Chest Wall:    No abnormalities observed   Abdomen:     Normal bowel sounds, no masses, no organomegaly, soft        non-tender, non-distended, no guarding, no rebound                tenderness   Extremities:   no edema, no cyanosis, no redness.  Right lower extremity with wound VAC                         Results Review:    Lab Results   Component Value Date    WBC 6.72 05/05/2018    HGB 7.6 (L) 05/05/2018    HCT 25.1 (L) 05/05/2018    MCV 86.3 05/05/2018     05/05/2018       Lab Results   Component Value Date    GLUCOSE 91 05/05/2018    BUN 17 05/05/2018    CREATININE 1.21 05/05/2018    EGFRIFNONA 57 (L) 05/05/2018    BCR 14.0 05/05/2018     (L) 05/05/2018 "    K 4.2 05/05/2018    CL 99 05/05/2018    CO2 23.4 (L) 05/05/2018    CALCIUM 7.6 (L) 05/05/2018    ALBUMIN 2.60 (L) 04/25/2018    LABIL2 0.7 (L) 04/25/2018    AST 39 (H) 04/25/2018    ALT 27 04/25/2018     Lab Results   Component Value Date    INR 1.28 (H) 01/03/2018    INR 1.93 (H) 04/25/2017       Glucose   Date Value Ref Range Status   05/07/2018 154 (H) 70 - 130 mg/dL Final          Medication Review:     Current Facility-Administered Medications:   •  acetaminophen (TYLENOL) tablet 650 mg, 650 mg, Oral, Q4H PRN, Samuel Duane Kreis, MD, 650 mg at 05/07/18 0518  •  albuterol (PROVENTIL) nebulizer solution 0.083% 2.5 mg/3mL, 2.5 mg, Nebulization, Q6H PRN, Samuel Duane Kreis, MD, 2.5 mg at 05/04/18 1137  •  albuterol (PROVENTIL) nebulizer solution 0.083% 2.5 mg/3mL, 2.5 mg, Nebulization, BID - RT, Samuel Duane Kreis, MD, 2.5 mg at 05/07/18 0658  •  alfuzosin (UROXATRAL) 24 hr tablet 10 mg, 10 mg, Oral, Daily, Samuel Duane Kreis, MD, 10 mg at 05/07/18 0908  •  apixaban (ELIQUIS) tablet 5 mg, 5 mg, Oral, Q12H, Bobbi Moses, [facility], 5 mg at 05/07/18 0908  •  bisacodyl (DULCOLAX) suppository 10 mg, 10 mg, Rectal, Daily PRN, Samuel Duane Kreis, MD  •  carboxymethylcellulose (REFRESH PLUS) 0.5 % ophthalmic solution 2 drop, 2 drop, Both Eyes, TID PRN, Samuel Duane Kreis, MD, 2 drop at 05/07/18 0908  •  carvedilol (COREG) tablet 12.5 mg, 12.5 mg, Oral, Q12H, Samuel Duane Kreis, MD, 12.5 mg at 05/07/18 0906  •  cholecalciferol (VITAMIN D3) tablet 1,000 Units, 1,000 Units, Oral, Daily, Samuel Duane Kreis, MD, 1,000 Units at 05/07/18 0907  •  fluticasone (FLONASE) 50 MCG/ACT nasal spray 2 spray, 2 spray, Each Nare, Daily, Samuel Duane Kreis, MD, 2 spray at 05/05/18 0945  •  furosemide (LASIX) tablet 40 mg, 40 mg, Oral, Daily, Samuel Duane Kreis, MD, 40 mg at 05/07/18 0907  •  guaiFENesin (MUCINEX) 12 hr tablet 600 mg, 600 mg, Oral, Q12H, Samuel Duane Kreis, MD, 600 mg at 05/07/18 0907  •  ketotifen (ZADITOR) 0.025 %  ophthalmic solution 1 drop, 1 drop, Both Eyes, BID, Samuel Duane Kreis, MD, 1 drop at 05/07/18 0908  •  levothyroxine (SYNTHROID, LEVOTHROID) tablet 150 mcg, 150 mcg, Oral, Q AM, Samuel Duane Kreis, MD, 150 mcg at 05/07/18 0519  •  magnesium hydroxide (MILK OF MAGNESIA) suspension 2400 mg/10mL 10 mL, 10 mL, Oral, Daily PRN, Samuel Duane Kreis, MD, 10 mL at 05/06/18 2054  •  meclizine (ANTIVERT) tablet 12.5 mg, 12.5 mg, Oral, BID, Samuel Duane Kreis, MD, 12.5 mg at 05/07/18 0907  •  mometasone-formoterol (DULERA 100) inhaler 2 puff, 2 puff, Inhalation, BID, Samuel Duane Kreis, MD, 2 puff at 05/07/18 0909  •  NIFEdipine CC (ADALAT CC) 24 hr tablet 30 mg, 30 mg, Oral, Q24H, Samuel Duane Kreis, MD, 30 mg at 05/07/18 0907  •  ondansetron (ZOFRAN) tablet 4 mg, 4 mg, Oral, Q6H PRN **OR** ondansetron ODT (ZOFRAN-ODT) disintegrating tablet 4 mg, 4 mg, Oral, Q6H PRN **OR** ondansetron (ZOFRAN) injection 4 mg, 4 mg, Intravenous, Q6H PRN, Samuel Duane Kreis, MD  •  pantoprazole (PROTONIX) EC tablet 40 mg, 40 mg, Oral, BID AC, Samuel Duane Kreis, MD, 40 mg at 05/07/18 0907  •  polyethylene glycol 3350 powder (packet), 17 g, Oral, Daily PRN, Samuel Duane Kreis, MD  •  potassium chloride (K-DUR,KLOR-CON) ER tablet 10 mEq, 10 mEq, Oral, Daily, Samuel Duane Kreis, MD, 10 mEq at 05/07/18 0907  •  probenecid (BENEMID) tablet 500 mg, 500 mg, Oral, Daily, Samuel Duane Kreis, MD, 500 mg at 05/07/18 0908  •  vitamin B-12 (CYANOCOBALAMIN) tablet 1,000 mcg, 1,000 mcg, Oral, Daily, Samuel Duane Kreis, MD, 1,000 mcg at 05/07/18 0908      Assessment/Plan     Debility  Pulmonary embolism  Right lower extremity DVT with right lower extremity hematoma status post evacuation now with wound VAC  Hyponatremia  COPD  Hypertension  Chronic diastolic congestive heart failure  Chronic kidney disease stage III  Anemia with heme positive stools       Plan:  PT and OT ongoing     Full dose anticoagulation with Eliquis     He does have hypertension.   Continue nifedipine, carvedilol and monitor closely.      Continue Dulera twice a day    Stool was positive for occult blood but he has no obvious bleeding and hemoglobin is been relatively stable.  Withholding anticoagulation with acute right lower extremity DVT would seem to be riskier than continue with anticoagulation with close monitoring his hemoglobin.    Hemoglobin has been stable      Samuel Duane Kreis, MD  05/07/18  3:17 PM

## 2018-05-07 NOTE — SIGNIFICANT NOTE
05/07/18 1056   Plan   Plan SS contacted [facility] [phone] per Marcia with referral, discharge on 5-8-18 and orders for PT 3 x wk x 4wks for strengthening, endurance, gait training, transfer training, balance, therapeutic exercise, bed mobility, home safety, ROM, steps/stairs, OT 3 x wk x 4 wks for ADL re-training, home safety, functional mobility, strengthening, nursing 3 x wk x 4 wks for wound vac RLE-change dressing on Mondays, Wednesdays, Fridays, and aide 3 x week x 4 weeks for bathing/personal care.  Marcia says pt will not be able to receive aide services due to therapy and nursing needs and reimbursement from insurance.  Faxed face sheet, H&P, PT/OT/SLP notes, labs, wound care nurse note, MD progress notes and  referral with orders to  [phone].  SS reviewed this information with valarie [phone] who voiced understanding.  Valarie agreeable to Cassie for DME due to pt receiving wound vac from this provider.  Contacted [facility] [phone] per preference per Ursula with discharge on 5-8-18 and orders for rolling walker and bedside commode.  Faxed DWO, face sheet, H&P, MD progress notes, PT and OT notes to [phone].  DME to be delivered to pt's home today.  Valarie plans to bring RW to rehab on 5-8-18.  Wene will be present for discharge and transportation home.   Patient/Family in Agreement with Plan yes

## 2018-05-07 NOTE — PROGRESS NOTES
Inpatient Rehabilitation Functional Measures Assessment    Functional Measures  DERREK Eating:  DERREK Grooming:  DERREK Bathing:  DERREK Upper Body Dressing:  DERREK Lower Body Dressing:  DERREK Toileting:    DERREK Bladder Management  Level of Assistance:  Frequency/Number of Accidents this Shift:    DERREK Bowel Management  Level of Assistance:  Frequency/Number of Accidents this Shift:    DERREK Bed/Chair/Wheelchair Transfer:  DERREK Toilet Transfer:  DERREK Tub/Shower Transfer:    Previously Documented Mode of Locomotion at Discharge:  DERREK Expected Mode of Locomotion at Discharge:  DERREK Walk/Wheelchair:  DERREK Stairs:    DERREK Comprehension:  Both ( auditory and visual) modes of comprehension are used  equally. Comprehension Score = 6, Modified [place].  Patient comprehends  complex/abstract information in their primary language, requiring: Glasses. HARD  OF HEARING  DERREK Expression:  Vocal expression is the usual mode. Expression Score = 7,  Independent.  Patient expresses complex/abstract information in their primary  language.  Patient is completely independent for vocal expression.  There are no  activity limitations.  DERREK Social Interaction:  Social Interaction Score = 7, Independent. Patient is  completely independent for social interaction.  There are no activity  limitations.  DERREK Problem Solving:  Problem Solving Score = 7, Independent.  Patient makes  appropriate decisions in order to solve complex problems.  Patient is completely  independent for problem solving.  There are no activity limitations.  DERREK Memory:  Memory Score = 7, Independent.  Patient is completely independent  for memory.  There are no activity limitations.    Therapy Mode Minutes  Occupational Therapy:  Physical Therapy:  Speech Language Pathology:    Discharge Functional Goals:    Signed by: Ashlyn Eller Nurse

## 2018-05-07 NOTE — PROGRESS NOTES
Inpatient Rehabilitation Plan of Care Note    Plan of Care    Signed by: Lori Alberts, Physical Therapist

## 2018-05-08 VITALS
BODY MASS INDEX: 34.36 KG/M2 | OXYGEN SATURATION: 97 % | HEIGHT: 70 IN | HEART RATE: 64 BPM | SYSTOLIC BLOOD PRESSURE: 119 MMHG | WEIGHT: 240 LBS | RESPIRATION RATE: 20 BRPM | DIASTOLIC BLOOD PRESSURE: 59 MMHG | TEMPERATURE: 98.2 F

## 2018-05-08 PROCEDURE — 94799 UNLISTED PULMONARY SVC/PX: CPT

## 2018-05-08 PROCEDURE — 97530 THERAPEUTIC ACTIVITIES: CPT

## 2018-05-08 PROCEDURE — 97535 SELF CARE MNGMENT TRAINING: CPT

## 2018-05-08 RX ORDER — NIFEDIPINE 30 MG/1
30 TABLET, FILM COATED, EXTENDED RELEASE ORAL
Qty: 30 TABLET | Refills: 0 | Status: ON HOLD | OUTPATIENT
Start: 2018-05-08 | End: 2018-07-31

## 2018-05-08 RX ORDER — GUAIFENESIN 600 MG/1
600 TABLET, EXTENDED RELEASE ORAL EVERY 12 HOURS SCHEDULED
Qty: 60 TABLET | Refills: 0 | Status: ON HOLD | OUTPATIENT
Start: 2018-05-08 | End: 2018-07-31

## 2018-05-08 RX ADMIN — PROBENECID 500 MG: 500 TABLET, FILM COATED ORAL at 08:56

## 2018-05-08 RX ADMIN — ALBUTEROL SULFATE 2.5 MG: 2.5 SOLUTION RESPIRATORY (INHALATION) at 07:41

## 2018-05-08 RX ADMIN — FUROSEMIDE 40 MG: 40 TABLET ORAL at 08:57

## 2018-05-08 RX ADMIN — KETOTIFEN FUMARATE 1 DROP: 0.35 SOLUTION/ DROPS OPHTHALMIC at 08:57

## 2018-05-08 RX ADMIN — Medication 1000 MCG: at 08:57

## 2018-05-08 RX ADMIN — GUAIFENESIN 600 MG: 600 TABLET, EXTENDED RELEASE ORAL at 08:57

## 2018-05-08 RX ADMIN — CARVEDILOL 12.5 MG: 6.25 TABLET, FILM COATED ORAL at 08:56

## 2018-05-08 RX ADMIN — CHOLECALCIFEROL TAB 10 MCG (400 UNIT) 1000 UNITS: 10 TAB at 08:57

## 2018-05-08 RX ADMIN — POTASSIUM CHLORIDE 10 MEQ: 750 TABLET, FILM COATED, EXTENDED RELEASE ORAL at 08:57

## 2018-05-08 RX ADMIN — LEVOTHYROXINE SODIUM 150 MCG: 150 TABLET ORAL at 06:01

## 2018-05-08 RX ADMIN — MECLIZINE HCL 12.5 MG: 12.5 TABLET ORAL at 08:57

## 2018-05-08 RX ADMIN — ALFUZOSIN HYDROCHLORIDE 10 MG: 10 TABLET, EXTENDED RELEASE ORAL at 08:56

## 2018-05-08 RX ADMIN — PANTOPRAZOLE SODIUM 40 MG: 40 TABLET, DELAYED RELEASE ORAL at 08:57

## 2018-05-08 RX ADMIN — APIXABAN 5 MG: 5 TABLET, FILM COATED ORAL at 08:57

## 2018-05-08 RX ADMIN — NIFEDIPINE 30 MG: 30 TABLET, EXTENDED RELEASE ORAL at 08:57

## 2018-05-08 NOTE — PROGRESS NOTES
Inpatient Rehabilitation Functional Measures Assessment    Functional Measures  DERREK Eating:  DERREK Grooming:  DERREK Bathing:  DERREK Upper Body Dressing:  DERREK Lower Body Dressing:  DERREK Toileting:    DERREK Bladder Management  Level of Assistance:  Bladder Score = 5.  Patient is supervision/set-up for  bladder management, requiring: Setting out equipment. Emptying equipment.  Patient requires the following assistive device(s):  Urinal.  Frequency/Number of Accidents this Shift:    DERREK Bowel Management  Level of Assistance:  Frequency/Number of Accidents this Shift:    [facility] Bed/Chair/Wheelchair Transfer:  [facility] Toilet Transfer:  [facility] Tub/Shower Transfer:    Previously Documented Mode of Locomotion at Discharge:  [facility] Expected Mode of Locomotion at Discharge:  [facility] Walk/Wheelchair:  [facility] Stairs:    [facility] Comprehension:  [facility] Expression:  [facility] Social Interaction:  [facility] Problem Solving:  [facility] Memory:    Therapy Mode Minutes  Occupational Therapy:  Physical Therapy:  Speech Language Pathology:    Discharge Functional Goals:    Signed by: JENI Nobles

## 2018-05-08 NOTE — SIGNIFICANT NOTE
05/08/18 1050   Plan   Plan SS contacted [facility] [phone] per Marcia about discharge.   nurse will start care on 5-9-18; pt's wound vac is due to be changed on 5-9-18.  Faxed discharge summary with face to face to  [phone].  SS reviewed this information with pt and jason.  Informed pt and damire per Pharmacy note co-pay for Eliquis is $28 who are agreeable.  Pt is going home with jason today and will receive 24 hour assistance.  Provided [employer] aide and attendance pension benefit form completed by MD to jason.  Jason will provide transportation home.     Patient/Family in Agreement with Plan yes   Final Discharge Disposition Code 06 - home with home health care

## 2018-05-08 NOTE — PROGRESS NOTES
Rehabilitation Nursing  Inpatient Rehabilitation Functional Measures Assessment and Plan of Care    Plan of Care/Interventions  Copy from Rufina    Pain Management (Active)  Current Status (5/5/2018 12:00:00 AM): impaired comfort r/t surgical wound  Weekly Goal: state decrease of pain  Discharge Goal: identify measures to control pain    Body Function Structure    Balance (Active)  Current Status (5/5/2018 12:00:00 AM): impaired physical mobility  Weekly Goal: increase in physical activity  Discharge Goal: demonstrate use of adaptive equipment    Safety    Potential for Injury (Active)  Current Status (4/24/2018 12:00:00 AM): risk for injury r/t falls  Weekly Goal: free from injury this shift  Discharge Goal: free from injury during hospital stay    Functional Measures  DERREK Eating:  DERREK Grooming:  DERREK Bathing:  DERREK Upper Body Dressing:  DERREK Lower Body Dressing:  DERREK Toileting:    DERREK Bladder Management  Level of Assistance:  Frequency/Number of Accidents this Shift:    DERREK Bowel Management  Level of Assistance:  Frequency/Number of Accidents this Shift:    DERREK Bed/Chair/Wheelchair Transfer:  DERREK Toilet Transfer:  DERREK Tub/Shower Transfer:    Previously Documented Mode of Locomotion at Discharge:  [facility] Expected Mode of Locomotion at Discharge:  DERREK Walk/Wheelchair:  DERREK Stairs:    DERREK Comprehension:  Both ( auditory and visual) modes of comprehension are used  equally. Comprehension Score = 6, Modified [place].  Patient comprehends  complex/abstract information in their primary language, requiring: Additional  time.  DERREK Expression:  Both ( vocal and non-vocal) modes of expression are used  equally. Expression Score = 6,  Modified Independent. Patient expresses  complex/abstract information in their primary language, requiring: Additional  time.  DERREK Social Interaction:  Social Interaction Score = 6, Modified Independent.  Patient is modified independent for social interaction, requiring: Requires  additional  time.  DERREK Problem Solving:  Problem Solving Score = 6, Modified [place].  Patient  makes appropriate decisions in order to solve complex problems, but requires  extra time.  DERREK Memory:  Memory Score = 6, Modified [place].  Patient is modified  independent for memory, requiring: Requires additional time.    Signed by: Brooklyn Dougherty, Nurse

## 2018-05-08 NOTE — PROGRESS NOTES
Rehabilitation Nursing  Inpatient Rehabilitation Plan of Care Note    Plan of Care  Copy from Rufina    Pain Management (Active)  Current Status (5/5/2018 12:00:00 AM): impaired comfort r/t surgical wound  Weekly Goal: state decrease of pain  Discharge Goal: identify measures to control pain    Body Function Structure    Balance (Active)  Current Status (5/5/2018 12:00:00 AM): impaired physical mobility  Weekly Goal: increase in physical activity  Discharge Goal: demonstrate use of adaptive equipment    Safety    Potential for Injury (Active)  Current Status (4/24/2018 12:00:00 AM): risk for injury r/t falls  Weekly Goal: free from injury this shift  Discharge Goal: free from injury during hospital stay    Signed by: Janice Buckley Nurse

## 2018-05-08 NOTE — PROGRESS NOTES
Inpatient Rehabilitation Functional Measures Assessment    Functional Measures  DERREK Eating:  Eating Score = 6. Patient is modified independent for eating,  requiring:  DERREK Grooming: Grooming Score = 5. Patient is supervision/set-up for grooming,  requiring: Setting out grooming equipment. Initial preparation. Stand by  assistance. No assistive devices were required.  DERREK Bathing:  Patient bathed in bed. Patient requires no physical assistance for  washing, rinsing, and drying the right arm. Patient requires no physical  assistance for washing, rinsing, and drying the left arm. Patient requires no  physical assistance for washing, rinsing, and drying the chest. Patient requires  no physical assistance for washing, rinsing, and drying the abdomen. Patient  requires no physical assistance for washing, rinsing, and drying the perineal  area. Patient requires total assistance for washing, rinsing, or drying the  buttocks. Patient requires no physical assistance for washing, rinsing, and  drying the right upper leg. Patient requires no physical assistance for washing,  rinsing, and drying the left upper leg. Patient requires total assistance for  washing, rinsing, or drying the right lower leg, including the foot. Patient  requires no physical assistance for washing, rinsing, and drying the left lower  leg, including the foot. Patient performs 74 % of bathing tasks. Bathing Score =  3, Moderate Assistance. Patient requires the following assistive device(s): Grab  bar/arm rest to maintain balance. Long handled sponge.  DERREK Upper Body Dressing:  Upper Body Dressing Score = 5. Patient is supervision  for upper body dressing, requiring: Gathering/setting out clothes. Stand by  assistance. No assistive devices were required.  DERREK Lower Body Dressing:  Gathering clothes was not observed for this patient.  Wearing underwear or an undergarment was not observed for this patient. Patient  requires total assistance for holding  clothing and/or threading the right leg  through the pants/skirt. Patient requires no physical assistance for donning  and/or doffing pants/skirt threading left leg. Patient requires no physical  assistance for donning and/or doffing pants/skirt over hips and adjusting  fastener. Patient requires total assistance for holding clothing and/or donning  and/or doffing right sock. Patient requires moderate/considerable physical  assistance for donning and/or doffing left sock. Patient requires total  assistance for holding clothing and/or donning and/or doffing right shoe.  Patient requires total assistance for holding clothing and/or donning and/or  doffing left shoe. Patient performs 35.71 % of lower body dressing tasks. Lower  Body Dressing Score = 2, Maximal Assistance. No assistive devices were required.    DERREK Toileting:  Patient requires no physical assistance for adjusting clothing  before using a toilet, commode, bedpan, or urinal. Patient requires total  assistance for hygiene. Patient requires no physical assistance for adjusting  clothing after using a toilet, commode, bedpan, or urinal. Patient performs  66.67 % of toileting tasks. Toileting Score = 3, Moderate Assistance. Patient  requires the following assistive device(s): Grab bar.    DERREK Bladder Management  Level of Assistance:  Frequency/Number of Accidents this Shift:    DERREK Bowel Management  Level of Assistance:  Frequency/Number of Accidents this Shift:    DERREK Bed/Chair/Wheelchair Transfer:  DERREK Toilet Transfer:  Toilet Transfer Score = 4.  Patient performs 75% or more  of effort and minimal assistance (little/incidental help/steadying) for  transferring to and from the toilet/commode, requiring: Contact guard. Patient  requires the following assistive device(s): Safety frame/over the toilet. Grab  bars.  DERREK Tub/Shower Transfer:  Shower Transfer did not occur because activity was  contraindicated for patient.    Previously Documented Mode of  Locomotion at Discharge:  DERREK Expected Mode of Locomotion at Discharge:  DERREK Walk/Wheelchair:  DERREK Stairs:    DERREK Comprehension:  DERREK Expression:  DERREK Social Interaction:  DERREK Problem Solving:  DERREK Memory:    Therapy Mode Minutes  Occupational Therapy: Individual: 10 minutes.  Physical Therapy:  Speech Language Pathology:    Discharge Functional Goals:    Signed by: Tiffanie Bell, Occupational Therapist

## 2018-05-08 NOTE — PROGRESS NOTES
Patient Assessment Instrument  Quality Indicators - Discharge    Section GG. Self-Care Performance      Section GG. Mobility Performance     Roll Left and Right: Sutherland provides verbal cues or touching/steadying  assistance as patient completes activity.   Sit to Lying: [place] provides verbal cues or touching/steadying assistance as  patient completes activity.   Lying to Sitting on Side of Bed: [place] provides verbal cues or  touching/steadying assistance as patient completes activity.   Sit to Stand: [place] provides verbal cues or touching/steadying assistance as  patient completes activity.   Chair/Bed to Chair Transfer: [place] provides verbal cues or touching/steadying  assistance as patient completes activity.   Toilet Transfer [place] provides verbal cues or touching/steadying assistance as  patient completes activity.   Car Transfer: Not attempted due to medical or safety concerns.    Patient Walks:  Yes   Walk 10 Feet: [place] provides verbal cues or touching/steadying assistance as  patient completes activity.   Walk 50 Feet With 2 Turns: [place] provides verbal cues or touching/steadying  assistance as patient completes activity.   Walk 150 Feet: [place] provides verbal cues or touching/steadying assistance as  patient completes activity.   Walking 10 Feet on Uneven Surfaces: Not attempted due to medical or safety  concerns.   1 Step Over Curb or Up/Down Stair: Not attempted due to medical or safety  concerns.   4 Steps Up and Down, With/Without Rail: Not attempted due to medical or safety  concerns.   12 Steps Up and Down, With/Without Rail: Not attempted due to medical or safety  concerns.   Picking up an Object: Not attempted due to medical or safety concerns.     Uses Wheelchair/Scooter: Yes  Wheel 50 Feet with Two Turns: Not attempted due to medical or safety concerns.  Type of Wheelchair/Scooter Used: Manual  Wheel 150 Feet: Not attempted due to medical or safety concerns.  Type of Wheelchair/Scooter  Used: Manual    Section J. Health Conditions Discharge      Section M. Skin Conditions Discharge      . Current Number of Unhealed Pressure Ulcers      . Worsening in Pressure Ulcer Status Since Admission      . Healed Pressure Ulcer(s)  (Voluntary)      Section O. Special Treatments, Procedures, and Programs Discharge  . Influenza Vaccine - Discharge      Date Influenza Vaccine Received (enter date ONLY if received in this unit;  otherwise, skip this entry)      OPTIONAL BRANCH FOR UNPLANNED DISCHARGES  Unplanned Discharge:    Signed by: Catie Cooper PTA

## 2018-05-08 NOTE — SIGNIFICANT NOTE
05/08/18 1600   PT Discharge Summary   Expected Discharge Disposition (PT Eval) home with home health care   Reason for Discharge Discharge from facility   Outcomes Achieved Patient able to partially acheive established goals   Discharge Destination Home with home health

## 2018-05-08 NOTE — PLAN OF CARE
Problem: Patient Care Overview  Goal: Plan of Care Review  Outcome: Ongoing (interventions implemented as appropriate)   05/08/18 1020   Patient Care Overview   IRF Plan of Care Review progress ongoing, continue   Progress, Functional Goals demonstrating adequate progress   Coping/Psychosocial   Plan of Care Reviewed With patient       Problem: Skin Injury Risk (Adult)  Goal: Skin Health and Integrity  Outcome: Ongoing (interventions implemented as appropriate)      Problem: Wound (Includes Pressure Injury) (Adult)  Goal: Signs and Symptoms of Listed Potential Problems Will be Absent, Minimized or Managed (Wound)  Outcome: Ongoing (interventions implemented as appropriate)

## 2018-05-08 NOTE — PROGRESS NOTES
Patient Assessment Instrument  Quality Indicators - Discharge    Section GG. Self-Care Performance     Eating: Patient completed the activities by him/herself with no assistance from  a helper.   Oral Hygiene: Hunter sets up or cleans up; patient completes activity. Hunter  assists only prior to or following the activity.   Toileting Hygiene: : Hunter does less than half the effort. Hunter lifts, holds  or supports trunk or limbs but provides less than half the effort.   Shower/Bathe Self: Hunter does less than half the effort. Hunter lifts, holds  or supports trunk or limbs but provides less than half the effort.   Upper Body Dressing: Hunter sets up or cleans up; patient completes activity.  Hunter assists only prior to or following the activity.   Lower Body Dressing: Hunter does more than half the effort. Hunter lifts or  holds trunk or limbs and provides more than half the effort.   Putting On/Taking Off Footwear: Hunter does more than half the effort. Hunter  lifts or holds trunk or limbs and provides more than half the effort.    Section GG. Mobility Performance      Section J. Health Conditions Discharge      Section M. Skin Conditions Discharge      . Current Number of Unhealed Pressure Ulcers      . Worsening in Pressure Ulcer Status Since Admission      . Healed Pressure Ulcer(s)  (Voluntary)      Section O. Special Treatments, Procedures, and Programs Discharge  . Influenza Vaccine - Discharge      Date Influenza Vaccine Received (enter date ONLY if received in this unit;  otherwise, skip this entry)      OPTIONAL BRANCH FOR UNPLANNED DISCHARGES  Unplanned Discharge:    Signed by: Tiffanie Bell, Occupational Therapist

## 2018-05-08 NOTE — THERAPY TREATMENT NOTE
Inpatient Rehabilitation - Occupational Therapy Treatment Note     Oscar     Patient Name: Carlos Cee  : 1934  MRN: [id number]    Today's Date: 2018                 Admit Date: 2018      Visit Dx:    ICD-10-CM ICD-9-CM   1. Debility R53.81 799.3       Patient Active Problem List   Diagnosis   • Debility         Therapy Treatment          IRF Treatment Summary     Row Name 18 1539             Evaluation/Treatment Time and Intent    Document Type discharge evaluation/summary  -AB      Mode of Treatment occupational therapy  -AB      Recorded by [AB] Tiffanie Bell, OT      Row Name 18 1539             Cognition/Psychosocial- PT/OT    Affect/Mental Status (Cognitive) WFL  -AB      Orientation Status (Cognition) oriented x 4  -AB      Follows Commands (Cognition) WFL  -AB      Recorded by [AB] Tiffanie Bell, OT      Row Name 18 1539             Transfer Assessment/Treatment    Comment (Transfers) CGA  -AB      Recorded by [AB] Tiffanie Bell OT      Row Name 18 1539             Safety Issues, Functional Mobility    Comment, Safety Issues/Impairments (Mobility) fall precautions, <skin integrity, wound vac RLE  -AB      Recorded by [AB] Tiffanie Bell, OT      Row Name 18 1539             Bathing Assessment/Treatment    Comment (Bathing) ModA/Maurizio  -AB      Recorded by [AB] Tiffanie Bell, OT      Row Name 18 1539             Upper Body Dressing Assessment/Treatment    Comment (Upper Body Dressing) Set-Up  -AB      Recorded by [AB] Tiffanie Bell OT      Row Name 18 1539             Lower Body Dressing Assessment/Treatment    Comment (Lower Body Dressing) MaxA/ModA  -AB      Recorded by [AB] Tiffanie Bell OT      Row Name 18 1539             Grooming Assessment/Treatment    Comment (Grooming) Set-UP  -AB      Recorded by [AB] Tiffanie Bell OT      Row Name 18 1539              Toileting Assessment/Treatment    Comment (Toileting) ModA  -AB      Recorded by [AB] Tiffanie Bell OT      Row Name 05/08/18 1539             Self-Feeding Assessment/Treatment    Comment (Self-Feeding) Mod. I  -AB      Recorded by [AB] Tiffanie Bell OT      Row Name 05/08/18 1539             Positioning and Restraints    Post Treatment Position wheelchair  -AB      In Wheelchair sitting;call light within reach;encouraged to call for assist;with family/caregiver  -AB      Recorded by [AB] Tiffanie Bell OT        User Key  (r) = Recorded By, (t) = Taken By, (c) = Cosigned By    Initials Name Effective Dates    AB Tiffanie Bell OT 04/03/18 -           Wound 04/23/18 1945 Right lateral calf surgical (Active)   Dressing Appearance dry;intact 5/7/2018  8:00 PM       NPWT (Negative Pressure Wound Therapy) 04/25/18 1600 Right Lateral Calf (Active)   Therapy Setting continuous therapy 5/7/2018  8:00 PM   Dressing foam, black 5/7/2018  8:00 PM         OT Recommendation and Plan    Anticipated Equipment Needs At Discharge (OT Eval):  (TBD)  Planned Therapy Interventions (OT Eval): activity tolerance training, adaptive equipment training, BADL retraining, ROM/therapeutic exercise, strengthening exercise, transfer/mobility retraining    Plan of Care Review  Plan of Care Reviewed With: patient, spouse  Plan of Care Reviewed With: patient, spouse  IRF Plan of Care Review: discharged  Progress, Functional Goals: preparing for discharge  Outcome Summary: Pt. discharged from physical rehabilitation to home w/ family assisting. Both verbally expressed understanding of pts. need for assistance w/ ADL's, fxl transfers, and recommendation of 24 hour supervision for safety/assistance. Recommend BS for safety w/ ADL's and fxl transfers.  referral made for continued skilled OT services.           OT IRF GOALS     Row Name 05/08/18 1535 05/02/18 1244 05/02/18 1242       Transfer Goal 1 (OT-IRF)     Progress/Outcomes (Transfer Goal 1, OT-IRF) goal partially met  -AB  --  --       Bathing Goal 1 (OT-IRF)    Activity/Device (Bathing Goal 1, OT-IRF)  -- bathing skills, all  -AB  --    [place] Level (Bathing Goal 1, OT-IRF)  -- moderate assist (50-74% patient effort);minimum assist (75% or more patient effort)  -AB  --    Time Frame (Bathing Goal 1, OT-IRF)  -- short term goal (STG);5 - 7 days  -AB  --    Progress/Outcomes (Bathing Goal 1, OT-IRF) goal met  -AB  -- goal met  -AB       Bathing Goal 2 (OT-IRF)    Progress/Outcomes (Bathing Goal 2, OT-IRF) goal not met  -AB  --  --       LB Dressing Goal 1 (OT-IRF)    Activity/Device (LB Dressing Goal 1, OT-IRF)  -- lower body dressing  -AB  --    [place] (LB Dressing Goal 1, OT-IRF)  -- maximum assist (25-49% patient effort);moderate assist (50-74% patient effort)  -AB  --    Time Frame (LB Dressing Goal 1, OT-IRF)  -- short term goal (STG);5 - 7 days  -AB  --    Progress/Outcomes (LB Dressing Goal 1, OT-IRF) goal met  -AB  -- goal met  -AB       LB Dressing Goal 2 (OT-IRF)    Progress/Outcomes (LB Dressing Goal 2, OT-IRF) goal not met  -AB  --  --       Toileting Goal 1 (OT-IRF)    Progress/Outcomes (Toileting Goal 1, OT-IRF) goal not met  -AB  --  --    Row Name 04/25/18 1144             Transfer Goal 1 (OT-IRF)    Activity/Assistive Device (Transfer Goal 1, OT-IRF) bed-to-chair/chair-to-bed;toilet  -AB      [place] Level (Transfer Goal 1, OT-IRF) standby assist  -AB      Time Frame (Transfer Goal 1, OT-IRF) long term goal (LTG);by discharge  -AB         Bathing Goal 1 (OT-IRF)    Activity/Device (Bathing Goal 1, OT-IRF) bathing skills, all  -AB      [place] Level (Bathing Goal 1, OT-IRF) moderate assist (50-74% patient effort);verbal cues required  -AB      Time Frame (Bathing Goal 1, OT-IRF) short term goal (STG);5 - 7 days  -AB         Bathing Goal 2 (OT-IRF)    Activity/Device (Bathing Goal 2, OT-IRF) bathing skills, all  -AB       [place] Level (Bathing Goal 2, OT-IRF) minimum assist (75% or more patient effort);verbal cues required  -AB      Time Frame (Bathing Goal 2, OT-IRF) long term goal (LTG);by discharge  -AB         LB Dressing Goal 1 (OT-IRF)    Activity/Device (LB Dressing Goal 1, OT-IRF) lower body dressing  -AB      [place] (LB Dressing Goal 1, OT-IRF) maximum assist (25-49% patient effort)  -AB      Time Frame (LB Dressing Goal 1, OT-IRF) short term goal (STG);5 - 7 days  -AB         LB Dressing Goal 2 (OT-IRF)    Activity/Device (LB Dressing Goal 2, OT-IRF) lower body dressing  -AB      [place] (LB Dressing Goal 2, OT-IRF) moderate assist (50-74% patient effort)  -AB      Time Frame (LB Dressing Goal 2, OT-IRF) long term goal (LTG);by discharge  -AB         Toileting Goal 1 (OT-IRF)    [place] Level (Toileting Goal 1, OT-IRF) minimum assist (75% or more patient effort)  -AB      Time Frame (Toileting Goal 1, OT-IRF) long term goal (LTG);by discharge  -AB        User Key  (r) = Recorded By, (t) = Taken By, (c) = Cosigned By    Initials Name Provider Type    AB Tiffanie Bell OT Occupational Therapist                 Time Calculation:           Time Calculation- OT     Row Name 05/08/18 1537             Time Calculation- OT    Total Timed Code Minutes- OT 10 minute(s)  -AB      OT Non-Billable Time (min) 35 min  -AB        User Key  (r) = Recorded By, (t) = Taken By, (c) = Cosigned By    Initials Name Provider Type    AB Tiffanie Bell OT Occupational Therapist             Therapy Charges for Today     Code Description Service Date Service Provider Modifiers Qty    63525697244 HC OT THERAPEUTIC ACT EA 15 MIN 5/7/2018 Tiffanie Bell OT GO 5    70963265142 HC OT SELF CARE/MGMT/TRAIN EA 15 MIN 5/7/2018 Tiffanie Bell OT GO 1    16421111413 HC OT THER SUPP EA 15 MIN 5/7/2018 Tiffanie Bell OT GO 1    84084959193 HC OT SELF CARE/MGMT/TRAIN EA 15 MIN 5/8/2018 Tiffanie  Heidi Bell, OT GO 1                   Tiffanie Bell, OT  5/8/2018

## 2018-05-08 NOTE — PROGRESS NOTES
Inpatient Rehabilitation Functional Measures Assessment    Functional Measures  DERREK Eating:  Eating Score = 5. Patient is supervision/set-up for eating,  requiring: Opening containers. Buttering bread. Cutting meat. No assistive  devices were required.  DERREK Grooming: Grooming Score = 5. Patient is supervision/set-up for grooming,  requiring: No assistive devices were required.  DERREK Bathing:  Patient bathed in bed. Patient requires moderate assistance for  washing, rinsing, or drying the right arm. Patient requires moderate assistance  for washing, rinsing, or drying the left arm. Patient requires moderate  assistance for washing, rinsing, or drying the chest. Patient requires moderate  assistance for washing, rinsing, or drying the abdomen. Patient requires maximal  assistance for washing, rinsing, or drying the perineal area. Patient requires  maximal assistance for washing, rinsing, or drying the buttocks. Patient  requires minimal assistance for washing, rinsing, or drying the right upper leg.  Patient requires minimal assistance for washing, rinsing, or drying the left  upper leg. Patient requires moderate assistance for washing, rinsing, or drying  the right lower leg, including the foot. Patient requires moderate assistance  for washing, rinsing, or drying the left lower leg, including the foot. Patient  performs 0 -  24% of bathing tasks.  Bathing Score = 1, Total Assistance. No  assistive devices were required.  DERREK Upper Body Dressing:  Patient requires total assistance for gathering  clothes. Wearing a bra or undershirt was not applicable for this patient.  Patient requires moderate/considerable physical assistance for threading the  right arm through the garment (shirt/sweater). Patient requires  moderate/considerable physical assistance for threading the left arm through the  garment (shirt/sweater). Patient requires moderate/considerable physical  assistance for pulling an over-head-garment over head or  pulling  front-fastening-garment around back. Patient requires moderate/considerable  physical assistance for pulling an over-head-garment down the trunk or  adjusting/fastening together a front-fastening-garment. Patient performs 60 % of  upper body dressing tasks. Upper Body Dressing Score = 3, Moderate Assistance.  No assistive devices were required.  DERREK Lower Body Dressing:  Patient requires total assistance for gathering  clothes. Wearing underwear or an undergarment is not applicable for this  patient. Patient requires moderate/considerable physical assistance for  threading the right leg through the pants/skirt. Patient requires  moderate/considerable physical assistance for threading the left leg through the  pants/skirt. Patient requires moderate/considerable physical assistance for  pulling pants/skirt over hips and adjusting fasteners. Patient requires  maximal/significant physical assistance for holding clothing and/or donning  and/or doffing right sock. Patient requires maximal/significant physical  assistance for holding clothing and/or donning and/or doffing left sock. Patient  requires maximal/significant physical assistance for holding clothing and/or  donning and/or doffing right shoe. Patient requires maximal/significant physical  assistance for holding clothing and/or donning and/or doffing left shoe. Patient  performs 31.25 % of lower body dressing tasks. Lower Body Dressing Score = 2,  Maximal Assistance. No assistive devices were required.  DERREK Toileting:  Activity was not observed.    DERREK Bladder Management  Level of Assistance:  Bladder Score = 5.  Patient is supervision/set-up for  bladder management, requiring: Setting out equipment. Emptying equipment.  Patient requires the following assistive device(s):  Urinal.  Frequency/Number of Accidents this Shift:  Bladder accidents this shift:  0 .  Patient has not had an accident this shift.    DERREK Bowel Management  Level of Assistance: Bowel  Score = 7.  Patient is completely independent for  bowel management.  Patient did not have bowel movement.  No  medication/intervention was provided.  Frequency/Number of Accidents this Shift: Bowel accidents this shift: 0 .  Patient has not had an accident this shift.    DERREK Bed/Chair/Wheelchair Transfer:  Bed/chair/wheelchair Transfer Score = 3.  Patient performs 50-74% of effort and requires moderate assistance (some  lifting) for transferring to and from the bed/chair/wheelchair. No assistive  devices were required.  DERREK Toilet Transfer:  Activity was not observed.  DERREK Tub/Shower Transfer:  Activity was not observed.    Previously Documented Mode of Locomotion at Discharge:  DERREK Expected Mode of Locomotion at Discharge:  DERREK Walk/Wheelchair:  DERREK Stairs:    DERREK Comprehension:  DERREK Expression:  DERREK Social Interaction:  DERREK Problem Solving:  DERREK Memory:    Therapy Mode Minutes  Occupational Therapy:  Physical Therapy:  Speech Language Pathology:    Discharge Functional Goals:    Signed by: JENI Farfan

## 2018-05-08 NOTE — PLAN OF CARE
Problem: Patient Care Overview  Goal: Plan of Care Review  Outcome: Outcome(s) achieved Date Met: 05/08/18 05/08/18 1530   Patient Care Overview   IRF Plan of Care Review discharged   Progress, Functional Goals preparing for discharge   Coping/Psychosocial   Plan of Care Reviewed With patient;spouse   OTHER   Outcome Summary Pt. discharged from physical rehabilitation to home w/ family assisting. Both verbally expressed understanding of pts. need for assistance w/ ADL's, fxl transfers, and recommendation of 24 hour supervision for safety/assistance. [place] [facility] [place] w/ [place]'s and fxl transfers.  referral made for continued skilled OT services.

## 2018-05-08 NOTE — THERAPY DISCHARGE NOTE
Inpatient Rehabilitation - Physical Therapy Discharge Summary   Oscar       Patient Name: Carlos Cee  : 1934  MRN: [id number]    Today's Date: 2018                 Admit Date: 2018      PT Recommendation and Plan    Visit Dx:    ICD-10-CM ICD-9-CM   1. Debility R53.81 799.3                 PT Charges     Row Name 18 1600             Time Calculation    Start Time 1045  -RF      Stop Time 1105  -RF      Time Calculation (min) 20 min  -RF      PT Received On 18  -RF         Time Calculation- PT    Total Timed Code Minutes- PT 20 minute(s)  -RF        User Key  (r) = Recorded By, (t) = Taken By, (c) = Cosigned By    Initials Name Provider Type    RF Catie Cooper PTA Physical Therapy Assistant                Therapy Charges for Today     Code Description Service Date Service Provider Modifiers Qty    91589607311 HC GAIT TRAINING EA 15 MIN 2018 Catie Cooper, NAYANA GP 2    69261465221 HC PT THERAPEUTIC ACT EA 15 MIN 2018 Catie Cooper PTA GP 1    39301699045 HC PT THER PROC EA 15 MIN 2018 Catie Cooper PTA GP 3    86910835071 HC PT THER SUPP EA 15 MIN 2018 Catie Cooper PTA GP 2    70142704231 HC PT THERAPEUTIC ACT EA 15 MIN 2018 Catie Cooper PTA GP 1          PT Discharge Summary  Reason for Discharge: Discharge from facility  Outcomes Achieved: Patient able to partially acheive established goals  Discharge Destination: Home with home health      Catie Cooper PTA   2018

## 2018-05-08 NOTE — DISCHARGE SUMMARY
Date of Admission: 4/24/2018  Date of Discharge:  5/8/2018    Discharge Diagnosis:   Debility  Pulmonary embolism  Right lower extremity DVT with right lower extremity hematoma status post evacuation now with wound VAC  Hyponatremia  COPD  Hypertension  Chronic diastolic congestive heart failure  Chronic kidney disease stage III  Anemia with heme positive stools    Hospital Course  Patient is a 83 y.o. male presented with diagnoses as listed above.  He required wound vac during hospitalization. He has made improvement with PT and OT and mobility.  WOund looking better overall. Tolerating anticoagulation.  Will require home health @ discharge.      DME: Rolling walker    Home health:  PT 3 x wk x 4wks  for strengthening, endurance, gait training,  transfer training, balance, therapeutic  exercise, bed mobility, home safety, ROM,  steps/stairs, OT 3 x wk x 4 wks for ADL re-  training, home safety, functional mobility,  strengthening, nursing 3 x wk x 4 wks for  wound vac RLE-change dressing on Mondays,  Wednesdays, Fridays, and aide 3 x week x 4  weeks for bathing/personal care.        Pertinent Test Results:   Lab Results   Component Value Date    WBC 6.72 05/05/2018    HGB 7.6 (L) 05/05/2018    HCT 25.1 (L) 05/05/2018    MCV 86.3 05/05/2018     05/05/2018     Lab Results   Component Value Date    GLUCOSE 91 05/05/2018    CALCIUM 7.6 (L) 05/05/2018     (L) 05/05/2018    K 4.2 05/05/2018    CO2 23.4 (L) 05/05/2018    CL 99 05/05/2018    BUN 17 05/05/2018    CREATININE 1.21 05/05/2018    EGFRIFNONA 57 (L) 05/05/2018    BCR 14.0 05/05/2018    ANIONGAP 7.6 05/05/2018     Lab Results   Component Value Date    ALT 27 04/25/2018    AST 39 (H) 04/25/2018       Lab Results   Component Value Date    INR 1.28 (H) 01/03/2018    INR 1.93 (H) 04/25/2017                                                    Physical Exam:     General Appearance:    Alert, cooperative, in no acute distress   Head:    Normocephalic, without  obvious abnormality, atraumatic   Eyes:            Lids and lashes normal, conjunctivae and sclerae normal, no   icterus, no pallor, corneas clear, PERRLA   Ears:    Ears appear intact with no abnormalities noted   Throat:   No oral lesions, no thrush, oral mucosa moist   Neck:   No adenopathy, supple, trachea midline, no thyromegaly, no   carotid bruit, no JVD   Back:     No kyphosis present, no scoliosis present, no skin lesions,      erythema or scars, no tenderness to percussion or                   palpation,   range of motion normal   Lungs:     Clear to auscultation,respirations regular, even and                  unlabored    Heart:    Regular rhythm and normal rate, normal S1 and S2, no            murmur, no gallop, no rub, no click   Chest Wall:    No abnormalities observed   Abdomen:     Normal bowel sounds, no masses, no organomegaly, soft        non-tender, non-distended, no guarding, no rebound                tenderness   Rectal:   Deferred   Extremities:   Moves all extremities, no edema, no cyanosis, no             Redness  RLE with wound vac in place.  Some edema in BLE.    Pulses:   Pulses palpable and equal bilaterally   Skin:   No bleeding, bruising or rash   Lymph nodes:   No palpable adenopathy   Neurologic:   Cranial nerves 2 - 12 grossly intact, sensation intact, DTR       present and equal bilaterally       Discharge Disposition  Home or Self Care    Discharge Medications   Carlos Cee   Home Medication Instructions KACIE:[id number]    Printed on:05/08/18 0836   Medication Information                      albuterol (PROVENTIL HFA;VENTOLIN HFA) 108 (90 Base) MCG/ACT inhaler  Inhale 2 puffs Every 6 (Six) Hours As Needed for Wheezing.             alfuzosin (UROXATRAL) 10 MG 24 hr tablet  Take 10 mg by mouth Daily.             apixaban (ELIQUIS) 5 MG tablet tablet  Take 1 tablet by mouth Every 12 (Twelve) Hours.             budesonide-formoterol (SYMBICORT) 160-4.5 MCG/ACT inhaler  Inhale 2  puffs 2 (Two) Times a Day.             carboxymethylcellulose 1 % ophthalmic solution  Administer 1 drop to both eyes 4 (Four) Times a Day.             carvedilol (COREG) 12.5 MG tablet  Take 12.5 mg by mouth 2 (Two) Times a Day With Meals.             cholecalciferol (VITAMIN D3) 1000 units tablet  Take 1,000 Units by mouth Daily.             fluticasone (FLONASE) 50 MCG/ACT nasal spray  1 spray into each nostril Daily.             furosemide (LASIX) 40 MG tablet  Take 40 mg by mouth Daily.             guaiFENesin (MUCINEX) 600 MG 12 hr tablet  Take 1 tablet by mouth Every 12 (Twelve) Hours.             ketotifen (ZADITOR) 0.025 % ophthalmic solution  Administer 1 drop to both eyes 2 (Two) Times a Day.             levothyroxine (SYNTHROID, LEVOTHROID) 150 MCG tablet  Take 150 mcg by mouth Daily.             NIFEdipine CC (ADALAT CC) 30 MG 24 hr tablet  Take 1 tablet by mouth Daily.             pantoprazole (PROTONIX) 40 MG EC tablet  Take 40 mg by mouth 2 (Two) Times a Day.             potassium chloride (K-DUR,KLOR-CON) 10 MEQ CR tablet  Take 10 mEq by mouth 2 (Two) Times a Day.             probenecid (BENEMID) 500 MG tablet  Take 500 mg by mouth Daily.             vitamin B-12 (CYANOCOBALAMIN) 1000 MCG tablet  Take 1,000 mcg by mouth Daily.                   Discharge Diet:    Diet Orders (active)     Start     Ordered    04/26/18 1800  Dietary Nutrition Supplements Ensure Enlive  Daily With Lunch & Dinner      04/26/18 1300    04/24/18 1925  Diet Regular  Diet Effective Now      04/24/18 1924          Follow-up Appointments  Your Scheduled Appointments     FOLLOW-UP WITH DR SONI ON 5-15-18 @ 11:45 AM  [phone]    FOLLOW-UP WITH WOUND CARE ON 5-10-18 @ 8:45 AM   [address] , [address]                     Additional Instructions for the Follow-ups that You Need to Schedule     Ambulatory Referral to Home Health    As directed      Face to Face Visit Date:  5/8/2018    Follow-up Provider  for Plan of Care?:  I will be treating the patient on an ongoing basis.  Please send me the Plan of Care for signature.    Follow-up Provider:  KREIS, SAMUEL DUANE [[id number]]    Reason/Clinical Findings:  PE, DVT, RLE wound from hematoma    Describe mobility limitations that make leaving home difficult:  Impaired gait, balance, mobility, high fall risk    Nursing/Therapeutic Services Requested:  Physical Therapy Occupational Therapy Skilled Nursing    Skilled nursing orders:  Wound vac application and instruction    Frequency:  1 Week 1                  Samuel Duane Kreis, MD  05/08/18  8:36 AM

## 2018-05-08 NOTE — PROGRESS NOTES
Inpatient Rehabilitation Functional Measures Assessment    Functional Measures  DERREK Eating:  DERREK Grooming:  DERREK Bathing:  DERREK Upper Body Dressing:  DERREK Lower Body Dressing:  DERREK Toileting:    DERREK Bladder Management  Level of Assistance:  Frequency/Number of Accidents this Shift:    DERREK Bowel Management  Level of Assistance:  Frequency/Number of Accidents this Shift:    DERREK Bed/Chair/Wheelchair Transfer:  DERREK Toilet Transfer:  DERREK Tub/Shower Transfer:    Previously Documented Mode of Locomotion at Discharge:  DERREK Expected Mode of Locomotion at Discharge:  DERREK Walk/Wheelchair:  DERREK Stairs:    DERREK Comprehension:  Both ( auditory and visual) modes of comprehension are used  equally. Comprehension Score = 6, Modified [place].  Patient comprehends  complex/abstract information in their primary language, requiring: hearing aids  and glassess not available  DERREK Expression:  Both ( vocal and non-vocal) modes of expression are used  equally. Patient does not express complex/abstract information in their primary  language without a helper. Expression Score = 5, Stand By Prompting. Patient  expresses basic daily needs or ideas without prompting. No assistive devices  were required.  DERREK Social Interaction:  Social Interaction Score = 6, Modified Independent.  Patient is modified independent for social interaction, requiring:  DERREK Problem Solving:  Patient does not make appropriate decisions in order to  solve complex problems without assistance from a helper. Problem Solving Score =  5, Supervision.  Patient makes appropriate decisions in order to solve routine  problems with directing only under stressful or unfamiliar conditions, but no  more than 10% of the time, for the following behavior(s):  DERREK Memory:  Memory Score = 6, Modified [place].  Patient is modified  independent for memory, requiring:    Therapy Mode Minutes  Occupational Therapy:  Physical Therapy:  Speech Language Pathology:    Discharge Functional  Goals:    Signed by: Janice Buckley, Nurse

## 2018-05-08 NOTE — PROGRESS NOTES
Inpatient Rehabilitation Functional Measures Assessment    Functional Measures  DERREK Eating:  DERREK Grooming:  DERREK Bathing:  DERREK Upper Body Dressing:  DERREK Lower Body Dressing:  DERREK Toileting:    DERREK Bladder Management  Level of Assistance:  Frequency/Number of Accidents this Shift:    DERREK Bowel Management  Level of Assistance:  Frequency/Number of Accidents this Shift:    DERREK Bed/Chair/Wheelchair Transfer:  Bed/chair/wheelchair Transfer Score = 4.  Patient performs 75% or more of effort and minimal assistance (little/incidental  help/lifting of one limb/steadying) for transferring to and from the  bed/chair/wheelchair, requiring: Contact guard. Patient requires the following  assistive device(s): Walker. Arm rest.  DERREK Toilet Transfer:  Activity was not observed.  DERREK Tub/Shower Transfer:    Previously Documented Mode of Locomotion at Discharge:  DERREK Expected Mode of Locomotion at Discharge:  DERREK Walk/Wheelchair:  WHEELCHAIR OBSERVATION   Activity was not observed.    WALK OBSERVATION   Activity was not observed.  DERREK Stairs:  Activity was not observed.    DERREK Comprehension:  DERREK Expression:  DERREK Social Interaction:  DERREK Problem Solving:  DERREK Memory:    Therapy Mode Minutes  Occupational Therapy:  Physical Therapy: Individual: 20 minutes.  Speech Language Pathology:    Discharge Functional Goals:    Signed by: Catie Cooper PTA

## 2018-05-08 NOTE — PLAN OF CARE
Problem: Patient Care Overview  Goal: Individualization and Mutuality  Outcome: Ongoing (interventions implemented as appropriate)      Problem: Skin Injury Risk (Adult)  Goal: Skin Health and Integrity  Outcome: Ongoing (interventions implemented as appropriate)      Problem: Functional Mobility Impairment (IRF) (Adult)  Goal: Optimal/Safe Level of [place] with Mobility  Outcome: Ongoing (interventions implemented as appropriate)      Problem: BADL Skill Impairment (IRF) (Adult)  Goal: Optimal Functional [place] for BADLs (Performing or Directing)  Outcome: Ongoing (interventions implemented as appropriate)      Problem: Wound (Includes Pressure Injury) (Adult)  Goal: Signs and Symptoms of Listed Potential Problems Will be Absent, Minimized or Managed (Wound)  Outcome: Ongoing (interventions implemented as appropriate)

## 2018-05-09 NOTE — PROGRESS NOTES
PPS CMG Coordinator  Inpatient Rehabilitation Discharge    Mode of Locomotion: Both walking and wheelchair.    Discharge Against Medical Advice:  No.  Discharge Information  Patient Discharged Alive:  Yes  Discharge Destination/Living Setting: Home with Home Health Services  Diagnosis for Interruption/Death:    Impairment Group: 16 Debility (non-cardiac, non-pulmonary)    Comorbidities:    Complications:      DERREK Bladder Accidents: 9  - Accidents.  Patient used medications/device this  shift.  5/7/2018 7:43:00 AM  Bladder Score = 1.  Five (5) or more  bladder accidents.  DERREK Bowel Accident: 0  - Accidents.  Patient used medications/device this shift.   5/6/2018 8:53:00 AM  Bowel Score = 6. Patient has no accidents, but uses a device/medications.    Signed by: Kanchan Freeman, Supervisor

## 2018-05-09 NOTE — PROGRESS NOTES
Patient Assessment Instrument  Quality Indicators - Discharge    Section GG. Self-Care Performance      Section GG. Mobility Performance      Section J. Health Conditions Discharge  Fall(s) Since Admission:  No    Section M. Skin Conditions Discharge  Unhealed Pressure Ulcer(s) at Stage 1 or Higher:  No    . Current Number of Unhealed Pressure Ulcers      . Worsening in Pressure Ulcer Status Since Admission      . Healed Pressure Ulcer(s)  (Voluntary)    Number of Healed Stage 1: 0  Number of Healed Stage 2: 0  Number of Healed Stage 3: 0  Number of Healed Stage 4: 0    Section O. Special Treatments, Procedures, and Programs Discharge  . Influenza Vaccine - Discharge  Received in this facility for this year's influenza vaccination season:  No.  Influenza Vaccine Not Received Due To: Received outside of this facility.    Date Influenza Vaccine Received (enter date ONLY if received in this unit;  otherwise, skip this entry)      OPTIONAL BRANCH FOR UNPLANNED DISCHARGES  Unplanned Discharge: No    Signed by: Winifred Longoria, Supervisor

## 2018-07-31 ENCOUNTER — HOSPITAL ENCOUNTER (OUTPATIENT)
Facility: HOSPITAL | Age: 83
Setting detail: OBSERVATION
LOS: 1 days | Discharge: HOME | End: 2018-08-01
Attending: FAMILY MEDICINE | Admitting: FAMILY MEDICINE

## 2018-07-31 PROBLEM — D64.9 SYMPTOMATIC ANEMIA: Status: ACTIVE | Noted: 2018-07-31

## 2018-07-31 LAB
ABO GROUP BLD: NORMAL
ABO GROUP BLD: NORMAL
ALBUMIN SERPL-MCNC: 2.3 G/DL (ref 3.4–4.8)
ALBUMIN/GLOB SERPL: 0.6 G/DL (ref 1.5–2.5)
ALP SERPL-CCNC: 78 U/L (ref 40–129)
ALT SERPL W P-5'-P-CCNC: 10 U/L (ref 10–44)
ANION GAP SERPL CALCULATED.3IONS-SCNC: 4.4 MMOL/L (ref 3.6–11.2)
ANISOCYTOSIS BLD QL: NORMAL
AST SERPL-CCNC: 18 U/L (ref 10–34)
BASOPHILS # BLD AUTO: 0.03 10*3/MM3 (ref 0–0.3)
BASOPHILS NFR BLD AUTO: 0.6 % (ref 0–2)
BILIRUB SERPL-MCNC: 0.3 MG/DL (ref 0.2–1.8)
BLD GP AB SCN SERPL QL: NEGATIVE
BUN BLD-MCNC: 10 MG/DL (ref 7–21)
BUN/CREAT SERPL: 11.1 (ref 7–25)
CALCIUM SPEC-SCNC: 7.8 MG/DL (ref 7.7–10)
CHLORIDE SERPL-SCNC: 106 MMOL/L (ref 99–112)
CO2 SERPL-SCNC: 28.6 MMOL/L (ref 24.3–31.9)
CREAT BLD-MCNC: 0.9 MG/DL (ref 0.43–1.29)
DEPRECATED RDW RBC AUTO: 40.9 FL (ref 37–54)
EOSINOPHIL # BLD AUTO: 0.05 10*3/MM3 (ref 0–0.7)
EOSINOPHIL NFR BLD AUTO: 1 % (ref 0–7)
ERYTHROCYTE [DISTWIDTH] IN BLOOD BY AUTOMATED COUNT: 16.1 % (ref 11.5–14.5)
GFR SERPL CREATININE-BSD FRML MDRD: 81 ML/MIN/1.73
GLOBULIN UR ELPH-MCNC: 3.6 GM/DL
GLUCOSE BLD-MCNC: 125 MG/DL (ref 70–110)
HCT VFR BLD AUTO: 21.8 % (ref 42–52)
HGB BLD-MCNC: 6 G/DL (ref 14–18)
HYPOCHROMIA BLD QL: NORMAL
IMM GRANULOCYTES # BLD: 0.01 10*3/MM3 (ref 0–0.03)
IMM GRANULOCYTES NFR BLD: 0.2 % (ref 0–0.5)
LYMPHOCYTES # BLD AUTO: 1.67 10*3/MM3 (ref 1–3)
LYMPHOCYTES NFR BLD AUTO: 31.8 % (ref 16–46)
MCH RBC QN AUTO: 19.8 PG (ref 27–33)
MCHC RBC AUTO-ENTMCNC: 27.5 G/DL (ref 33–37)
MCV RBC AUTO: 71.9 FL (ref 80–94)
MICROCYTES BLD QL: NORMAL
MONOCYTES # BLD AUTO: 0.6 10*3/MM3 (ref 0.1–0.9)
MONOCYTES NFR BLD AUTO: 11.4 % (ref 0–12)
NEUTROPHILS # BLD AUTO: 2.89 10*3/MM3 (ref 1.4–6.5)
NEUTROPHILS NFR BLD AUTO: 55 % (ref 40–75)
OSMOLALITY SERPL CALC.SUM OF ELEC: 278.1 MOSM/KG (ref 273–305)
PLAT MORPH BLD: NORMAL
PLATELET # BLD AUTO: 221 10*3/MM3 (ref 130–400)
PMV BLD AUTO: 9.4 FL (ref 6–10)
POTASSIUM BLD-SCNC: 3.7 MMOL/L (ref 3.5–5.3)
PROT SERPL-MCNC: 5.9 G/DL (ref 6–8)
RBC # BLD AUTO: 3.03 10*6/MM3 (ref 4.7–6.1)
RETICS #: 0.06 10*6/MM3 (ref 0.02–0.13)
RETICS/RBC NFR AUTO: 2.01 % (ref 0.5–2)
RH BLD: NEGATIVE
RH BLD: NEGATIVE
SODIUM BLD-SCNC: 139 MMOL/L (ref 135–153)
T&S EXPIRATION DATE: NORMAL
WBC NRBC COR # BLD: 5.25 10*3/MM3 (ref 4.5–12.5)

## 2018-07-31 PROCEDURE — G0378 HOSPITAL OBSERVATION PER HR: HCPCS

## 2018-07-31 PROCEDURE — 86900 BLOOD TYPING SEROLOGIC ABO: CPT

## 2018-07-31 PROCEDURE — 86901 BLOOD TYPING SEROLOGIC RH(D): CPT

## 2018-07-31 PROCEDURE — 86900 BLOOD TYPING SEROLOGIC ABO: CPT | Performed by: FAMILY MEDICINE

## 2018-07-31 PROCEDURE — 63710000001 DIPHENHYDRAMINE PER 50 MG: Performed by: FAMILY MEDICINE

## 2018-07-31 PROCEDURE — 85007 BL SMEAR W/DIFF WBC COUNT: CPT | Performed by: FAMILY MEDICINE

## 2018-07-31 PROCEDURE — 85060 BLOOD SMEAR INTERPRETATION: CPT | Performed by: FAMILY MEDICINE

## 2018-07-31 PROCEDURE — P9016 RBC LEUKOCYTES REDUCED: HCPCS

## 2018-07-31 PROCEDURE — 80053 COMPREHEN METABOLIC PANEL: CPT | Performed by: FAMILY MEDICINE

## 2018-07-31 PROCEDURE — 94640 AIRWAY INHALATION TREATMENT: CPT

## 2018-07-31 PROCEDURE — 85045 AUTOMATED RETICULOCYTE COUNT: CPT | Performed by: FAMILY MEDICINE

## 2018-07-31 PROCEDURE — 36430 TRANSFUSION BLD/BLD COMPNT: CPT

## 2018-07-31 PROCEDURE — 86901 BLOOD TYPING SEROLOGIC RH(D): CPT | Performed by: FAMILY MEDICINE

## 2018-07-31 PROCEDURE — 86920 COMPATIBILITY TEST SPIN: CPT

## 2018-07-31 PROCEDURE — 86850 RBC ANTIBODY SCREEN: CPT | Performed by: FAMILY MEDICINE

## 2018-07-31 PROCEDURE — 94799 UNLISTED PULMONARY SVC/PX: CPT

## 2018-07-31 PROCEDURE — 85025 COMPLETE CBC W/AUTO DIFF WBC: CPT | Performed by: FAMILY MEDICINE

## 2018-07-31 RX ORDER — LANOLIN ALCOHOL/MO/W.PET/CERES
1000 CREAM (GRAM) TOPICAL DAILY
Status: DISCONTINUED | OUTPATIENT
Start: 2018-08-01 | End: 2018-08-01 | Stop reason: HOSPADM

## 2018-07-31 RX ORDER — CARBOXYMETHYLCELLULOSE SODIUM 5 MG/ML
1 SOLUTION/ DROPS OPHTHALMIC 4 TIMES DAILY
Status: DISCONTINUED | OUTPATIENT
Start: 2018-07-31 | End: 2018-08-01 | Stop reason: HOSPADM

## 2018-07-31 RX ORDER — KETOTIFEN FUMARATE 0.35 MG/ML
1 SOLUTION/ DROPS OPHTHALMIC 2 TIMES DAILY
Status: DISCONTINUED | OUTPATIENT
Start: 2018-07-31 | End: 2018-08-01 | Stop reason: HOSPADM

## 2018-07-31 RX ORDER — PROBENECID 500 MG/1
500 TABLET, FILM COATED ORAL DAILY
Status: DISCONTINUED | OUTPATIENT
Start: 2018-08-01 | End: 2018-08-01 | Stop reason: HOSPADM

## 2018-07-31 RX ORDER — LEVOTHYROXINE SODIUM 0.15 MG/1
150 TABLET ORAL DAILY
Status: DISCONTINUED | OUTPATIENT
Start: 2018-08-01 | End: 2018-08-01 | Stop reason: HOSPADM

## 2018-07-31 RX ORDER — ALBUTEROL SULFATE 2.5 MG/3ML
2.5 SOLUTION RESPIRATORY (INHALATION) EVERY 6 HOURS PRN
Status: DISCONTINUED | OUTPATIENT
Start: 2018-07-31 | End: 2018-08-01 | Stop reason: HOSPADM

## 2018-07-31 RX ORDER — FLUTICASONE PROPIONATE 50 MCG
1 SPRAY, SUSPENSION (ML) NASAL DAILY
Status: DISCONTINUED | OUTPATIENT
Start: 2018-08-01 | End: 2018-08-01 | Stop reason: HOSPADM

## 2018-07-31 RX ORDER — CARVEDILOL 12.5 MG/1
6.25 TABLET ORAL 2 TIMES DAILY WITH MEALS
COMMUNITY

## 2018-07-31 RX ORDER — BUDESONIDE AND FORMOTEROL FUMARATE DIHYDRATE 160; 4.5 UG/1; UG/1
2 AEROSOL RESPIRATORY (INHALATION)
Status: DISCONTINUED | OUTPATIENT
Start: 2018-07-31 | End: 2018-08-01 | Stop reason: HOSPADM

## 2018-07-31 RX ORDER — FUROSEMIDE 40 MG/1
40 TABLET ORAL DAILY
Status: DISCONTINUED | OUTPATIENT
Start: 2018-08-01 | End: 2018-08-01 | Stop reason: HOSPADM

## 2018-07-31 RX ORDER — TAMSULOSIN HYDROCHLORIDE 0.4 MG/1
0.4 CAPSULE ORAL NIGHTLY
Status: DISCONTINUED | OUTPATIENT
Start: 2018-08-01 | End: 2018-08-01 | Stop reason: HOSPADM

## 2018-07-31 RX ORDER — CARVEDILOL 6.25 MG/1
6.25 TABLET ORAL 2 TIMES DAILY WITH MEALS
Status: DISCONTINUED | OUTPATIENT
Start: 2018-07-31 | End: 2018-08-01 | Stop reason: HOSPADM

## 2018-07-31 RX ORDER — SODIUM CHLORIDE 9 MG/ML
INJECTION, SOLUTION INTRAVENOUS
Status: COMPLETED
Start: 2018-07-31 | End: 2018-08-01

## 2018-07-31 RX ORDER — ACETAMINOPHEN 325 MG/1
650 TABLET ORAL EVERY 4 HOURS PRN
Status: DISCONTINUED | OUTPATIENT
Start: 2018-07-31 | End: 2018-08-01 | Stop reason: HOSPADM

## 2018-07-31 RX ORDER — PANTOPRAZOLE SODIUM 40 MG/1
40 TABLET, DELAYED RELEASE ORAL
Status: DISCONTINUED | OUTPATIENT
Start: 2018-07-31 | End: 2018-08-01 | Stop reason: HOSPADM

## 2018-07-31 RX ORDER — POTASSIUM CHLORIDE 750 MG/1
10 TABLET, FILM COATED, EXTENDED RELEASE ORAL 2 TIMES DAILY WITH MEALS
Status: DISCONTINUED | OUTPATIENT
Start: 2018-07-31 | End: 2018-08-01 | Stop reason: HOSPADM

## 2018-07-31 RX ORDER — SODIUM CHLORIDE 0.9 % (FLUSH) 0.9 %
1-10 SYRINGE (ML) INJECTION AS NEEDED
Status: DISCONTINUED | OUTPATIENT
Start: 2018-07-31 | End: 2018-08-01 | Stop reason: HOSPADM

## 2018-07-31 RX ORDER — DIPHENHYDRAMINE HCL 25 MG
25 CAPSULE ORAL ONCE
Status: COMPLETED | OUTPATIENT
Start: 2018-07-31 | End: 2018-07-31

## 2018-07-31 RX ORDER — ACETAMINOPHEN 325 MG/1
650 TABLET ORAL ONCE
Status: COMPLETED | OUTPATIENT
Start: 2018-07-31 | End: 2018-07-31

## 2018-07-31 RX ADMIN — PANTOPRAZOLE SODIUM 40 MG: 40 TABLET, DELAYED RELEASE ORAL at 21:00

## 2018-07-31 RX ADMIN — CARBOXYMETHYLCELLULOSE SODIUM 1 DROP: 5 SOLUTION/ DROPS OPHTHALMIC at 21:02

## 2018-07-31 RX ADMIN — APIXABAN 5 MG: 5 TABLET, FILM COATED ORAL at 21:00

## 2018-07-31 RX ADMIN — ACETAMINOPHEN 650 MG: 325 TABLET, FILM COATED ORAL at 20:58

## 2018-07-31 RX ADMIN — BUDESONIDE AND FORMOTEROL FUMARATE DIHYDRATE 2 PUFF: 160; 4.5 AEROSOL RESPIRATORY (INHALATION) at 18:53

## 2018-07-31 RX ADMIN — POTASSIUM CHLORIDE 10 MEQ: 750 TABLET, FILM COATED, EXTENDED RELEASE ORAL at 21:00

## 2018-07-31 RX ADMIN — DIPHENHYDRAMINE HYDROCHLORIDE 25 MG: 25 CAPSULE ORAL at 20:58

## 2018-07-31 RX ADMIN — KETOTIFEN FUMARATE 1 DROP: 0.35 SOLUTION/ DROPS OPHTHALMIC at 21:02

## 2018-08-01 ENCOUNTER — APPOINTMENT (OUTPATIENT)
Dept: CT IMAGING | Facility: HOSPITAL | Age: 83
End: 2018-08-01

## 2018-08-01 VITALS
WEIGHT: 170.9 LBS | RESPIRATION RATE: 18 BRPM | HEART RATE: 68 BPM | HEIGHT: 70 IN | TEMPERATURE: 97.9 F | DIASTOLIC BLOOD PRESSURE: 77 MMHG | BODY MASS INDEX: 24.47 KG/M2 | OXYGEN SATURATION: 98 % | SYSTOLIC BLOOD PRESSURE: 143 MMHG

## 2018-08-01 LAB
ABO + RH BLD: NORMAL
ABO + RH BLD: NORMAL
ANION GAP SERPL CALCULATED.3IONS-SCNC: 4.5 MMOL/L (ref 3.6–11.2)
ANISOCYTOSIS BLD QL: NORMAL
BASOPHILS # BLD AUTO: 0.02 10*3/MM3 (ref 0–0.3)
BASOPHILS NFR BLD AUTO: 0.4 % (ref 0–2)
BH BB BLOOD EXPIRATION DATE: NORMAL
BH BB BLOOD EXPIRATION DATE: NORMAL
BH BB BLOOD TYPE BARCODE: 9500
BH BB BLOOD TYPE BARCODE: 9500
BH BB DISPENSE STATUS: NORMAL
BH BB DISPENSE STATUS: NORMAL
BH BB PRODUCT CODE: NORMAL
BH BB PRODUCT CODE: NORMAL
BH BB UNIT NUMBER: NORMAL
BH BB UNIT NUMBER: NORMAL
BUN BLD-MCNC: 11 MG/DL (ref 7–21)
BUN/CREAT SERPL: 11.6 (ref 7–25)
CALCIUM SPEC-SCNC: 7.9 MG/DL (ref 7.7–10)
CHLORIDE SERPL-SCNC: 107 MMOL/L (ref 99–112)
CO2 SERPL-SCNC: 28.5 MMOL/L (ref 24.3–31.9)
CREAT BLD-MCNC: 0.95 MG/DL (ref 0.43–1.29)
CROSSMATCH INTERPRETATION: NORMAL
CROSSMATCH INTERPRETATION: NORMAL
DEPRECATED RDW RBC AUTO: 45.9 FL (ref 37–54)
EOSINOPHIL # BLD AUTO: 0.08 10*3/MM3 (ref 0–0.7)
EOSINOPHIL NFR BLD AUTO: 1.6 % (ref 0–7)
ERYTHROCYTE [DISTWIDTH] IN BLOOD BY AUTOMATED COUNT: 17.6 % (ref 11.5–14.5)
FOLATE SERPL-MCNC: 10.93 NG/ML (ref 5.4–20)
GFR SERPL CREATININE-BSD FRML MDRD: 76 ML/MIN/1.73
GLUCOSE BLD-MCNC: 80 MG/DL (ref 70–110)
HCT VFR BLD AUTO: 28.4 % (ref 42–52)
HGB BLD-MCNC: 8.3 G/DL (ref 14–18)
HYPOCHROMIA BLD QL: NORMAL
IMM GRANULOCYTES # BLD: 0.01 10*3/MM3 (ref 0–0.03)
IMM GRANULOCYTES NFR BLD: 0.2 % (ref 0–0.5)
IRON 24H UR-MRATE: 175 MCG/DL (ref 53–167)
IRON SATN MFR SERPL: 77 % (ref 20–50)
LYMPHOCYTES # BLD AUTO: 1.98 10*3/MM3 (ref 1–3)
LYMPHOCYTES NFR BLD AUTO: 39.4 % (ref 16–46)
MCH RBC QN AUTO: 21.7 PG (ref 27–33)
MCHC RBC AUTO-ENTMCNC: 29.2 G/DL (ref 33–37)
MCV RBC AUTO: 74.2 FL (ref 80–94)
MICROCYTES BLD QL: NORMAL
MONOCYTES # BLD AUTO: 0.57 10*3/MM3 (ref 0.1–0.9)
MONOCYTES NFR BLD AUTO: 11.3 % (ref 0–12)
NEUTROPHILS # BLD AUTO: 2.37 10*3/MM3 (ref 1.4–6.5)
NEUTROPHILS NFR BLD AUTO: 47.1 % (ref 40–75)
OSMOLALITY SERPL CALC.SUM OF ELEC: 277.8 MOSM/KG (ref 273–305)
PLAT MORPH BLD: NORMAL
PLATELET # BLD AUTO: 221 10*3/MM3 (ref 130–400)
PMV BLD AUTO: 9.8 FL (ref 6–10)
POTASSIUM BLD-SCNC: 3.7 MMOL/L (ref 3.5–5.3)
RBC # BLD AUTO: 3.83 10*6/MM3 (ref 4.7–6.1)
SODIUM BLD-SCNC: 140 MMOL/L (ref 135–153)
TIBC SERPL-MCNC: 228 MCG/DL (ref 241–421)
UNIT  ABO: NORMAL
UNIT  ABO: NORMAL
UNIT  RH: NORMAL
UNIT  RH: NORMAL
VIT B12 BLD-MCNC: 1133 PG/ML (ref 211–911)
WBC NRBC COR # BLD: 5.03 10*3/MM3 (ref 4.5–12.5)

## 2018-08-01 PROCEDURE — G0378 HOSPITAL OBSERVATION PER HR: HCPCS

## 2018-08-01 PROCEDURE — 74176 CT ABD & PELVIS W/O CONTRAST: CPT | Performed by: RADIOLOGY

## 2018-08-01 PROCEDURE — 94799 UNLISTED PULMONARY SVC/PX: CPT

## 2018-08-01 PROCEDURE — 36430 TRANSFUSION BLD/BLD COMPNT: CPT

## 2018-08-01 PROCEDURE — 85007 BL SMEAR W/DIFF WBC COUNT: CPT | Performed by: FAMILY MEDICINE

## 2018-08-01 PROCEDURE — 85025 COMPLETE CBC W/AUTO DIFF WBC: CPT | Performed by: FAMILY MEDICINE

## 2018-08-01 PROCEDURE — 86900 BLOOD TYPING SEROLOGIC ABO: CPT

## 2018-08-01 PROCEDURE — 80048 BASIC METABOLIC PNL TOTAL CA: CPT | Performed by: FAMILY MEDICINE

## 2018-08-01 PROCEDURE — 82607 VITAMIN B-12: CPT | Performed by: FAMILY MEDICINE

## 2018-08-01 PROCEDURE — 82746 ASSAY OF FOLIC ACID SERUM: CPT | Performed by: FAMILY MEDICINE

## 2018-08-01 PROCEDURE — 83550 IRON BINDING TEST: CPT | Performed by: FAMILY MEDICINE

## 2018-08-01 PROCEDURE — 83540 ASSAY OF IRON: CPT | Performed by: FAMILY MEDICINE

## 2018-08-01 PROCEDURE — P9016 RBC LEUKOCYTES REDUCED: HCPCS

## 2018-08-01 PROCEDURE — 74176 CT ABD & PELVIS W/O CONTRAST: CPT

## 2018-08-01 RX ORDER — SODIUM CHLORIDE 9 MG/ML
INJECTION, SOLUTION INTRAVENOUS
Status: COMPLETED
Start: 2018-08-01 | End: 2018-08-01

## 2018-08-01 RX ADMIN — FLUTICASONE PROPIONATE 1 SPRAY: 50 SPRAY, METERED NASAL at 08:48

## 2018-08-01 RX ADMIN — SODIUM CHLORIDE 250 ML: 9 INJECTION, SOLUTION INTRAVENOUS at 01:51

## 2018-08-01 RX ADMIN — CARVEDILOL 6.25 MG: 6.25 TABLET, FILM COATED ORAL at 08:47

## 2018-08-01 RX ADMIN — LEVOTHYROXINE SODIUM 150 MCG: 150 TABLET ORAL at 08:47

## 2018-08-01 RX ADMIN — PANTOPRAZOLE SODIUM 40 MG: 40 TABLET, DELAYED RELEASE ORAL at 05:33

## 2018-08-01 RX ADMIN — POTASSIUM CHLORIDE 10 MEQ: 750 TABLET, FILM COATED, EXTENDED RELEASE ORAL at 08:47

## 2018-08-01 RX ADMIN — FUROSEMIDE 40 MG: 40 TABLET ORAL at 08:47

## 2018-08-01 RX ADMIN — KETOTIFEN FUMARATE 1 DROP: 0.35 SOLUTION/ DROPS OPHTHALMIC at 08:48

## 2018-08-01 RX ADMIN — CYANOCOBALAMIN TAB 1000 MCG 1000 MCG: 1000 TAB at 08:47

## 2018-08-01 RX ADMIN — CARBOXYMETHYLCELLULOSE SODIUM 1 DROP: 5 SOLUTION/ DROPS OPHTHALMIC at 08:47

## 2018-08-01 RX ADMIN — BUDESONIDE AND FORMOTEROL FUMARATE DIHYDRATE 2 PUFF: 160; 4.5 AEROSOL RESPIRATORY (INHALATION) at 06:31

## 2018-08-01 NOTE — DISCHARGE SUMMARY
Date of Admission: 7/31/2018  Date of Discharge:  8/1/2018    Discharge Diagnosis:   Symptomatic anemia  H/o PE in March  H/o DVT in March  Paroxysmal atrial fibrillation  CKD-3  COPD  Hypertension  Chronic diastolic CHF    Hospital Course  Patient is a 83 y.o. male presented with symptomatically anemia with hemoglobin is 6.  He received 2 units of packed red blood cells.  Hemoglobin is 8.3 feels a lot better.  Stools negative for occult blood ×1.  CT abdomen and pelvis pending.  He is feeling well and would like to go home today.  We're going to hold Eliquis for 1 week and follow up in the office in one week.  He is 4 months out from PE and DVT.  He does have paroxysmal atrial fibrillation.  If hemoglobin is stable in 1 week we could try to resume Eliquis and monitor his blood counts    Pertinent Test Results:   Lab Results   Component Value Date    WBC 5.03 08/01/2018    HGB 8.3 (L) 08/01/2018    HCT 28.4 (L) 08/01/2018    MCV 74.2 (L) 08/01/2018     08/01/2018     Lab Results   Component Value Date    GLUCOSE 80 08/01/2018    CALCIUM 7.9 08/01/2018     08/01/2018    K 3.7 08/01/2018    CO2 28.5 08/01/2018     08/01/2018    BUN 11 08/01/2018    CREATININE 0.95 08/01/2018    EGFRIFNONA 76 08/01/2018    BCR 11.6 08/01/2018    ANIONGAP 4.5 08/01/2018     Lab Results   Component Value Date    ALT 10 07/31/2018    AST 18 07/31/2018       Lab Results   Component Value Date    INR 1.28 (H) 01/03/2018    INR 1.93 (H) 04/25/2017                                                    Physical Exam:     General Appearance:    Alert, cooperative, in no acute distress   Head:    Normocephalic, without obvious abnormality, atraumatic   Eyes:            Lids and lashes normal, conjunctivae and sclerae normal, no   icterus, no pallor, corneas clear, PERRLA   Ears:    Ears appear intact with no abnormalities noted   Throat:   No oral lesions, no thrush, oral mucosa moist   Neck:   No adenopathy, supple, trachea  midline, no thyromegaly, no   carotid bruit, no JVD   Back:     No kyphosis present, no scoliosis present, no skin lesions,      erythema or scars, no tenderness to percussion or                   palpation,   range of motion normal   Lungs:     Clear to auscultation,respirations regular, even and                  unlabored    Heart:    Regular rhythm and normal rate, normal S1 and S2, no            murmur, no gallop, no rub, no click   Chest Wall:    No abnormalities observed   Abdomen:     Normal bowel sounds, no masses, no organomegaly, soft        non-tender, non-distended, no guarding, no rebound                tenderness   Rectal:   Deferred   Extremities:   Moves all extremities well, no edema, no cyanosis, no             redness   Pulses:   Pulses palpable and equal bilaterally   Skin:   No bleeding, bruising or rash.  Right leg with healing surgical site    Lymph nodes:   No palpable adenopathy   Neurologic:   Cranial nerves 2 - 12 grossly intact, sensation intact, DTR       present and equal bilaterally       Discharge Disposition  Home or Self Care    Discharge Medications     Discharge Medications      Continue These Medications      Instructions Start Date   albuterol 108 (90 Base) MCG/ACT inhaler  Commonly known as:  PROVENTIL HFA;VENTOLIN HFA   2 puffs, Inhalation, Every 6 Hours PRN      alfuzosin 10 MG 24 hr tablet  Commonly known as:  UROXATRAL   10 mg, Oral, Daily      budesonide-formoterol 160-4.5 MCG/ACT inhaler  Commonly known as:  SYMBICORT   2 puffs, Inhalation, 2 Times Daily - RT      carboxymethylcellulose 1 % ophthalmic solution   1 drop, Both Eyes, 4 Times Daily      carvedilol 12.5 MG tablet  Commonly known as:  COREG   6.25 mg, Oral, 2 Times Daily With Meals      fluticasone 50 MCG/ACT nasal spray  Commonly known as:  FLONASE   1 spray, Nasal, Daily      ketotifen 0.025 % ophthalmic solution  Commonly known as:  ZADITOR   1 drop, Both Eyes, 2 Times Daily      LASIX 40 MG tablet  Generic  drug:  furosemide   40 mg, Oral, Daily      levothyroxine 150 MCG tablet  Commonly known as:  SYNTHROID, LEVOTHROID   150 mcg, Oral, Daily      pantoprazole 40 MG EC tablet  Commonly known as:  PROTONIX   40 mg, Oral, 2 Times Daily      potassium chloride 10 MEQ CR tablet  Commonly known as:  K-DUR,KLOR-CON   10 mEq, Oral, 2 Times Daily - RT      probenecid 500 MG tablet  Commonly known as:  BENEMID   500 mg, Oral, Daily      vitamin B-12 1000 MCG tablet  Commonly known as:  CYANOCOBALAMIN   1,000 mcg, Oral, Daily         Stop These Medications    apixaban 5 MG tablet tablet  Commonly known as:  ELIQUIS              Discharge Diet:    Diet Orders (active)     Start     Ordered    07/31/18 1646  Diet Regular  Diet Effective Now      07/31/18 1648          Follow-up Appointments    Additional Instructions for the Follow-ups that You Need to Schedule     Discharge Follow-up with PCP    As directed      Currently Documented PCP:  Samuel Duane Kreis, MD  PCP Phone Number:  [phone]    Follow Up Details:  1 week               Test Results Pending at Discharge   Order Current Status    CBC & Differential In process    Peripheral Blood Smear In process    Scan Slide In process           Samuel Duane Kreis, MD  08/01/18  8:10 AM

## 2018-08-01 NOTE — PROGRESS NOTES
"     LOS: 1 day     Chief Complaint:  Symptomatic Anemia    Subjective     Interval History:   No major changes x 24 hours. He states he is feeling much better. Less SOA.  CT scan of the abdomen and pelvis is pending currently.  He is received 2 units pack red blood cells and repeat blood work is pending this morning as well.  No chest pain, abdominal pain, nausea or vomiting.  No Melena.      Objective     Vital Signs  /77 (BP Location: Right arm)   Pulse 68   Temp 97.9 °F (36.6 °C)   Resp 18   Ht 177.8 cm (70\")   Wt 77.5 kg (170 lb 14.4 oz)   SpO2 98%   BMI 24.52 kg/m²   Intake & Output (last day)       07/31 0701 - 08/01 0700    P.O. 240    Blood 700    Total Intake(mL/kg) 940 (12.1)    Urine (mL/kg/hr) 300    Total Output 300    Net +640         Unmeasured Stool Occurrence 0 x            Physical Exam:     General Appearance:    Alert, cooperative, in no acute distress   Head:    Normocephalic, without obvious abnormality, atraumatic   Eyes:            Lids and lashes normal, conjunctivae and sclerae normal, no   icterus, no pallor, corneas clear, PERRLA   Ears:    Ears appear intact with no abnormalities noted   Throat:   No oral lesions, no thrush, oral mucosa moist   Neck:   No adenopathy, supple, trachea midline, no thyromegaly, no   carotid bruit, no JVD   Lungs:     Clear to auscultation,respirations regular, even and                  unlabored    Heart:    regular rhythm and normal rate, normal S1 and S2, no            murmur, no gallop, no rub, no click   Chest Wall:    No abnormalities observed   Abdomen:     Normal bowel sounds, no masses, no organomegaly, soft        non-tender, non-distended, no guarding, no rebound                tenderness   Extremities:   Moves all extremities well, no edema, no cyanosis, no             redness   Pulses:   Pulses palpable and equal bilaterally   Skin:   No bleeding, bruising or rash   Lymph nodes:   No palpable adenopathy   Neurologic:   Cranial " nerves 2 - 12 grossly intact, sensation intact, DTR       present and equal bilaterally        Results Review:    Lab Results   Component Value Date    WBC 5.25 07/31/2018    HGB 6.0 (C) 07/31/2018    HCT 21.8 (C) 07/31/2018    MCV 71.9 (L) 07/31/2018     07/31/2018       Lab Results   Component Value Date    GLUCOSE 125 (H) 07/31/2018    BUN 10 07/31/2018    CREATININE 0.90 07/31/2018    EGFRIFNONA 81 07/31/2018    BCR 11.1 07/31/2018     07/31/2018    K 3.7 07/31/2018     07/31/2018    CO2 28.6 07/31/2018    CALCIUM 7.8 07/31/2018    ALBUMIN 2.30 (L) 07/31/2018    AST 18 07/31/2018    ALT 10 07/31/2018     Lab Results   Component Value Date    INR 1.28 (H) 01/03/2018    INR 1.93 (H) 04/25/2017       No results found for: POCGLU       Medication Review:     Current Facility-Administered Medications:   •  acetaminophen (TYLENOL) tablet 650 mg, 650 mg, Oral, Q4H PRN, Samuel Duane Kreis, MD  •  albuterol (PROVENTIL) nebulizer solution 0.083% 2.5 mg/3mL, 2.5 mg, Nebulization, Q6H PRN, Samuel Duane Kreis, MD  •  apixaban (ELIQUIS) tablet 5 mg, 5 mg, Oral, Q12H, Samuel Duane Kreis, MD, 5 mg at 07/31/18 2100  •  budesonide-formoterol (SYMBICORT) 160-4.5 MCG/ACT inhaler 2 puff, 2 puff, Inhalation, BID - RT, Samuel Duane Kreis, MD, 2 puff at 08/01/18 0631  •  carboxymethylcellulose (REFRESH PLUS) 0.5 % ophthalmic solution 1 drop, 1 drop, Both Eyes, 4x Daily, Samuel Duane Kreis, MD, 1 drop at 07/31/18 2102  •  carvedilol (COREG) tablet 6.25 mg, 6.25 mg, Oral, BID With Meals, Samuel Duane Kreis, MD  •  fluticasone (FLONASE) 50 MCG/ACT nasal spray 1 spray, 1 spray, Nasal, Daily, Samuel Duane Kreis, MD  •  furosemide (LASIX) tablet 40 mg, 40 mg, Oral, Daily, Samuel Duane Kreis, MD  •  ketotifen (ZADITOR) 0.025 % ophthalmic solution 1 drop, 1 drop, Both Eyes, BID, Samuel Duane Kreis, MD, 1 drop at 07/31/18 2102  •  levothyroxine (SYNTHROID, LEVOTHROID) tablet 150 mcg, 150 mcg, Oral, Daily, Samuel Duane Kreis,  MD  •  pantoprazole (PROTONIX) EC tablet 40 mg, 40 mg, Oral, BID AC, Samuel Duane Kreis, MD, 40 mg at 08/01/18 0533  •  potassium chloride (K-DUR,KLOR-CON) ER tablet 10 mEq, 10 mEq, Oral, BID With Meals, Samuel Duane Kreis, MD, 10 mEq at 07/31/18 2100  •  probenecid (BENEMID) tablet 500 mg, 500 mg, Oral, Daily, Samuel Duane Kreis, MD  •  sodium chloride 0.9 % flush 1-10 mL, 1-10 mL, Intravenous, PRN, Samuel Duane Kreis, MD  •  tamsulosin (FLOMAX) 24 hr capsule 0.4 mg, 0.4 mg, Oral, Nightly, Samuel Duane Kreis, MD  •  vitamin B-12 (CYANOCOBALAMIN) tablet 1,000 mcg, 1,000 mcg, Oral, Daily, Samuel Duane Kreis, MD      Assessment/Plan   Symptomatic anemia  H/o PE in March  H/o DVT in March  Paroxysmal atrial fibrillation  CKD-3  COPD  Hypertension  Chronic diastolic CHF    Continue with medial monitoring    Eliquis 5 mg BID for now secondary to recent DVT/PE    Lasix 40 mg daily    Protonix 40 mg BID for GI prophylaxis/hx GI bleed    Await CT scan results/BW this AM    Symbicort + Scheduled inhalants for COPD    CBC, BMP daily    TRACY Dickinson  08/01/18  6:36 AM     He feels much better today.  CT the abdomen and pelvis is pending but I looked at the images and did not see any evidence of retroperitoneal bleed.  Hemoglobin is 8.3.  No gross bleeding.  At this point we will discharge him home.  Unfortunately the iron studies were not done until after he received his blood transfusions.  Therefore, they are not valid.  Peripheral smear was collected yesterday.  I'm going to hold Eliquis one week and follow-up in the office and if hemoglobin is stable we can try to resume at that time.      Samuel Duane Kreis, MD  08/01/18  8:09 AM

## 2018-08-01 NOTE — PROGRESS NOTES
Discharge Planning Assessment   Oscar     Patient Name: Carlos Cee  MRN: [id number]  Today's Date: 8/1/2018    Admit Date: 7/31/2018      Discharge Plan     Row Name 08/01/18 0925       Plan    Final Discharge Disposition Code 06 - home with home health care    Final Note Pt is being discharged home on this date. Pt utilizes [facility]. SS spoke with [facility] on this date to inform of discharge. SS faxed pt's information on this date. Nursing to call report. No further needs at this time.            Home Medical Care     Service Request Status Selected Specialties Address Phone Number Fax Number    [facility] Accepted N/A [address] [id number] [id number] [phone]      Social Care     No service coordination in this encounter.        Expected Discharge Date and Time     Expected Discharge Date Expected Discharge Time    Aug 1, 2018           Mago Padilla

## 2018-08-01 NOTE — DISCHARGE PLACEMENT REQUEST
"Claudine Cee  (83 y.o. Male)     Date of Birth Social Security Number Address Home Phone MRN    [id number]  [id number] [address] [phone] [id number]    Christianity Marital Status          None        Admission Date Admission Type Admitting Provider Attending Provider Department, Room/Bed    7/31/18 Elective Kreis, Samuel Duane, MD Kreis, Samuel Duane, MD [address], [address]/    Discharge Date Discharge Disposition Discharge Destination         Home or Self Care              Attending Provider:  Samuel Duane Kreis, MD    Allergies:  No Known Allergies    Isolation:  None   Infection:  None   Code Status:  CPR    Ht:  177.8 cm (70\")   Wt:  77.5 kg (170 lb 14.4 oz)    Admission Cmt:  None   Principal Problem:  None                Active Insurance as of 7/31/2018     Primary Coverage     Payor Plan Insurance Group Employer/Plan Group    MEDICARE MEDICARE A & B      Payor Plan Address Payor Plan Phone Number Effective From Effective To    [address] [phone] 10/1/1999     [place] [address]       Subscriber Name Subscriber Birth Date Member ID       CLAUDINE CEE 1934 [id number]           Secondary Coverage     Payor Plan Insurance Group Employer/Plan Group     FOR LIFE  FOR LIFE MC SUPP      Payor Plan Address Payor Plan Phone Number Effective From Effective To    [address] [phone] 4/25/2017     [facility] [id number]       Subscriber Name Subscriber Birth Date Member ID       CLAUDINE CEE 1934 [id number]                 Emergency Contacts      (Rel.) Home Phone Work Phone Mobile Phone    Anjelica Taveras (Other) [phone] -- --            Emergency Contact Information     Name Relation Home Work Anjelica Khan Other [phone]            Insurance Information                MEDICARE/MEDICARE A & B Phone: [phone]    Subscriber: Claudine Cee Subscriber#: [id number]    Group#:  Precert#:          FOR " LIFE/ FOR LIFE  SUPP Phone: [phone]    Subscriber: Carlos Cee Subscriber#: [id number]    Group#:  Precert#:              History & Physical      Samuel Duane Kreis, MD at 7/31/2018  4:49 PM                Chief complaint Anemia    Subjective     Patient is a 83 y.o. male presents with SOA with [facility].5.  He has h/o anemia and is on Eliquis. Having no black or bloody stools.  Has a RLE surgical incision that is healing after debridement 3 months ago.  Had a wound vac on it but not now.  It's almost healed and is not bleeding.  Having SOA with exertion.  Tires easily. Getting worse over the past few weeks.  Hgb was 8 previously.  He feels weak. No chest pain / chest pressure.      Review of Systems   On review of systems the patient denies the following unless noted above:     Constitutional:  Fevers, chills, weight change, fatigue     Eyes: Vision changes, headache, double vision, loss of vision     ENT: Runny nose, nose bleeds, ringing in ears, pain with swallowing, sore throat     Cardiovascular: Chest pains, palpitations, PND, orthopnea     Respiratory: Cough, wheezing, SOA, hemoptysis     GI:  Abdominal pain, diarrhea, constipation, change in stool caliber,    Rectal bleeding, vomiting or nausea     : Difficulty voiding, dysuria, hematuria     Musculoskeletal: Changes of any chronic joint pain, swelling     Skin: Rash, itching, easy bruisability     Neurological: Unilateral weakness, new onset numbness, speech difficulties     Psychiatric: Sadness, tearfulness, feelings of hopelessness, racing thoughts     Endocrine:  Heat or cold intolerance, mood swings, polydipsia, polyphagia,    recent hypoglycemia      History  Past Medical History:   Diagnosis Date   • A-fib (CMS/HCC)    • Anemia    • Bladder cancer (CMS/HCC)    • Bleeding ulcer    • CHF (congestive heart failure) (CMS/HCC)    • CKD (chronic kidney disease), stage III    • COPD (chronic obstructive  pulmonary disease) (CMS/[facility])    • Diastolic heart failure (CMS/[facility])    • Disease of thyroid gland    • DVT (deep venous thrombosis) (CMS/[facility])    • GI bleed    • Hypertension    • PE (pulmonary thromboembolism) (CMS/[facility])      Past Surgical History:   Procedure Laterality Date   • CARDIAC DEFIBRILLATOR PLACEMENT     • CATARACT EXTRACTION     • DEBRIDEMENT LEG Right    • PACEMAKER REPLACEMENT     • PROSTATE SURGERY     • VENA CAVA FILTER INSERTION       No family history on file. Both parents .    Social History   Substance Use Topics   • Smoking status: Never Smoker   • Smokeless tobacco: Never Used   • Alcohol use No     Prescriptions Prior to Admission   Medication Sig Dispense Refill Last Dose   • albuterol (PROVENTIL HFA;VENTOLIN HFA) 108 (90 Base) MCG/ACT inhaler Inhale 2 puffs Every 6 (Six) Hours As Needed for Wheezing.   Unknown at Unknown time   • alfuzosin (UROXATRAL) 10 MG 24 hr tablet Take 10 mg by mouth Daily.   Unknown at Unknown time   • apixaban (ELIQUIS) 5 MG tablet tablet Take 1 tablet by mouth Every 12 (Twelve) Hours. 60 tablet 0    • budesonide-formoterol (SYMBICORT) 160-4.5 MCG/ACT inhaler Inhale 2 puffs 2 (Two) Times a Day.   Unknown at Unknown time   • carboxymethylcellulose 1 % ophthalmic solution Administer 1 drop to both eyes 4 (Four) Times a Day.   Unknown at Unknown time   • cholecalciferol (VITAMIN D3) 1000 units tablet Take 1,000 Units by mouth Daily.   Unknown at Unknown time   • fluticasone (FLONASE) 50 MCG/ACT nasal spray 1 spray into each nostril Daily.   Unknown at Unknown time   • furosemide (LASIX) 40 MG tablet Take 40 mg by mouth Daily.   Unknown at Unknown time   • guaiFENesin (MUCINEX) 600 MG 12 hr tablet Take 1 tablet by mouth Every 12 (Twelve) Hours. 60 tablet 0    • ketotifen (ZADITOR) 0.025 % ophthalmic solution Administer 1 drop to both eyes 2 (Two) Times a Day.   Unknown at Unknown time   • levothyroxine (SYNTHROID, LEVOTHROID) 150 MCG tablet Take 150 mcg by mouth  Daily.   Unknown at Unknown time   • NIFEdipine CC (ADALAT CC) 30 MG 24 hr tablet Take 1 tablet by mouth Daily. 30 tablet 0    • pantoprazole (PROTONIX) 40 MG EC tablet Take 40 mg by mouth 2 (Two) Times a Day.   Unknown at Unknown time   • potassium chloride (K-DUR,KLOR-CON) 10 MEQ CR tablet Take 10 mEq by mouth 2 (Two) Times a Day.   Unknown at Unknown time   • probenecid (BENEMID) 500 MG tablet Take 500 mg by mouth Daily.   Unknown at Unknown time   • vitamin B-12 (CYANOCOBALAMIN) 1000 MCG tablet Take 1,000 mcg by mouth Daily.   Unknown at Unknown time     Allergies:  Patient has no known allergies.    Scheduled Meds:    acetaminophen 650 mg Oral Once   diphenhydrAMINE 25 mg Oral Once     Continuous Infusions:   PRN Meds:.•  acetaminophen  •  sodium chloride          Objective     Vital Signs    There were no vitals taken for this visit.        Physical Exam:   General Appearance: alert, pleasant, appears stated age, interactive and   Cooperative. Fatigues easily   Head: normocephalic, without obvious abnormality and atraumatic   Eyes: lids and lashes normal, conjunctivae and sclerae normal, no icterus, no   pallor, corneas clear and PERRLA   Ears: ears appear intact with no abnormalities noted   Nose: nares normal, septum midline, mucosa normal and no drainage   Throat: no oral lesions, no thrush, oral mucosa moist and oopharynx normal   Neck: no adenopathy, supple, trachea midline, no thyromegaly, no carotid bruit   and no JVD   Back: no kyphosis present, no scoliosis present, no skin lesions, erythema, or   scars, no tenderness to percussion or palpation and range of motion normal   Lungs: clear to auscultation, respirations regular, respirations even and    respirations unlabored. No accessory muscle use.    Heart:: regular rhythm & normal rate, normal S1, S2, no murmur, no gallop, no   rub and no click.  Chest wall with no abnormalities observed. PMI nondisplaced   Abdomen: normal bowel sounds in all  quadrants, no masses, no hepatomegaly,   no splenomegaly, soft non-tender, no guarding and no rebound tenderness   Rectal exam with brown stool, soft, in rectal vault. Hemoccult negative stool   Extremities: no edema, no cyanosis, no redness, no tenderness, no clubbing   Healing hematoma RLE   Musculoskeletal: joints with full range of motion and joints, no effusion.  Pedal   pulses palpable and equal bilaterally   Skin: no bleeding, bruising or rash and no lesions noted   Lymph Nodes: no palpable adenopathy   Neurologic: Mental Status orientated to person, place, time and situation.    Speech is intelligible, Nonlabored.  Alertness alert and awake and mood/affect   normal, Cranial Nerves 2 - 12 grossly intact as examined   Sensory intact in BLE and BUE.   Motor strength  LUE is 5/5 proximally, 5/5 distally      RUE is 5/5 proximally, 5/5 distally      LLE is 5/5 @ hip flexors, quads as well as       dorsiflexion / plantar flexion      RLE is 5/5 @ hip flexors, quads as well as        dosriflexion / plantar flexion   Reflexes: Right:  2+ biceps, 2+ brachioradialis      2+ patella, 2+ achilles     Left: 2+ biceps, 2+ brachioradialis      2+ patella, 2+ achilles    Results Review:   Lab Results (last 24 hours)     ** No results found for the last 24 hours. **        Imaging Results (last 24 hours)     ** No results found for the last 24 hours. **          Assessment/Plan     Symptomatic anemia  H/o PE in March  H/o DVT in March  Paroxysmal atrial fibrillation  CKD-3  COPD  Hypertension  Chronic diastolic CHF      B12, Iron studies, Folic acid level, Retic count, peripheral smear    Type Cross and transfuse 2 units PRBC    Stool negative for occult blood at the office    Discussed code status: Full code          Samuel Duane Kreis, MD  07/31/18  4:50 PM        Electronically signed by Samuel Duane Kreis, MD at 7/31/2018  5:12 PM       Hospital Medications (active)       Dose Frequency Start End    acetaminophen  (TYLENOL) tablet 650 mg 650 mg Every 4 Hours PRN 7/31/2018     Sig - Route: Take 2 tablets by mouth Every 4 (Four) Hours As Needed for Mild Pain . - Oral    acetaminophen (TYLENOL) tablet 650 mg 650 mg Once 7/31/2018 7/31/2018    Sig - Route: Take 2 tablets by mouth 1 (One) Time. - Oral    albuterol (PROVENTIL) nebulizer solution 0.083% 2.5 mg/3mL 2.5 mg Every 6 Hours PRN 7/31/2018     Sig - Route: Take 2.5 mg by nebulization Every 6 (Six) Hours As Needed for Wheezing or Shortness of Air. - Nebulization    apixaban (ELIQUIS) tablet 5 mg 5 mg Every 12 Hours Scheduled 7/31/2018     Sig - Route: Take 1 tablet by mouth Every 12 (Twelve) Hours. - Oral    budesonide-formoterol (SYMBICORT) 160-4.5 MCG/ACT inhaler 2 puff 2 puff 2 Times Daily - RT 7/31/2018     Sig - Route: Inhale 2 puffs 2 (Two) Times a Day. - Inhalation    carboxymethylcellulose (REFRESH PLUS) 0.5 % ophthalmic solution 1 drop 1 drop 4 Times Daily 7/31/2018     Sig - Route: Administer 1 drop to both eyes 4 (Four) Times a Day. - Both Eyes    carvedilol (COREG) tablet 6.25 mg 6.25 mg 2 Times Daily With Meals 7/31/2018     Sig - Route: Take 1 tablet by mouth 2 (Two) Times a Day With Meals. - Oral    diphenhydrAMINE (BENADRYL) capsule 25 mg 25 mg Once 7/31/2018 7/31/2018    Sig - Route: Take 1 capsule by mouth 1 (One) Time. - Oral    fluticasone (FLONASE) 50 MCG/ACT nasal spray 1 spray 1 spray Daily 8/1/2018     Sig - Route: 1 spray into the nostril(s) as directed by provider Daily. - Nasal    furosemide (LASIX) tablet 40 mg 40 mg Daily 8/1/2018     Sig - Route: Take 1 tablet by mouth Daily. - Oral    ketotifen (ZADITOR) 0.025 % ophthalmic solution 1 drop 1 drop 2 Times Daily 7/31/2018     Sig - Route: Administer 1 drop to both eyes 2 (Two) Times a Day. - Both Eyes    levothyroxine (SYNTHROID, LEVOTHROID) tablet 150 mcg 150 mcg Daily 8/1/2018     Sig - Route: Take 1 tablet by mouth Daily. - Oral    pantoprazole (PROTONIX) EC tablet 40 mg 40 mg 2 Times Daily  Before Meals 7/31/2018     Sig - Route: Take 1 tablet by mouth 2 (Two) Times a Day Before Meals. - Oral    potassium chloride (K-DUR,KLOR-CON) ER tablet 10 mEq 10 mEq 2 Times Daily With Meals 7/31/2018     Sig - Route: Take 1 tablet by mouth 2 (Two) Times a Day With Meals. - Oral    probenecid (BENEMID) tablet 500 mg 500 mg Daily 8/1/2018     Sig - Route: Take 1 tablet by mouth Daily. - Oral    sodium chloride 0.9 % flush 1-10 mL 1-10 mL As Needed 7/31/2018     Sig - Route: Infuse 1-10 mL into a venous catheter As Needed for Line Care. - Intravenous    sodium chloride 0.9 % infusion  - ADS Override Pull   7/31/2018 8/1/2018    Notes to Pharmacy: Created by cabinet override    sodium chloride 0.9 % infusion  - ADS Override Pull   8/1/2018 8/1/2018    Notes to Pharmacy: Created by cabinet override    tamsulosin (FLOMAX) 24 hr capsule 0.4 mg 0.4 mg Nightly 8/1/2018     Sig - Route: Take 1 capsule by mouth Every Night. - Oral    vitamin B-12 (CYANOCOBALAMIN) tablet 1,000 mcg 1,000 mcg Daily 8/1/2018     Sig - Route: Take 1 tablet by mouth Daily. - Oral            Lab Results (last 24 hours)     Procedure Component Value Units Date/Time    CBC & Differential [[id number]] Collected:  08/01/18 0700    Specimen:  Blood Updated:  08/01/18 0827    Narrative:       The following orders were created for panel order CBC & Differential.  Procedure                               Abnormality         Status                     ---------                               -----------         ------                     Scan Slide[[id number]]                                       Final result               CBC Auto Differential[[id number]]        Abnormal            Final result                 Please view results for these tests on the individual orders.    Scan Slide [[id number]] Collected:  08/01/18 0700    Specimen:  Blood Updated:  08/01/18 0827     Anisocytosis Slight/1+     Hypochromia Slight/1+     Microcytes Slight/1+     Platelet  Morphology Normal    Folate [[id number]]  (Normal) Collected:  08/01/18 0700    Specimen:  Blood Updated:  08/01/18 0809     Folate 10.93 ng/mL     Vitamin B12 [[id number]]  (Abnormal) Collected:  08/01/18 0700    Specimen:  Blood Updated:  08/01/18 0809     Vitamin B-12 1,133 (H) pg/mL     Basic Metabolic Panel [[id number]]  (Normal) Collected:  08/01/18 0700    Specimen:  Blood Updated:  08/01/18 0801     Glucose 80 mg/dL      BUN 11 mg/dL      Creatinine 0.95 mg/dL      Sodium 140 mmol/L      Potassium 3.7 mmol/L      Chloride 107 mmol/L      CO2 28.5 mmol/L      Calcium 7.9 mg/dL      eGFR Non African Amer 76 mL/min/1.73      BUN/Creatinine Ratio 11.6     Anion Gap 4.5 mmol/L     Narrative:       The MDRD GFR formula is only valid for adults with stable renal function between ages 18 and 70.    Osmolality, Calculated [[id number]]  (Normal) Collected:  08/01/18 0700    Specimen:  Blood Updated:  08/01/18 0801     Osmolality Calc 277.8 mOsm/kg     Iron Profile [[id number]]  (Abnormal) Collected:  08/01/18 0700    Specimen:  Blood Updated:  08/01/18 0800     Iron 175 (H) mcg/dL      TIBC 228 (L) mcg/dL      Iron Saturation 77 (H) %     CBC Auto Differential [[id number]]  (Abnormal) Collected:  08/01/18 0700    Specimen:  Blood Updated:  08/01/18 0739     WBC 5.03 10*3/mm3      RBC 3.83 (L) 10*6/mm3      Hemoglobin 8.3 (L) g/dL      Hematocrit 28.4 (L) %      MCV 74.2 (L) fL      MCH 21.7 (L) pg      MCHC 29.2 (L) g/dL      RDW 17.6 (H) %      RDW-SD 45.9 fl      MPV 9.8 fL      Platelets 221 10*3/mm3      Neutrophil % 47.1 %      Lymphocyte % 39.4 %      Monocyte % 11.3 %      Eosinophil % 1.6 %      Basophil % 0.4 %      Immature Grans % 0.2 %      Neutrophils, Absolute 2.37 10*3/mm3      Lymphocytes, Absolute 1.98 10*3/mm3      Monocytes, Absolute 0.57 10*3/mm3      Eosinophils, Absolute 0.08 10*3/mm3      Basophils, Absolute 0.02 10*3/mm3      Immature Grans, Absolute 0.01 10*3/mm3     Comprehensive Metabolic Panel  [[id number]]  (Abnormal) Collected:  07/31/18 1713    Specimen:  Blood Updated:  07/31/18 1800     Glucose 125 (H) mg/dL      BUN 10 mg/dL      Creatinine 0.90 mg/dL      Sodium 139 mmol/L      Potassium 3.7 mmol/L      Chloride 106 mmol/L      CO2 28.6 mmol/L      Calcium 7.8 mg/dL      Total Protein 5.9 (L) g/dL      Albumin 2.30 (L) g/dL      ALT (SGPT) 10 U/L      AST (SGOT) 18 U/L      Alkaline Phosphatase 78 U/L      Comment: Note New Reference Ranges        Total Bilirubin 0.3 mg/dL      eGFR Non African Amer 81 mL/min/1.73      Globulin 3.6 gm/dL      A/G Ratio 0.6 (L) g/dL      BUN/Creatinine Ratio 11.1     Anion Gap 4.4 mmol/L     Narrative:       The MDRD GFR formula is only valid for adults with stable renal function between ages 18 and 70.    Osmolality, Calculated [[id number]]  (Normal) Collected:  07/31/18 1713    Specimen:  Blood Updated:  07/31/18 1800     Osmolality Calc 278.1 mOsm/kg     CBC & Differential [[id number]] Collected:  07/31/18 1713    Specimen:  Blood Updated:  07/31/18 1745    Narrative:       The following orders were created for panel order CBC & Differential.  Procedure                               Abnormality         Status                     ---------                               -----------         ------                     Scan Slide[[id number]]                                       Final result               CBC Auto Differential[[id number]]        Abnormal            Final result                 Please view results for these tests on the individual orders.    Scan Slide [[id number]] Collected:  07/31/18 1713    Specimen:  Blood Updated:  07/31/18 1745     Anisocytosis Slight/1+     Hypochromia Mod/2+     Microcytes Mod/2+     Platelet Morphology Normal    CBC Auto Differential [[id number]]  (Abnormal) Collected:  07/31/18 1713    Specimen:  Blood Updated:  07/31/18 1744     WBC 5.25 10*3/mm3      RBC 3.03 (L) 10*6/mm3      Hemoglobin 6.0 (C) g/dL      Hematocrit 21.8 (C) %       MCV 71.9 (L) fL      MCH 19.8 (L) pg      MCHC 27.5 (L) g/dL      RDW 16.1 (H) %      RDW-SD 40.9 fl      MPV 9.4 fL      Platelets 221 10*3/mm3      Neutrophil % 55.0 %      Lymphocyte % 31.8 %      Monocyte % 11.4 %      Eosinophil % 1.0 %      Basophil % 0.6 %      Immature Grans % 0.2 %      Neutrophils, Absolute 2.89 10*3/mm3      Lymphocytes, Absolute 1.67 10*3/mm3      Monocytes, Absolute 0.60 10*3/mm3      Eosinophils, Absolute 0.05 10*3/mm3      Basophils, Absolute 0.03 10*3/mm3      Immature Grans, Absolute 0.01 10*3/mm3     Reticulocytes [[id number]]  (Abnormal) Collected:  07/31/18 1713    Specimen:  Blood Updated:  07/31/18 1733     Reticulocyte % 2.01 (H) %      Reticulocyte Absolute 0.0609 10*6/mm3     Peripheral Blood Smear [[id number]] Collected:  07/31/18 1713    Specimen:  Blood Updated:  07/31/18 1728        Imaging Results (last 24 hours)     Procedure Component Value Units Date/Time    CT Abdomen Pelvis Without Contrast [[id number]] Collected:  08/01/18 0806     Updated:  08/01/18 0806    Narrative:       CT ABDOMEN PELVIS WITHOUT CONTRAST-     CLINICAL INDICATION: Anemia, rule out retroperitoneal hematoma.          COMPARISON: None available.     TECHNIQUE: Axial images were acquired from the lung bases through the  pubic symphysis without any IV or oral contrast.  Reformatted images were created in both the coronal and sagittal planes.     Radiation dose reduction techniques were utilized per ALARA protocol.  Automated exposure control was initiated through either or [employer] or  [employer] software packages by  protocol.           FINDINGS:   Lung bases show bibasilar effusions and bibasilar atelectasis.     The liver is homogeneous. There is no evidence of focal hepatic mass     The spleen is homogeneous     There is no peripancreatic stranding or pancreatic head mass.     There is no adrenal enlargement.     There are bilateral renal cysts. High attenuation foci are seen on  "both  kidneys also which may represent hyperdense cysts.     No retroperitoneal hematoma is present.     The urinary bladder wall is slightly thickened.      Otherwise I do not see any free fluid or walled off fluid collections.     There is no bowel wall thickening or mesenteric stranding.     There is no evidence of mesenteric or retroperitoneal adenopathy.     Large volume stool in the colon.     Evidence of atherosclerotic vascular disease.  Inferior vena cava filter is present.       Impression:       No retroperitoneal hematoma.     Bibasilar effusions, right greater than left.                          ECG/EMG Results (last 24 hours)     ** No results found for the last 24 hours. **           Physician Progress Notes (last 24 hours) (Notes from 7/31/2018  8:27 AM through 8/1/2018  8:27 AM)      Samuel Duane Kreis, MD at 8/1/2018  6:36 AM               LOS: 1 day     Chief Complaint:  Symptomatic Anemia    Subjective     Interval History:   No major changes x 24 hours. He states he is feeling much better. Less SOA.  CT scan of the abdomen and pelvis is pending currently.  He is received 2 units pack red blood cells and repeat blood work is pending this morning as well.  No chest pain, abdominal pain, nausea or vomiting.  No Melena.      Objective     Vital Signs  /77 (BP Location: Right arm)   Pulse 68   Temp 97.9 °F (36.6 °C)   Resp 18   Ht 177.8 cm (70\")   Wt 77.5 kg (170 lb 14.4 oz)   SpO2 98%   BMI 24.52 kg/m²    Intake & Output (last day)       07/31 0701 - 08/01 0700    P.O. 240    Blood 700    Total Intake(mL/kg) 940 (12.1)    Urine (mL/kg/hr) 300    Total Output 300    Net +640         Unmeasured Stool Occurrence 0 x            Physical Exam:     General Appearance:    Alert, cooperative, in no acute distress   Head:    Normocephalic, without obvious abnormality, atraumatic   Eyes:            Lids and lashes normal, conjunctivae and sclerae normal, no   icterus, no pallor, corneas clear, " PERRLA   Ears:    Ears appear intact with no abnormalities noted   Throat:   No oral lesions, no thrush, oral mucosa moist   Neck:   No adenopathy, supple, trachea midline, no thyromegaly, no   carotid bruit, no JVD   Lungs:     Clear to auscultation,respirations regular, even and                  unlabored    Heart:    regular rhythm and normal rate, normal S1 and S2, no            murmur, no gallop, no rub, no click   Chest Wall:    No abnormalities observed   Abdomen:     Normal bowel sounds, no masses, no organomegaly, soft        non-tender, non-distended, no guarding, no rebound                tenderness   Extremities:   Moves all extremities well, no edema, no cyanosis, no             redness   Pulses:   Pulses palpable and equal bilaterally   Skin:   No bleeding, bruising or rash   Lymph nodes:   No palpable adenopathy   Neurologic:   Cranial nerves 2 - 12 grossly intact, sensation intact, DTR       present and equal bilaterally        Results Review:    Lab Results   Component Value Date    WBC 5.25 07/31/2018    HGB 6.0 (C) 07/31/2018    HCT 21.8 (C) 07/31/2018    MCV 71.9 (L) 07/31/2018     07/31/2018       Lab Results   Component Value Date    GLUCOSE 125 (H) 07/31/2018    BUN 10 07/31/2018    CREATININE 0.90 07/31/2018    EGFRIFNONA 81 07/31/2018    BCR 11.1 07/31/2018     07/31/2018    K 3.7 07/31/2018     07/31/2018    CO2 28.6 07/31/2018    CALCIUM 7.8 07/31/2018    ALBUMIN 2.30 (L) 07/31/2018    AST 18 07/31/2018    ALT 10 07/31/2018     Lab Results   Component Value Date    INR 1.28 (H) 01/03/2018    INR 1.93 (H) 04/25/2017       No results found for: POCGLU       Medication Review:     Current Facility-Administered Medications:   •  acetaminophen (TYLENOL) tablet 650 mg, 650 mg, Oral, Q4H PRN, Samuel Duane Kreis, MD  •  albuterol (PROVENTIL) nebulizer solution 0.083% 2.5 mg/3mL, 2.5 mg, Nebulization, Q6H PRN, Samuel Duane Kreis, MD  •  apixaban (ELIQUIS) tablet 5 mg, 5 mg, Oral,  Q12H, Samuel Duane Kreis, MD, 5 mg at 07/31/18 2100  •  budesonide-formoterol (SYMBICORT) 160-4.5 MCG/ACT inhaler 2 puff, 2 puff, Inhalation, BID - RT, Samuel Duane Kreis, MD, 2 puff at 08/01/18 0631  •  carboxymethylcellulose (REFRESH PLUS) 0.5 % ophthalmic solution 1 drop, 1 drop, Both Eyes, 4x Daily, Samuel Duane Kreis, MD, 1 drop at 07/31/18 2102  •  carvedilol (COREG) tablet 6.25 mg, 6.25 mg, Oral, BID With Meals, Samuel Duane Kreis, MD  •  fluticasone (FLONASE) 50 MCG/ACT nasal spray 1 spray, 1 spray, Nasal, Daily, Samuel Duane Kreis, MD  •  furosemide (LASIX) tablet 40 mg, 40 mg, Oral, Daily, Samuel Duane Kreis, MD  •  ketotifen (ZADITOR) 0.025 % ophthalmic solution 1 drop, 1 drop, Both Eyes, BID, Samuel Duane Kreis, MD, 1 drop at 07/31/18 2102  •  levothyroxine (SYNTHROID, LEVOTHROID) tablet 150 mcg, 150 mcg, Oral, Daily, Samuel Duane Kreis, MD  •  pantoprazole (PROTONIX) EC tablet 40 mg, 40 mg, Oral, BID AC, Samuel Duane Kreis, MD, 40 mg at 08/01/18 0533  •  potassium chloride (K-DUR,KLOR-CON) ER tablet 10 mEq, 10 mEq, Oral, BID With Meals, Samuel Duane Kreis, MD, 10 mEq at 07/31/18 2100  •  probenecid (BENEMID) tablet 500 mg, 500 mg, Oral, Daily, Samuel Duane Kreis, MD  •  sodium chloride 0.9 % flush 1-10 mL, 1-10 mL, Intravenous, PRN, Samuel Duane Kreis, MD  •  tamsulosin (FLOMAX) 24 hr capsule 0.4 mg, 0.4 mg, Oral, Nightly, Samuel Duane Kreis, MD  •  vitamin B-12 (CYANOCOBALAMIN) tablet 1,000 mcg, 1,000 mcg, Oral, Daily, Samuel Duane Kreis, MD      Assessment/Plan   Symptomatic anemia  H/o PE in March  H/o DVT in March  Paroxysmal atrial fibrillation  CKD-3  COPD  Hypertension  Chronic diastolic CHF    Continue with medial monitoring    Eliquis 5 mg BID for now secondary to recent DVT/PE    Lasix 40 mg daily    Protonix 40 mg BID for GI prophylaxis/hx GI bleed    Await CT scan results/BW this AM    Symbicort + Scheduled inhalants for COPD    CBC, BMP daily    TRACY Dickinson  08/01/18  6:36 AM     He  feels much better today.  CT the abdomen and pelvis is pending but I looked at the images and did not see any evidence of retroperitoneal bleed.  Hemoglobin is 8.3.  No gross bleeding.  At this point we will discharge him home.  Unfortunately the iron studies were not done until after he received his blood transfusions.  Therefore, they are not valid.  Peripheral smear was collected yesterday.  I'm going to hold Eliquis one week and follow-up in the office and if hemoglobin is stable we can try to resume at that time.      Samuel Duane Kreis, MD  08/01/18  8:09 AM          Electronically signed by Samuel Duane Kreis, MD at 8/1/2018  8:09 AM       Medical Student Notes (last 24 hours) (Notes from 7/31/2018  8:27 AM through 8/1/2018  8:27 AM)     No notes of this type exist for this encounter.        Consult Notes (last 24 hours) (Notes from 7/31/2018  8:27 AM through 8/1/2018  8:27 AM)     No notes of this type exist for this encounter.        Nutrition Notes (last 24 hours) (Notes from 7/31/2018  8:27 AM through 8/1/2018  8:27 AM)     No notes of this type exist for this encounter.        Physical Therapy Notes (last 24 hours) (Notes from 7/31/2018  8:27 AM through 8/1/2018  8:27 AM)     No notes of this type exist for this encounter.        Occupational Therapy Notes (last 24 hours) (Notes from 7/31/2018  8:27 AM through 8/1/2018  8:27 AM)     No notes of this type exist for this encounter.        Speech Language Pathology Notes (last 24 hours) (Notes from 7/31/2018  8:27 AM through 8/1/2018  8:27 AM)     No notes of this type exist for this encounter.        Respiratory Therapy Notes (last 24 hours) (Notes from 7/31/2018  8:27 AM through 8/1/2018  8:27 AM)     No notes of this type exist for this encounter.             Discharge Summary      Samuel Duane Kreis, MD at 8/1/2018  8:10 AM          Date of Admission: 7/31/2018  Date of Discharge:  8/1/2018    Discharge Diagnosis:   Symptomatic anemia  H/o PE in  March  H/o DVT in March  Paroxysmal atrial fibrillation  CKD-3  COPD  Hypertension  Chronic diastolic CHF    Hospital Course  Patient is a 83 y.o. male presented with symptomatically anemia with hemoglobin is 6.  He received 2 units of packed red blood cells.  Hemoglobin is 8.3 feels a lot better.  Stools negative for occult blood ×1.  CT abdomen and pelvis pending.  He is feeling well and would like to go home today.  We're going to hold Eliquis for 1 week and follow up in the office in one week.  He is 4 months out from PE and DVT.  He does have paroxysmal atrial fibrillation.  If hemoglobin is stable in 1 week we could try to resume Eliquis and monitor his blood counts    Pertinent Test Results:   Lab Results   Component Value Date    WBC 5.03 08/01/2018    HGB 8.3 (L) 08/01/2018    HCT 28.4 (L) 08/01/2018    MCV 74.2 (L) 08/01/2018     08/01/2018     Lab Results   Component Value Date    GLUCOSE 80 08/01/2018    CALCIUM 7.9 08/01/2018     08/01/2018    K 3.7 08/01/2018    CO2 28.5 08/01/2018     08/01/2018    BUN 11 08/01/2018    CREATININE 0.95 08/01/2018    EGFRIFNONA 76 08/01/2018    BCR 11.6 08/01/2018    ANIONGAP 4.5 08/01/2018     Lab Results   Component Value Date    ALT 10 07/31/2018    AST 18 07/31/2018       Lab Results   Component Value Date    INR 1.28 (H) 01/03/2018    INR 1.93 (H) 04/25/2017                                                    Physical Exam:     General Appearance:    Alert, cooperative, in no acute distress   Head:    Normocephalic, without obvious abnormality, atraumatic   Eyes:            Lids and lashes normal, conjunctivae and sclerae normal, no   icterus, no pallor, corneas clear, PERRLA   Ears:    Ears appear intact with no abnormalities noted   Throat:   No oral lesions, no thrush, oral mucosa moist   Neck:   No adenopathy, supple, trachea midline, no thyromegaly, no   carotid bruit, no JVD   Back:     No kyphosis present, no scoliosis present, no skin  lesions,      erythema or scars, no tenderness to percussion or                   palpation,   range of motion normal   Lungs:     Clear to auscultation,respirations regular, even and                  unlabored    Heart:    Regular rhythm and normal rate, normal S1 and S2, no            murmur, no gallop, no rub, no click   Chest Wall:    No abnormalities observed   Abdomen:     Normal bowel sounds, no masses, no organomegaly, soft        non-tender, non-distended, no guarding, no rebound                tenderness   Rectal:   Deferred   Extremities:   Moves all extremities well, no edema, no cyanosis, no             redness   Pulses:   Pulses palpable and equal bilaterally   Skin:   No bleeding, bruising or rash.  Right leg with healing surgical site    Lymph nodes:   No palpable adenopathy   Neurologic:   Cranial nerves 2 - 12 grossly intact, sensation intact, DTR       present and equal bilaterally       Discharge Disposition  Home or Self Care    Discharge Medications     Discharge Medications      Continue These Medications      Instructions Start Date   albuterol 108 (90 Base) MCG/ACT inhaler  Commonly known as:  PROVENTIL HFA;VENTOLIN HFA   2 puffs, Inhalation, Every 6 Hours PRN      alfuzosin 10 MG 24 hr tablet  Commonly known as:  UROXATRAL   10 mg, Oral, Daily      budesonide-formoterol 160-4.5 MCG/ACT inhaler  Commonly known as:  SYMBICORT   2 puffs, Inhalation, 2 Times Daily - RT      carboxymethylcellulose 1 % ophthalmic solution   1 drop, Both Eyes, 4 Times Daily      carvedilol 12.5 MG tablet  Commonly known as:  COREG   6.25 mg, Oral, 2 Times Daily With Meals      fluticasone 50 MCG/ACT nasal spray  Commonly known as:  FLONASE   1 spray, Nasal, Daily      ketotifen 0.025 % ophthalmic solution  Commonly known as:  ZADITOR   1 drop, Both Eyes, 2 Times Daily      LASIX 40 MG tablet  Generic drug:  furosemide   40 mg, Oral, Daily      levothyroxine 150 MCG tablet  Commonly known as:  SYNTHROID,  LEVOTHROID   150 mcg, Oral, Daily      pantoprazole 40 MG EC tablet  Commonly known as:  PROTONIX   40 mg, Oral, 2 Times Daily      potassium chloride 10 MEQ CR tablet  Commonly known as:  K-DUR,KLOR-CON   10 mEq, Oral, 2 Times Daily - RT      probenecid 500 MG tablet  Commonly known as:  BENEMID   500 mg, Oral, Daily      vitamin B-12 1000 MCG tablet  Commonly known as:  CYANOCOBALAMIN   1,000 mcg, Oral, Daily         Stop These Medications    apixaban 5 MG tablet tablet  Commonly known as:  ELIQUIS              Discharge Diet:    Diet Orders (active)     Start     Ordered    07/31/18 1646  Diet Regular  Diet Effective Now      07/31/18 1648          Follow-up Appointments    Additional Instructions for the Follow-ups that You Need to Schedule     Discharge Follow-up with PCP    As directed      Currently Documented PCP:  Samuel Duane Kreis, MD  PCP Phone Number:  [phone]    Follow Up Details:  1 week               Test Results Pending at Discharge   Order Current Status    CBC & Differential In process    Peripheral Blood Smear In process    Scan Slide In process           Samuel Duane Kreis, MD  08/01/18  8:10 AM                Electronically signed by Samuel Duane Kreis, MD at 8/1/2018  8:12 AM       Discharge Order     Start     Ordered    08/01/18 0808  Discharge patient  Once     Expected Discharge Date:  08/01/18    Discharge Disposition:  Home or Self Care    Physician of Record for Attribution - Please select from Treatment Team:  KREIS, SAMUEL DUANE [[id number]]    Review needed by CMO to determine Physician of Record:  No       Question Answer Comment   Physician of Record for Attribution - Please select from Treatment Team KREIS, SAMUEL DUANE    Review needed by CMO to determine Physician of Record No        08/01/18 0808

## 2018-08-01 NOTE — PLAN OF CARE
Problem: Fall Risk (Adult)  Goal: Identify Related Risk Factors and Signs and Symptoms  Outcome: Ongoing (interventions implemented as appropriate)   07/31/18 3965   Fall Risk (Adult)   Related Risk Factors (Fall Risk) environment unfamiliar;sleep pattern alteration;impaired vision;fear of falling;gait/mobility problems   Signs and Symptoms (Fall Risk) presence of risk factors     Goal: Absence of Fall  Outcome: Ongoing (interventions implemented as appropriate)      Problem: Skin Injury Risk (Adult)  Goal: Identify Related Risk Factors and Signs and Symptoms  Outcome: Ongoing (interventions implemented as appropriate)    Goal: Skin Health and Integrity  Outcome: Ongoing (interventions implemented as appropriate)      Problem: Anemia (Adult)  Goal: Identify Related Risk Factors and Signs and Symptoms  Outcome: Ongoing (interventions implemented as appropriate)    Goal: Symptom Improvement  Outcome: Ongoing (interventions implemented as appropriate)

## 2018-08-01 NOTE — PHARMACY PATIENT ASSISTANCE
Pharmacy evaluated patient cost for home medication, Eliquis. The patient requested that I speak with Anjelica Thomason about his medications. [facility] is [facility] and Anjelica Thomason reported that the [facility] was [facility] the [facility] at [facility] cost. No other needs have been noted at this time.    Thank you,  Connie Cabrera. Orlin [facility]  08/01/18  10:05 AM

## 2018-08-03 LAB
CYTOLOGIST CVX/VAG CYTO: NORMAL
PATH INTERP BLD-IMP: NORMAL

## 2018-08-14 ENCOUNTER — HOSPITAL ENCOUNTER (EMERGENCY)
Facility: HOSPITAL | Age: 83
Discharge: HOME | End: 2018-08-14
Attending: EMERGENCY MEDICINE | Admitting: EMERGENCY MEDICINE

## 2018-08-14 VITALS
RESPIRATION RATE: 18 BRPM | OXYGEN SATURATION: 97 % | HEART RATE: 72 BPM | DIASTOLIC BLOOD PRESSURE: 86 MMHG | SYSTOLIC BLOOD PRESSURE: 132 MMHG | HEIGHT: 70 IN | TEMPERATURE: 98.1 F | BODY MASS INDEX: 26.48 KG/M2 | WEIGHT: 185 LBS

## 2018-08-14 DIAGNOSIS — D64.9 ANEMIA, UNSPECIFIED TYPE: Primary | ICD-10-CM

## 2018-08-14 LAB
ABO GROUP BLD: NORMAL
ALBUMIN SERPL-MCNC: 2.1 G/DL (ref 3.4–4.8)
ALBUMIN/GLOB SERPL: 0.5 G/DL (ref 1.5–2.5)
ALP SERPL-CCNC: 91 U/L (ref 40–129)
ALT SERPL W P-5'-P-CCNC: 3 U/L (ref 10–44)
ANION GAP SERPL CALCULATED.3IONS-SCNC: 4 MMOL/L (ref 3.6–11.2)
AST SERPL-CCNC: 10 U/L (ref 10–34)
BASOPHILS # BLD AUTO: 0.02 10*3/MM3 (ref 0–0.3)
BASOPHILS NFR BLD AUTO: 0.3 % (ref 0–2)
BILIRUB SERPL-MCNC: 0.5 MG/DL (ref 0.2–1.8)
BLD GP AB SCN SERPL QL: NEGATIVE
BUN BLD-MCNC: 8 MG/DL (ref 7–21)
BUN/CREAT SERPL: 9.6 (ref 7–25)
CALCIUM SPEC-SCNC: 8 MG/DL (ref 7.7–10)
CHLORIDE SERPL-SCNC: 105 MMOL/L (ref 99–112)
CO2 SERPL-SCNC: 28 MMOL/L (ref 24.3–31.9)
CREAT BLD-MCNC: 0.83 MG/DL (ref 0.43–1.29)
DEPRECATED RDW RBC AUTO: 55.1 FL (ref 37–54)
DEVELOPER EXPIRATION DATE: NORMAL
DEVELOPER LOT NUMBER: NORMAL
EOSINOPHIL # BLD AUTO: 0.1 10*3/MM3 (ref 0–0.7)
EOSINOPHIL NFR BLD AUTO: 1.7 % (ref 0–7)
ERYTHROCYTE [DISTWIDTH] IN BLOOD BY AUTOMATED COUNT: 21 % (ref 11.5–14.5)
EXPIRATION DATE: NORMAL
FECAL OCCULT BLOOD SCREEN, POC: NEGATIVE
GFR SERPL CREATININE-BSD FRML MDRD: 88 ML/MIN/1.73
GLOBULIN UR ELPH-MCNC: 3.9 GM/DL
GLUCOSE BLD-MCNC: 88 MG/DL (ref 70–110)
HCT VFR BLD AUTO: 27.9 % (ref 42–52)
HGB BLD-MCNC: 8.3 G/DL (ref 14–18)
IMM GRANULOCYTES # BLD: 0 10*3/MM3 (ref 0–0.03)
IMM GRANULOCYTES NFR BLD: 0 % (ref 0–0.5)
LYMPHOCYTES # BLD AUTO: 1.66 10*3/MM3 (ref 1–3)
LYMPHOCYTES NFR BLD AUTO: 28.4 % (ref 16–46)
Lab: NORMAL
MCH RBC QN AUTO: 22.6 PG (ref 27–33)
MCHC RBC AUTO-ENTMCNC: 29.7 G/DL (ref 33–37)
MCV RBC AUTO: 75.8 FL (ref 80–94)
MONOCYTES # BLD AUTO: 0.65 10*3/MM3 (ref 0.1–0.9)
MONOCYTES NFR BLD AUTO: 11.1 % (ref 0–12)
NEGATIVE CONTROL: NEGATIVE
NEUTROPHILS # BLD AUTO: 3.41 10*3/MM3 (ref 1.4–6.5)
NEUTROPHILS NFR BLD AUTO: 58.5 % (ref 40–75)
OSMOLALITY SERPL CALC.SUM OF ELEC: 271.6 MOSM/KG (ref 273–305)
PLATELET # BLD AUTO: 218 10*3/MM3 (ref 130–400)
PMV BLD AUTO: 9.6 FL (ref 6–10)
POSITIVE CONTROL: POSITIVE
POTASSIUM BLD-SCNC: 3.5 MMOL/L (ref 3.5–5.3)
PROT SERPL-MCNC: 6 G/DL (ref 6–8)
RBC # BLD AUTO: 3.68 10*6/MM3 (ref 4.7–6.1)
RH BLD: NEGATIVE
SODIUM BLD-SCNC: 137 MMOL/L (ref 135–153)
T&S EXPIRATION DATE: NORMAL
WBC NRBC COR # BLD: 5.84 10*3/MM3 (ref 4.5–12.5)

## 2018-08-14 PROCEDURE — 86850 RBC ANTIBODY SCREEN: CPT | Performed by: PHYSICIAN ASSISTANT

## 2018-08-14 PROCEDURE — 82270 OCCULT BLOOD FECES: CPT | Performed by: PHYSICIAN ASSISTANT

## 2018-08-14 PROCEDURE — 85025 COMPLETE CBC W/AUTO DIFF WBC: CPT | Performed by: PHYSICIAN ASSISTANT

## 2018-08-14 PROCEDURE — 99283 EMERGENCY DEPT VISIT LOW MDM: CPT

## 2018-08-14 PROCEDURE — 86901 BLOOD TYPING SEROLOGIC RH(D): CPT | Performed by: PHYSICIAN ASSISTANT

## 2018-08-14 PROCEDURE — 80053 COMPREHEN METABOLIC PANEL: CPT | Performed by: PHYSICIAN ASSISTANT

## 2018-08-14 PROCEDURE — 86900 BLOOD TYPING SEROLOGIC ABO: CPT | Performed by: PHYSICIAN ASSISTANT

## 2018-08-14 NOTE — ED PROVIDER NOTES
Subjective   83-year-old white male presents secondary to questionable blood in his stool.  He is on Eliquis secondary to A. fib along with blood clots.  He recently received a blood transfusion approximately 3 weeks ago.  He was taken off as Eliquis until last Thursday.  He was concerned because his stool was greenish today.  He denies any bright red blood.  He denies any black stool or tarry stool.  He denies any abdominal pain.  He states that other than some shortness of breath which she's had chronically he has had no new problems.  No vomiting.  Specifically no vomiting of blood.            Review of Systems   Constitutional: Negative.  Negative for fever.   HENT: Negative.    Respiratory: Negative.    Cardiovascular: Negative.  Negative for chest pain.   Gastrointestinal: Negative.  Negative for abdominal pain.   Endocrine: Negative.    Genitourinary: Negative.  Negative for dysuria.   Skin: Negative.    Neurological: Negative.    Psychiatric/Behavioral: Negative.    All other systems reviewed and are negative.      Past Medical History:   Diagnosis Date   • A-fib (CMS/HCC)    • Anemia    • Bladder cancer (CMS/HCC)    • Bleeding ulcer    • CHF (congestive heart failure) (CMS/HCC)    • CKD (chronic kidney disease), stage III    • COPD (chronic obstructive pulmonary disease) (CMS/HCC)    • Diastolic heart failure (CMS/HCC)    • Disease of thyroid gland    • DVT (deep venous thrombosis) (CMS/HCC)    • GI bleed    • Hypertension    • PE (pulmonary thromboembolism) (CMS/HCC)        No Known Allergies    Past Surgical History:   Procedure Laterality Date   • CARDIAC DEFIBRILLATOR PLACEMENT     • CATARACT EXTRACTION     • DEBRIDEMENT LEG Right    • PACEMAKER REPLACEMENT     • PROSTATE SURGERY     • VENA CAVA FILTER INSERTION         History reviewed. No pertinent family history.    Social History     Social History   • Marital status:      Social History Main Topics   • Smoking status: Never Smoker   •  Smokeless tobacco: Never Used   • Alcohol use No   • Drug use: No   • Sexual activity: No     Other Topics Concern   • Not on file           Objective   Physical Exam   Constitutional: He is oriented to person, place, and time. He appears well-developed and well-nourished. No distress.   HENT:   Head: Normocephalic and atraumatic.   Right Ear: External ear normal.   Left Ear: External ear normal.   Nose: Nose normal.   Eyes: Pupils are equal, round, and reactive to light. Conjunctivae and EOM are normal.   Neck: Normal range of motion. Neck supple. No JVD present. No tracheal deviation present.   Cardiovascular: Normal rate, regular rhythm and normal heart sounds.    No murmur heard.  Pulmonary/Chest: Effort normal and breath sounds normal. No respiratory distress. He has no wheezes.   Abdominal: Soft. Bowel sounds are normal. There is no tenderness.   Genitourinary: Rectal exam shows guaiac negative stool.   Genitourinary Comments: Patients stool was then green nonbloody.  Guaiac-negative   Musculoskeletal: Normal range of motion. He exhibits no edema or deformity.   Neurological: He is alert and oriented to person, place, and time. No cranial nerve deficit.   Skin: Skin is warm and dry. No rash noted. He is not diaphoretic. No erythema. No pallor.   Psychiatric: He has a normal mood and affect. His behavior is normal. Thought content normal.   Nursing note and vitals reviewed.      Procedures           ED Course                  MDM  Number of Diagnoses or Management Options  Anemia, unspecified type: established and improving     Amount and/or Complexity of Data Reviewed  Clinical lab tests: ordered  Decide to obtain previous medical records or to obtain history from someone other than the patient: yes  Discuss the patient with other providers: yes    Risk of Complications, Morbidity, and/or Mortality  Presenting problems: moderate          Final diagnoses:   Anemia, unspecified type            Carlos Love,  PA  08/14/18 1543

## 2019-01-05 NOTE — THERAPY TREATMENT NOTE
-Febrile with leukocytosis on admission  - AES was consulted as patient has 2 biliary stents which placed on 12/19  -  ERCP showed Two visibly occluded stents from the biliary tree. 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 Two biliary stents removed, one placed in the common bile duct. GI recommending repeat ERCP in 6 weeks and possible biliary drain placement. Pt and family decline drain at this time.   - Oncology spoke with pt on 1/2, again noted unclear whether patient would derive any benefit from chemotherapy, given her poor performance status and the aggressiveness of her disease  - Pt with unsuccessful source control secondary to malignant stricture. Will continue IV zosyn while inpatient, with 2 week course of Augmentin on discharge.      Inpatient Rehabilitation - Occupational Therapy Treatment Note     Oscar     Patient Name: Carlos Cee  : 1934  MRN: [id number]    Today's Date: 5/3/2018                 Admit Date: 2018      Visit Dx:  No diagnosis found.    Patient Active Problem List   Diagnosis   • Debility         Therapy Treatment          IRF Treatment Summary     Row Name 18 142             Evaluation/Treatment Time and Intent    Document Type therapy note (daily note)  -AB      Mode of Treatment occupational therapy  -AB      Patient/Family Observations Pt. agreeable to therapy, no complaints  -AB      Recorded by [AB] Tiffanie Bell OT      Row Name 18 1422             Cognition/Psychosocial- PT/OT    Affect/Mental Status (Cognitive) WFL  -AB      Follows Commands (Cognition) WFL  -AB      Recorded by [AB] Tiffanie Bell OT      Row Name 18 1422             Safety Issues, Functional Mobility    Comment, Safety Issues/Impairments (Mobility) fall precautions, <skin integrity, wound vac RLE  -AB      Recorded by [AB] Tiffanie Bell OT      Row Name 18 1422             Grooming Assessment/Treatment    Grooming [place] Level supervision;set up  -AB      Grooming Position supported sitting;sink side  -AB      Recorded by [AB] Tiffanie Bell OT      Row Name 18 1422             Upper Extremity Seated Therapeutic Exercise    Device, Seated Upper Extremity (Therapeutic Exercise) restorator/arm bike   [id number], min resistance  -AB      Exercise Type, Seated Upper Extremity (Therapeutic Exercise) AROM (active range of motion)   BUE CoordEx, G/FMC TherEx/Act, strengthening  -AB      Expected Outcomes, Seated Upper Extremity (Therapeutic Exercise) improve functional tolerance, self-care activity;improve performance, BADLs  -AB      Sets/Reps Detail, Seated Upper Extremity (Therapeutic Exercise) BUE Wrist Rolls X3  -AB      Recorded by [AB] Tiffanie Bell OT       Row Name 05/03/18 1422             Positioning and Restraints    Post Treatment Position wheelchair  -AB      In Bed supine;call light within reach;encouraged to call for assist;side rails up x2   in PM  -AB      In Wheelchair sitting;with PT;legs elevated   in AM  -AB      Recorded by [AB] Tiffanie Bell OT        User Key  (r) = Recorded By, (t) = Taken By, (c) = Cosigned By    Initials Name Effective Dates    AB Tiffanie Bell OT 04/03/18 -           Wound 04/23/18 1945 Right lateral calf surgical (Active)   Dressing Appearance intact;dry 5/3/2018  7:20 AM   Drainage Characteristics/Odor brown 5/2/2018  8:00 PM   Wound Output (mL) 300 5/3/2018  1:29 AM       NPWT (Negative Pressure Wound Therapy) 04/25/18 1600 Right Lateral Calf (Active)   Therapy Setting continuous therapy 5/3/2018  7:20 AM   Dressing foam, black 5/3/2018  7:20 AM   Pressure Setting 125 mmHg 5/3/2018  7:20 AM         OT Recommendation and Plan    Anticipated Equipment Needs At Discharge (OT Eval):  (TBD)  Planned Therapy Interventions (OT Eval): activity tolerance training, adaptive equipment training, BADL retraining, ROM/therapeutic exercise, strengthening exercise, transfer/mobility retraining    Plan of Care Review  Plan of Care Reviewed With: patient  Plan of Care Reviewed With: patient  IRF Plan of Care Review: progress ongoing, continue  Progress, Functional Goals: progress more gradual than expected  Outcome Summary: Pt. w/ good tolerance and rest breaks provided. Continue w/ current POC.           OT IRF GOALS     Row Name 05/02/18 1244 05/02/18 1242 04/25/18 1144       Transfer Goal 1 (OT-IRF)    Activity/Assistive Device (Transfer Goal 1, OT-IRF)  --  -- bed-to-chair/chair-to-bed;toilet  -AB    [place] Level (Transfer Goal 1, OT-IRF)  --  -- standby assist  -AB    Time Frame (Transfer Goal 1, OT-IRF)  --  -- long term goal (LTG);by discharge  -AB       Bathing Goal 1 (OT-IRF)    Activity/Device (Bathing  Goal 1, OT-IRF) bathing skills, all  -AB  -- bathing skills, all  -AB    [place] Level (Bathing Goal 1, OT-IRF) moderate assist (50-74% patient effort);minimum assist (75% or more patient effort)  -AB  -- moderate assist (50-74% patient effort);verbal cues required  -AB    Time Frame (Bathing Goal 1, OT-IRF) short term goal (STG);5 - 7 days  -AB  -- short term goal (STG);5 - 7 days  -AB    Progress/Outcomes (Bathing Goal 1, OT-IRF)  -- goal met  -AB  --       Bathing Goal 2 (OT-IRF)    Activity/Device (Bathing Goal 2, OT-IRF)  --  -- bathing skills, all  -AB    [place] Level (Bathing Goal 2, OT-IRF)  --  -- minimum assist (75% or more patient effort);verbal cues required  -AB    Time Frame (Bathing Goal 2, OT-IRF)  --  -- long term goal (LTG);by discharge  -AB       LB Dressing Goal 1 (OT-IRF)    Activity/Device (LB Dressing Goal 1, OT-IRF) lower body dressing  -AB  -- lower body dressing  -AB    [place] (LB Dressing Goal 1, OT-IRF) maximum assist (25-49% patient effort);moderate assist (50-74% patient effort)  -AB  -- maximum assist (25-49% patient effort)  -AB    Time Frame (LB Dressing Goal 1, OT-IRF) short term goal (STG);5 - 7 days  -AB  -- short term goal (STG);5 - 7 days  -AB    Progress/Outcomes (LB Dressing Goal 1, OT-IRF)  -- goal met  -AB  --       LB Dressing Goal 2 (OT-IRF)    Activity/Device (LB Dressing Goal 2, OT-IRF)  --  -- lower body dressing  -AB    [place] (LB Dressing Goal 2, OT-IRF)  --  -- moderate assist (50-74% patient effort)  -AB    Time Frame (LB Dressing Goal 2, OT-IRF)  --  -- long term goal (LTG);by discharge  -AB       Toileting Goal 1 (OT-IRF)    [place] Level (Toileting Goal 1, OT-IRF)  --  -- minimum assist (75% or more patient effort)  -AB    Time Frame (Toileting Goal 1, OT-IRF)  --  -- long term goal (LTG);by discharge  -AB      User Key  (r) = Recorded By, (t) = Taken By, (c) = Cosigned By    Initials Name Provider Type    AB Tiffanie Bell, OT  Occupational Therapist                 Time Calculation:           Time Calculation- OT     Row Name 05/03/18 1414 05/03/18 0800          Time Calculation- OT    OT Start Time 1345  -AB 0800  -AB     OT Stop Time 1400  -AB 0915  -AB     OT Time Calculation (min) 15 min  -AB 75 min  -AB     Total Timed Code Minutes- OT 15 minute(s)  -AB 75 minute(s)  -AB     OT Non-Billable Time (min)  -- 15 min  -AB       User Key  (r) = Recorded By, (t) = Taken By, (c) = Cosigned By    Initials Name Provider Type    AB Tiffanie Bell, OT Occupational Therapist             Therapy Charges for Today     Code Description Service Date Service Provider Modifiers Qty    19363266121 HC OT THERAPEUTIC ACT EA 15 MIN 5/2/2018 Tiffanie Bell OT GO 3    79619796251 HC OT SELF CARE/MGMT/TRAIN EA 15 MIN 5/2/2018 Tiffanie Bell OT GO 3    64757879649 HC OT THER SUPP EA 15 MIN 5/2/2018 Tiffanie Bell OT GO 1    31178816350 HC OT THERAPEUTIC ACT EA 15 MIN 5/3/2018 Tiffanie Bell, OT GO 5    30495080998 HC OT SELF CARE/MGMT/TRAIN EA 15 MIN 5/3/2018 Tiffanie Bell OT GO 1    95733402352 HC OT THER SUPP EA 15 MIN 5/3/2018 Tiffanie Bell OT GO 1                   Tiffanie Bell OT  5/3/2018

## 2019-06-27 ENCOUNTER — HOSPITAL ENCOUNTER (EMERGENCY)
Facility: HOSPITAL | Age: 84
Discharge: HOME | End: 2019-06-27
Attending: EMERGENCY MEDICINE | Admitting: FAMILY MEDICINE

## 2019-06-27 ENCOUNTER — APPOINTMENT (OUTPATIENT)
Dept: GENERAL RADIOLOGY | Facility: HOSPITAL | Age: 84
End: 2019-06-27

## 2019-06-27 ENCOUNTER — APPOINTMENT (OUTPATIENT)
Dept: ULTRASOUND IMAGING | Facility: HOSPITAL | Age: 84
End: 2019-06-27

## 2019-06-27 ENCOUNTER — APPOINTMENT (OUTPATIENT)
Dept: NUCLEAR MEDICINE | Facility: HOSPITAL | Age: 84
End: 2019-06-27

## 2019-06-27 DIAGNOSIS — J90 PLEURAL EFFUSION: Primary | ICD-10-CM

## 2019-06-27 DIAGNOSIS — J18.9 PNEUMONIA OF RIGHT LOWER LOBE DUE TO INFECTIOUS ORGANISM: ICD-10-CM

## 2019-06-27 LAB
A-A DO2: 11.2 MMHG (ref 0–300)
ALBUMIN SERPL-MCNC: 3.65 G/DL (ref 3.5–5.2)
ALBUMIN/GLOB SERPL: 0.9 G/DL
ALP SERPL-CCNC: 82 U/L (ref 39–117)
ALT SERPL W P-5'-P-CCNC: 5 U/L (ref 1–41)
ANION GAP SERPL CALCULATED.3IONS-SCNC: 11.4 MMOL/L (ref 5–15)
ANISOCYTOSIS BLD QL: NORMAL
APTT PPP: 29.1 SECONDS (ref 23.8–36.1)
ARTERIAL PATENCY WRIST A: POSITIVE
AST SERPL-CCNC: 14 U/L (ref 1–40)
ATMOSPHERIC PRESS: 731 MMHG
BACTERIA UR QL AUTO: NORMAL /HPF
BASE EXCESS BLDA CALC-SCNC: 4.7 MMOL/L
BASOPHILS # BLD AUTO: 0.01 10*3/MM3 (ref 0–0.2)
BASOPHILS NFR BLD AUTO: 0.2 % (ref 0–1.5)
BDY SITE: ABNORMAL
BILIRUB SERPL-MCNC: 0.4 MG/DL (ref 0.2–1.2)
BILIRUB UR QL STRIP: NEGATIVE
BODY TEMPERATURE: 98.6 C
BUN BLD-MCNC: 17 MG/DL (ref 8–23)
BUN/CREAT SERPL: 10.8 (ref 7–25)
CALCIUM SPEC-SCNC: 9.3 MG/DL (ref 8.6–10.5)
CHLORIDE SERPL-SCNC: 94 MMOL/L (ref 98–107)
CLARITY UR: ABNORMAL
CO2 SERPL-SCNC: 29.6 MMOL/L (ref 22–29)
COHGB MFR BLD: 2.1 % (ref 0–5)
COLOR UR: YELLOW
CREAT BLD-MCNC: 1.57 MG/DL (ref 0.76–1.27)
CRP SERPL-MCNC: 0.05 MG/DL (ref 0–0.5)
D DIMER PPP FEU-MCNC: 1.62 MCGFEU/ML (ref 0–0.5)
D-LACTATE SERPL-SCNC: 1.7 MMOL/L (ref 0.5–2)
DEPRECATED RDW RBC AUTO: 48.4 FL (ref 37–54)
EOSINOPHIL # BLD AUTO: 0 10*3/MM3 (ref 0–0.4)
EOSINOPHIL NFR BLD AUTO: 0 % (ref 0.3–6.2)
ERYTHROCYTE [DISTWIDTH] IN BLOOD BY AUTOMATED COUNT: 17.7 % (ref 12.3–15.4)
GFR SERPL CREATININE-BSD FRML MDRD: 42 ML/MIN/1.73
GLOBULIN UR ELPH-MCNC: 4.2 GM/DL
GLUCOSE BLD-MCNC: 117 MG/DL (ref 65–99)
GLUCOSE UR STRIP-MCNC: NEGATIVE MG/DL
HCO3 BLDA-SCNC: 30.7 MMOL/L (ref 22–26)
HCT VFR BLD AUTO: 34.4 % (ref 37.5–51)
HCT VFR BLD CALC: 31 % (ref 42–52)
HGB BLD-MCNC: 9.9 G/DL (ref 13–17.7)
HGB BLDA-MCNC: 10.7 G/DL (ref 12–16)
HGB UR QL STRIP.AUTO: NEGATIVE
HOLD SPECIMEN: NORMAL
HOLD SPECIMEN: NORMAL
HOROWITZ INDEX BLD+IHG-RTO: 21 %
HYALINE CASTS UR QL AUTO: NORMAL /LPF
HYPOCHROMIA BLD QL: NORMAL
IMM GRANULOCYTES # BLD AUTO: 0.01 10*3/MM3 (ref 0–0.05)
IMM GRANULOCYTES NFR BLD AUTO: 0.2 % (ref 0–0.5)
INR PPP: 1.24 (ref 0.9–1.1)
KETONES UR QL STRIP: NEGATIVE
LEUKOCYTE ESTERASE UR QL STRIP.AUTO: ABNORMAL
LIPASE SERPL-CCNC: 33 U/L (ref 13–60)
LYMPHOCYTES # BLD AUTO: 0.75 10*3/MM3 (ref 0.7–3.1)
LYMPHOCYTES NFR BLD AUTO: 14.6 % (ref 19.6–45.3)
MCH RBC QN AUTO: 22.7 PG (ref 26.6–33)
MCHC RBC AUTO-ENTMCNC: 28.8 G/DL (ref 31.5–35.7)
MCV RBC AUTO: 78.9 FL (ref 79–97)
METHGB BLD QL: 0.2 % (ref 0–3)
MICROCYTES BLD QL: NORMAL
MODALITY: ABNORMAL
MONOCYTES # BLD AUTO: 0.41 10*3/MM3 (ref 0.1–0.9)
MONOCYTES NFR BLD AUTO: 8 % (ref 5–12)
NEUTROPHILS # BLD AUTO: 3.96 10*3/MM3 (ref 1.7–7)
NEUTROPHILS NFR BLD AUTO: 77 % (ref 42.7–76)
NITRITE UR QL STRIP: NEGATIVE
NT-PROBNP SERPL-MCNC: 4684 PG/ML (ref 5–1800)
OVALOCYTES BLD QL SMEAR: NORMAL
OXYHGB MFR BLDV: 90.5 % (ref 85–100)
PCO2 BLDA: 52.2 MM HG (ref 35–45)
PH BLDA: 7.39 PH UNITS (ref 7.35–7.45)
PH UR STRIP.AUTO: 6 [PH] (ref 5–8)
PLATELET # BLD AUTO: 143 10*3/MM3 (ref 140–450)
PMV BLD AUTO: 9.4 FL (ref 6–12)
PO2 BLDA: 69.9 MM HG (ref 80–100)
POTASSIUM BLD-SCNC: 4.2 MMOL/L (ref 3.5–5.2)
PROT SERPL-MCNC: 7.8 G/DL (ref 6–8.5)
PROT UR QL STRIP: NEGATIVE
PROTHROMBIN TIME: 16.2 SECONDS (ref 11–15.4)
RBC # BLD AUTO: 4.36 10*6/MM3 (ref 4.14–5.8)
RBC # UR: NORMAL /HPF
REF LAB TEST METHOD: NORMAL
SAO2 % BLDCOA: 92.6 % (ref 90–100)
SMALL PLATELETS BLD QL SMEAR: ADEQUATE
SODIUM BLD-SCNC: 135 MMOL/L (ref 136–145)
SP GR UR STRIP: 1.01 (ref 1–1.03)
SQUAMOUS #/AREA URNS HPF: NORMAL /HPF
TROPONIN T SERPL-MCNC: 0.02 NG/ML (ref 0–0.03)
TROPONIN T SERPL-MCNC: <0.01 NG/ML (ref 0–0.03)
TSH SERPL DL<=0.05 MIU/L-ACNC: 1.72 MIU/ML (ref 0.27–4.2)
UROBILINOGEN UR QL STRIP: ABNORMAL
WBC NRBC COR # BLD: 5.14 10*3/MM3 (ref 3.4–10.8)
WBC UR QL AUTO: NORMAL /HPF
WHOLE BLOOD HOLD SPECIMEN: NORMAL
WHOLE BLOOD HOLD SPECIMEN: NORMAL

## 2019-06-27 PROCEDURE — 25010000002 CEFTRIAXONE: Performed by: PHYSICIAN ASSISTANT

## 2019-06-27 PROCEDURE — 93005 ELECTROCARDIOGRAM TRACING: CPT | Performed by: EMERGENCY MEDICINE

## 2019-06-27 PROCEDURE — 82375 ASSAY CARBOXYHB QUANT: CPT | Performed by: PHYSICIAN ASSISTANT

## 2019-06-27 PROCEDURE — 84484 ASSAY OF TROPONIN QUANT: CPT | Performed by: PHYSICIAN ASSISTANT

## 2019-06-27 PROCEDURE — 99285 EMERGENCY DEPT VISIT HI MDM: CPT

## 2019-06-27 PROCEDURE — 83050 HGB METHEMOGLOBIN QUAN: CPT | Performed by: PHYSICIAN ASSISTANT

## 2019-06-27 PROCEDURE — 71045 X-RAY EXAM CHEST 1 VIEW: CPT

## 2019-06-27 PROCEDURE — 85007 BL SMEAR W/DIFF WBC COUNT: CPT | Performed by: EMERGENCY MEDICINE

## 2019-06-27 PROCEDURE — 94760 N-INVAS EAR/PLS OXIMETRY 1: CPT

## 2019-06-27 PROCEDURE — 83605 ASSAY OF LACTIC ACID: CPT | Performed by: PHYSICIAN ASSISTANT

## 2019-06-27 PROCEDURE — 96367 TX/PROPH/DG ADDL SEQ IV INF: CPT

## 2019-06-27 PROCEDURE — 85379 FIBRIN DEGRADATION QUANT: CPT | Performed by: PHYSICIAN ASSISTANT

## 2019-06-27 PROCEDURE — 96375 TX/PRO/DX INJ NEW DRUG ADDON: CPT

## 2019-06-27 PROCEDURE — 83880 ASSAY OF NATRIURETIC PEPTIDE: CPT | Performed by: EMERGENCY MEDICINE

## 2019-06-27 PROCEDURE — 86140 C-REACTIVE PROTEIN: CPT | Performed by: PHYSICIAN ASSISTANT

## 2019-06-27 PROCEDURE — 0 TECHNETIUM ALBUMIN AGGREGATED: Performed by: EMERGENCY MEDICINE

## 2019-06-27 PROCEDURE — 94640 AIRWAY INHALATION TREATMENT: CPT

## 2019-06-27 PROCEDURE — 71045 X-RAY EXAM CHEST 1 VIEW: CPT | Performed by: RADIOLOGY

## 2019-06-27 PROCEDURE — 85610 PROTHROMBIN TIME: CPT | Performed by: PHYSICIAN ASSISTANT

## 2019-06-27 PROCEDURE — 93010 ELECTROCARDIOGRAM REPORT: CPT | Performed by: INTERNAL MEDICINE

## 2019-06-27 PROCEDURE — 78582 LUNG VENTILAT&PERFUS IMAGING: CPT

## 2019-06-27 PROCEDURE — 83690 ASSAY OF LIPASE: CPT | Performed by: PHYSICIAN ASSISTANT

## 2019-06-27 PROCEDURE — A9540 TC99M MAA: HCPCS | Performed by: EMERGENCY MEDICINE

## 2019-06-27 PROCEDURE — A9567 TECHNETIUM TC-99M AEROSOL: HCPCS | Performed by: EMERGENCY MEDICINE

## 2019-06-27 PROCEDURE — 0 TECHNETIUM TC 99M PENTETATE INHALER: Performed by: EMERGENCY MEDICINE

## 2019-06-27 PROCEDURE — 78582 LUNG VENTILAT&PERFUS IMAGING: CPT | Performed by: RADIOLOGY

## 2019-06-27 PROCEDURE — 25010000002 METHYLPREDNISOLONE PER 40 MG: Performed by: PHYSICIAN ASSISTANT

## 2019-06-27 PROCEDURE — 93971 EXTREMITY STUDY: CPT

## 2019-06-27 PROCEDURE — 85730 THROMBOPLASTIN TIME PARTIAL: CPT | Performed by: PHYSICIAN ASSISTANT

## 2019-06-27 PROCEDURE — 87040 BLOOD CULTURE FOR BACTERIA: CPT | Performed by: PHYSICIAN ASSISTANT

## 2019-06-27 PROCEDURE — 93971 EXTREMITY STUDY: CPT | Performed by: RADIOLOGY

## 2019-06-27 PROCEDURE — 96365 THER/PROPH/DIAG IV INF INIT: CPT

## 2019-06-27 PROCEDURE — 84443 ASSAY THYROID STIM HORMONE: CPT | Performed by: PHYSICIAN ASSISTANT

## 2019-06-27 PROCEDURE — 80053 COMPREHEN METABOLIC PANEL: CPT | Performed by: EMERGENCY MEDICINE

## 2019-06-27 PROCEDURE — 94799 UNLISTED PULMONARY SVC/PX: CPT

## 2019-06-27 PROCEDURE — 81001 URINALYSIS AUTO W/SCOPE: CPT | Performed by: PHYSICIAN ASSISTANT

## 2019-06-27 PROCEDURE — 85025 COMPLETE CBC W/AUTO DIFF WBC: CPT | Performed by: EMERGENCY MEDICINE

## 2019-06-27 PROCEDURE — 82805 BLOOD GASES W/O2 SATURATION: CPT | Performed by: PHYSICIAN ASSISTANT

## 2019-06-27 PROCEDURE — 36600 WITHDRAWAL OF ARTERIAL BLOOD: CPT | Performed by: PHYSICIAN ASSISTANT

## 2019-06-27 PROCEDURE — 84484 ASSAY OF TROPONIN QUANT: CPT | Performed by: EMERGENCY MEDICINE

## 2019-06-27 RX ORDER — AMOXICILLIN AND CLAVULANATE POTASSIUM 875; 125 MG/1; MG/1
1 TABLET, FILM COATED ORAL 2 TIMES DAILY
COMMUNITY
End: 2019-06-27

## 2019-06-27 RX ORDER — SODIUM CHLORIDE 0.9 % (FLUSH) 0.9 %
10 SYRINGE (ML) INJECTION AS NEEDED
Status: DISCONTINUED | OUTPATIENT
Start: 2019-06-27 | End: 2019-06-28 | Stop reason: HOSPADM

## 2019-06-27 RX ORDER — CEFDINIR 300 MG/1
300 CAPSULE ORAL 2 TIMES DAILY
Qty: 20 CAPSULE | Refills: 0 | Status: SHIPPED | OUTPATIENT
Start: 2019-06-27

## 2019-06-27 RX ORDER — DOXYCYCLINE 100 MG/1
100 CAPSULE ORAL 2 TIMES DAILY
Qty: 20 CAPSULE | Refills: 0 | Status: SHIPPED | OUTPATIENT
Start: 2019-06-27

## 2019-06-27 RX ORDER — METHYLPREDNISOLONE SODIUM SUCCINATE 40 MG/ML
80 INJECTION, POWDER, LYOPHILIZED, FOR SOLUTION INTRAMUSCULAR; INTRAVENOUS ONCE
Status: COMPLETED | OUTPATIENT
Start: 2019-06-27 | End: 2019-06-27

## 2019-06-27 RX ORDER — IPRATROPIUM BROMIDE AND ALBUTEROL SULFATE 2.5; .5 MG/3ML; MG/3ML
3 SOLUTION RESPIRATORY (INHALATION) ONCE
Status: COMPLETED | OUTPATIENT
Start: 2019-06-27 | End: 2019-06-27

## 2019-06-27 RX ADMIN — Medication 1 DOSE: at 17:21

## 2019-06-27 RX ADMIN — METHYLPREDNISOLONE SODIUM SUCCINATE 80 MG: 40 INJECTION, POWDER, FOR SOLUTION INTRAMUSCULAR; INTRAVENOUS at 15:26

## 2019-06-27 RX ADMIN — DOXYCYCLINE 100 MG: 100 INJECTION, POWDER, LYOPHILIZED, FOR SOLUTION INTRAVENOUS at 19:00

## 2019-06-27 RX ADMIN — Medication 1 DOSE: at 17:31

## 2019-06-27 RX ADMIN — IPRATROPIUM BROMIDE AND ALBUTEROL SULFATE 3 ML: .5; 3 SOLUTION RESPIRATORY (INHALATION) at 15:18

## 2019-06-27 RX ADMIN — CEFTRIAXONE 1 G: 1 INJECTION, POWDER, FOR SOLUTION INTRAMUSCULAR; INTRAVENOUS at 18:27

## 2019-06-27 RX ADMIN — SODIUM CHLORIDE 500 ML: 9 INJECTION, SOLUTION INTRAVENOUS at 15:25

## 2019-06-28 ENCOUNTER — EPISODE CHANGES (OUTPATIENT)
Dept: CASE MANAGEMENT | Facility: OTHER | Age: 84
End: 2019-06-28

## 2019-06-28 VITALS
RESPIRATION RATE: 19 BRPM | BODY MASS INDEX: 30.78 KG/M2 | SYSTOLIC BLOOD PRESSURE: 173 MMHG | WEIGHT: 215 LBS | TEMPERATURE: 98.6 F | HEART RATE: 66 BPM | HEIGHT: 70 IN | OXYGEN SATURATION: 99 % | DIASTOLIC BLOOD PRESSURE: 98 MMHG

## 2019-07-02 ENCOUNTER — TRANSCRIBE ORDERS (OUTPATIENT)
Dept: ADMINISTRATIVE | Facility: HOSPITAL | Age: 84
End: 2019-07-02

## 2019-07-02 ENCOUNTER — EPISODE CHANGES (OUTPATIENT)
Dept: CASE MANAGEMENT | Facility: OTHER | Age: 84
End: 2019-07-02

## 2019-07-02 DIAGNOSIS — I50.20 SYSTOLIC CONGESTIVE HEART FAILURE, UNSPECIFIED HF CHRONICITY (HCC): ICD-10-CM

## 2019-07-02 DIAGNOSIS — R06.02 SOB (SHORTNESS OF BREATH): Primary | ICD-10-CM

## 2019-07-02 LAB
BACTERIA SPEC AEROBE CULT: NORMAL
BACTERIA SPEC AEROBE CULT: NORMAL

## 2019-07-12 ENCOUNTER — HOSPITAL ENCOUNTER (OUTPATIENT)
Dept: CARDIOLOGY | Facility: HOSPITAL | Age: 84
Discharge: HOME | End: 2019-07-12
Admitting: NURSE PRACTITIONER

## 2019-07-12 DIAGNOSIS — R06.02 SOB (SHORTNESS OF BREATH): ICD-10-CM

## 2019-07-12 PROCEDURE — 93306 TTE W/DOPPLER COMPLETE: CPT | Performed by: INTERNAL MEDICINE

## 2019-07-12 PROCEDURE — 93306 TTE W/DOPPLER COMPLETE: CPT

## 2019-07-14 LAB
BH CV ECHO MEAS - % IVS THICK: 59.4 %
BH CV ECHO MEAS - % LVPW THICK: 41.4 %
BH CV ECHO MEAS - ACS: 1.4 CM
BH CV ECHO MEAS - AO MAX PG (FULL): 9.8 MMHG
BH CV ECHO MEAS - AO MAX PG: 16.4 MMHG
BH CV ECHO MEAS - AO MEAN PG (FULL): 6 MMHG
BH CV ECHO MEAS - AO MEAN PG: 9.6 MMHG
BH CV ECHO MEAS - AO ROOT AREA (BSA CORRECTED): 1.9
BH CV ECHO MEAS - AO ROOT AREA: 12.5 CM^2
BH CV ECHO MEAS - AO ROOT DIAM: 4 CM
BH CV ECHO MEAS - AO V2 MAX: 202.2 CM/SEC
BH CV ECHO MEAS - AO V2 MEAN: 146.9 CM/SEC
BH CV ECHO MEAS - AO V2 VTI: 45 CM
BH CV ECHO MEAS - AVA(I,A): 2.1 CM^2
BH CV ECHO MEAS - AVA(I,D): 2.1 CM^2
BH CV ECHO MEAS - AVA(V,A): 2.1 CM^2
BH CV ECHO MEAS - AVA(V,D): 2.1 CM^2
BH CV ECHO MEAS - BSA(HAYCOCK): 2.2 M^2
BH CV ECHO MEAS - BSA: 2.2 M^2
BH CV ECHO MEAS - BZI_BMI: 30.8 KILOGRAMS/M^2
BH CV ECHO MEAS - BZI_METRIC_HEIGHT: 177.8 CM
BH CV ECHO MEAS - BZI_METRIC_WEIGHT: 97.5 KG
BH CV ECHO MEAS - EDV(CUBED): 152.1 ML
BH CV ECHO MEAS - EDV(MOD-SP4): 63 ML
BH CV ECHO MEAS - EDV(TEICH): 137.6 ML
BH CV ECHO MEAS - EF(CUBED): 87 %
BH CV ECHO MEAS - EF(MOD-SP4): 71.4 %
BH CV ECHO MEAS - EF(TEICH): 80.3 %
BH CV ECHO MEAS - ESV(CUBED): 19.8 ML
BH CV ECHO MEAS - ESV(MOD-SP4): 18 ML
BH CV ECHO MEAS - ESV(TEICH): 27.1 ML
BH CV ECHO MEAS - FS: 49.4 %
BH CV ECHO MEAS - IVS/LVPW: 1.1
BH CV ECHO MEAS - IVSD: 1.6 CM
BH CV ECHO MEAS - IVSS: 2.6 CM
BH CV ECHO MEAS - LA DIMENSION: 4.5 CM
BH CV ECHO MEAS - LA/AO: 1.1
BH CV ECHO MEAS - LV DIASTOLIC VOL/BSA (35-75): 29.3 ML/M^2
BH CV ECHO MEAS - LV MASS(C)D: 364.9 GRAMS
BH CV ECHO MEAS - LV MASS(C)DI: 169.5 GRAMS/M^2
BH CV ECHO MEAS - LV MASS(C)S: 308.5 GRAMS
BH CV ECHO MEAS - LV MASS(C)SI: 143.3 GRAMS/M^2
BH CV ECHO MEAS - LV MAX PG: 6.6 MMHG
BH CV ECHO MEAS - LV MEAN PG: 3.6 MMHG
BH CV ECHO MEAS - LV SYSTOLIC VOL/BSA (12-30): 8.4 ML/M^2
BH CV ECHO MEAS - LV V1 MAX: 128.3 CM/SEC
BH CV ECHO MEAS - LV V1 MEAN: 88.1 CM/SEC
BH CV ECHO MEAS - LV V1 VTI: 28.5 CM
BH CV ECHO MEAS - LVIDD: 5.3 CM
BH CV ECHO MEAS - LVIDS: 2.7 CM
BH CV ECHO MEAS - LVLD AP4: 6.4 CM
BH CV ECHO MEAS - LVLS AP4: 6.9 CM
BH CV ECHO MEAS - LVOT AREA (M): 3.5 CM^2
BH CV ECHO MEAS - LVOT AREA: 3.3 CM^2
BH CV ECHO MEAS - LVOT DIAM: 2.1 CM
BH CV ECHO MEAS - LVPWD: 1.4 CM
BH CV ECHO MEAS - LVPWS: 2 CM
BH CV ECHO MEAS - PA ACC SLOPE: 1085 CM/SEC^2
BH CV ECHO MEAS - PA ACC TIME: 0.1 SEC
BH CV ECHO MEAS - PA PR(ACCEL): 36.2 MMHG
BH CV ECHO MEAS - RAP SYSTOLE: 10 MMHG
BH CV ECHO MEAS - RVDD: 3.9 CM
BH CV ECHO MEAS - RVSP: 54.2 MMHG
BH CV ECHO MEAS - SI(AO): 260.6 ML/M^2
BH CV ECHO MEAS - SI(CUBED): 61.5 ML/M^2
BH CV ECHO MEAS - SI(LVOT): 44.1 ML/M^2
BH CV ECHO MEAS - SI(MOD-SP4): 20.9 ML/M^2
BH CV ECHO MEAS - SI(TEICH): 51.3 ML/M^2
BH CV ECHO MEAS - SV(AO): 561 ML
BH CV ECHO MEAS - SV(CUBED): 132.4 ML
BH CV ECHO MEAS - SV(LVOT): 95 ML
BH CV ECHO MEAS - SV(MOD-SP4): 45 ML
BH CV ECHO MEAS - SV(TEICH): 110.5 ML
BH CV ECHO MEAS - TR MAX VEL: 332.6 CM/SEC
MAXIMAL PREDICTED HEART RATE: 136 BPM
STRESS TARGET HR: 116 BPM

## 2024-01-26 NOTE — DISCHARGE INSTR - APPOINTMENTS
Follow-up with Dr. Franklin on 8/8/18 @ 10:30 am  # [phone]  
pt received to [facility] and ambulatory at baseline, c/o elevated BP, found accidentally while being treated for unrelated work injury. pt endorsing intermittent headaches but otherwise neuro is in tact. denies chest pain, sob. no acute distress noted at this time. respirations even and nonlabored on room air. comfort and safety maintained. labs drawn and sent. manual /106